# Patient Record
Sex: FEMALE | Race: WHITE | NOT HISPANIC OR LATINO | Employment: FULL TIME | URBAN - METROPOLITAN AREA
[De-identification: names, ages, dates, MRNs, and addresses within clinical notes are randomized per-mention and may not be internally consistent; named-entity substitution may affect disease eponyms.]

---

## 2017-08-03 ENCOUNTER — DOCUMENTATION ONLY (OUTPATIENT)
Dept: PRIMARY CARE CLINIC | Facility: CLINIC | Age: 52
End: 2017-08-03

## 2017-08-03 RX ORDER — CETIRIZINE HYDROCHLORIDE 10 MG/1
10 TABLET ORAL DAILY
COMMUNITY

## 2017-08-03 RX ORDER — ATORVASTATIN CALCIUM 10 MG/1
10 TABLET, FILM COATED ORAL DAILY
COMMUNITY
End: 2017-08-07 | Stop reason: ALTCHOICE

## 2017-08-03 RX ORDER — MULTIVITAMIN
TABLET ORAL
COMMUNITY

## 2017-08-03 RX ORDER — ASPIRIN 81 MG/1
81 TABLET ORAL DAILY
COMMUNITY
End: 2023-05-22

## 2017-08-07 ENCOUNTER — OFFICE VISIT (OUTPATIENT)
Dept: PRIMARY CARE CLINIC | Facility: CLINIC | Age: 52
End: 2017-08-07
Payer: OTHER GOVERNMENT

## 2017-08-07 VITALS
HEART RATE: 53 BPM | WEIGHT: 239 LBS | OXYGEN SATURATION: 96 % | TEMPERATURE: 98 F | SYSTOLIC BLOOD PRESSURE: 109 MMHG | BODY MASS INDEX: 42.35 KG/M2 | RESPIRATION RATE: 18 BRPM | HEIGHT: 63 IN | DIASTOLIC BLOOD PRESSURE: 73 MMHG

## 2017-08-07 DIAGNOSIS — E78.5 HYPERLIPIDEMIA, UNSPECIFIED HYPERLIPIDEMIA TYPE: ICD-10-CM

## 2017-08-07 DIAGNOSIS — Z23 NEED FOR PROPHYLACTIC VACCINATION AGAINST STREPTOCOCCUS PNEUMONIAE (PNEUMOCOCCUS): ICD-10-CM

## 2017-08-07 DIAGNOSIS — Z12.31 ENCOUNTER FOR SCREENING MAMMOGRAM FOR BREAST CANCER: ICD-10-CM

## 2017-08-07 DIAGNOSIS — E78.5 TYPE 2 DIABETES MELLITUS WITH HYPERLIPIDEMIA: Primary | ICD-10-CM

## 2017-08-07 DIAGNOSIS — E11.69 TYPE 2 DIABETES MELLITUS WITH HYPERLIPIDEMIA: Primary | ICD-10-CM

## 2017-08-07 DIAGNOSIS — E55.9 VITAMIN D DEFICIENCY DISEASE: ICD-10-CM

## 2017-08-07 PROCEDURE — 90732 PPSV23 VACC 2 YRS+ SUBQ/IM: CPT | Mod: S$GLB,,, | Performed by: FAMILY MEDICINE

## 2017-08-07 PROCEDURE — 90471 IMMUNIZATION ADMIN: CPT | Mod: S$GLB,,, | Performed by: FAMILY MEDICINE

## 2017-08-07 PROCEDURE — 99214 OFFICE O/P EST MOD 30 MIN: CPT | Mod: 25,S$GLB,, | Performed by: FAMILY MEDICINE

## 2017-08-07 RX ORDER — ACETAMINOPHEN 500 MG
1 TABLET ORAL DAILY
COMMUNITY
End: 2017-08-07

## 2017-08-07 RX ORDER — GLUCOSAMINE/CHONDRO SU A 500-400 MG
1 TABLET ORAL DAILY
COMMUNITY
End: 2017-08-07

## 2017-08-07 RX ORDER — FUROSEMIDE 20 MG/1
20 TABLET ORAL DAILY
COMMUNITY
End: 2017-08-07

## 2017-08-07 RX ORDER — TRAMADOL HYDROCHLORIDE 50 MG/1
50 TABLET ORAL EVERY 6 HOURS PRN
COMMUNITY
End: 2017-08-07

## 2017-08-07 RX ORDER — PRAVASTATIN SODIUM 20 MG/1
20 TABLET ORAL DAILY
COMMUNITY
End: 2017-08-07 | Stop reason: ALTCHOICE

## 2017-08-07 RX ORDER — LISINOPRIL 20 MG/1
20 TABLET ORAL DAILY
COMMUNITY
End: 2017-08-07

## 2017-08-07 RX ORDER — OMEGA-3 FATTY ACIDS 1000 MG
1 CAPSULE ORAL DAILY
COMMUNITY
End: 2017-08-07

## 2017-08-07 RX ORDER — MELOXICAM 15 MG/1
15 TABLET ORAL DAILY
COMMUNITY
End: 2017-08-07

## 2017-08-07 RX ORDER — METFORMIN HYDROCHLORIDE 500 MG/1
2 TABLET, EXTENDED RELEASE ORAL DAILY
Refills: 1 | COMMUNITY
Start: 2017-05-31 | End: 2017-08-07 | Stop reason: SDUPTHER

## 2017-08-07 RX ORDER — POTASSIUM CHLORIDE 20 MEQ/15ML
SOLUTION ORAL DAILY
COMMUNITY
End: 2017-08-07

## 2017-08-07 RX ORDER — IPRATROPIUM BROMIDE 42 UG/1
2 SPRAY, METERED NASAL 3 TIMES DAILY
COMMUNITY
End: 2017-08-07

## 2017-08-07 RX ORDER — OMEPRAZOLE 20 MG/1
40 CAPSULE, DELAYED RELEASE ORAL DAILY
COMMUNITY
End: 2017-08-07

## 2017-08-07 RX ORDER — GABAPENTIN 300 MG/1
300 CAPSULE ORAL 3 TIMES DAILY
COMMUNITY
End: 2017-08-07

## 2017-08-07 RX ORDER — LEVOTHYROXINE SODIUM 125 UG/1
125 TABLET ORAL DAILY
COMMUNITY
End: 2017-08-07

## 2017-08-07 RX ORDER — BIOTIN 800 MCG
1 TABLET ORAL DAILY
COMMUNITY
End: 2017-08-07

## 2017-08-07 RX ORDER — ATORVASTATIN CALCIUM 10 MG/1
10 TABLET, FILM COATED ORAL DAILY
COMMUNITY
End: 2017-08-07 | Stop reason: SDUPTHER

## 2017-08-07 RX ORDER — DIAZEPAM 2 MG/1
2 TABLET ORAL DAILY PRN
COMMUNITY
End: 2017-08-07

## 2017-08-07 RX ORDER — TRIAMCINOLONE ACETONIDE 1 MG/ML
1 LOTION TOPICAL 2 TIMES DAILY
COMMUNITY
End: 2017-08-07

## 2017-08-07 RX ORDER — ATORVASTATIN CALCIUM 10 MG/1
10 TABLET, FILM COATED ORAL DAILY
Qty: 90 TABLET | Refills: 3 | Status: SHIPPED | OUTPATIENT
Start: 2017-08-07 | End: 2018-01-30 | Stop reason: SDUPTHER

## 2017-08-07 RX ORDER — METFORMIN HYDROCHLORIDE 500 MG/1
1000 TABLET, EXTENDED RELEASE ORAL DAILY
Qty: 180 TABLET | Refills: 3 | Status: SHIPPED | OUTPATIENT
Start: 2017-08-07 | End: 2018-01-30 | Stop reason: SDUPTHER

## 2017-08-07 NOTE — PROGRESS NOTES
Subjective:       Patient ID: Rosina Gary is a 52 y.o. female.    Chief Complaint: Follow-up (lab results)    Hx of vitaming D deficiency, recent levels WNL. Taking OTC supplement compliantly.      Diabetes   She presents for her follow-up diabetic visit. She has type 2 diabetes mellitus. Her disease course has been improving (A1C improved from 6.8 to 6.1). There are no hypoglycemic associated symptoms. Pertinent negatives for hypoglycemia include no dizziness. There are no diabetic associated symptoms. Pertinent negatives for diabetes include no chest pain, no polydipsia and no polyuria. There are no diabetic complications. Current diabetic treatment includes diet and oral agent (monotherapy). She is compliant with treatment all of the time. An ACE inhibitor/angiotensin II receptor blocker is not being taken.   Hyperlipidemia   This is a chronic problem. The current episode started more than 1 year ago. The problem is controlled. Recent lipid tests were reviewed and are normal. Exacerbating diseases include diabetes. Pertinent negatives include no chest pain, focal weakness or shortness of breath. Current antihyperlipidemic treatment includes statins. The current treatment provides significant improvement of lipids. There are no compliance problems.  Risk factors for coronary artery disease include diabetes mellitus and dyslipidemia.     Review of Systems   Constitutional: Negative for chills and fever.   Eyes: Negative for visual disturbance.   Respiratory: Negative for shortness of breath.    Cardiovascular: Negative for chest pain.   Endocrine: Negative for polydipsia and polyuria.   Genitourinary: Negative for difficulty urinating.   Neurological: Negative for dizziness and focal weakness.       Objective:      Physical Exam   Constitutional: She is oriented to person, place, and time. She appears well-developed and well-nourished.   HENT:   Head: Normocephalic and atraumatic.   Nose: Nose normal.    Mouth/Throat: Mucous membranes are normal.   Eyes: EOM are normal. Pupils are equal, round, and reactive to light.   Neck: Neck supple. No JVD present.   Cardiovascular: Normal rate, regular rhythm, normal heart sounds and normal pulses.    Pulmonary/Chest: Effort normal and breath sounds normal.   Abdominal: Soft. Normal appearance and bowel sounds are normal. She exhibits no distension. There is no tenderness.   Neurological: She is alert and oriented to person, place, and time.   Skin: Skin is warm and dry.   Psychiatric: She has a normal mood and affect. Her speech is normal.       Assessment:       1. Type 2 diabetes mellitus with hyperlipidemia Well controlled   2. Hyperlipidemia, unspecified hyperlipidemia type Stable   3. Vitamin D deficiency disease Stable   4. Need for prophylactic vaccination against Streptococcus pneumoniae (pneumococcus)    5. Encounter for screening mammogram for breast cancer        Plan:       Type 2 diabetes mellitus with hyperlipidemia  -     Hemoglobin A1c; Future; Expected date: 02/07/2018  -     Comprehensive metabolic panel; Future; Expected date: 02/07/2018  -     metformin (GLUCOPHAGE-XR) 500 MG 24 hr tablet; Take 2 tablets (1,000 mg total) by mouth once daily. With dinner  Dispense: 180 tablet; Refill: 3    Hyperlipidemia, unspecified hyperlipidemia type  -     Comprehensive metabolic panel; Future; Expected date: 02/07/2018  -     Lipid panel; Future; Expected date: 02/07/2018  -     atorvastatin (LIPITOR) 10 MG tablet; Take 1 tablet (10 mg total) by mouth once daily.  Dispense: 90 tablet; Refill: 3    Vitamin D deficiency disease    Need for prophylactic vaccination against Streptococcus pneumoniae (pneumococcus)  -     (In Office Administered) Pneumococcal Polysaccharide Vaccine (23 Valent) (SQ/IM)    Encounter for screening mammogram for breast cancer  -     Mammo Digital Screening Bilateral With CAD; Future; Expected date: 08/07/2017

## 2017-08-14 ENCOUNTER — TELEPHONE (OUTPATIENT)
Dept: PRIMARY CARE CLINIC | Facility: CLINIC | Age: 52
End: 2017-08-14

## 2017-08-14 DIAGNOSIS — Z12.31 ENCOUNTER FOR SCREENING MAMMOGRAM FOR BREAST CANCER: Primary | ICD-10-CM

## 2017-08-14 NOTE — TELEPHONE ENCOUNTER
----- Message from Nicole Calabrese sent at 8/14/2017 11:35 AM CDT -----  Patient would like to set up her mammogram.     Please call patient back at [phone]     Thank you

## 2017-08-15 NOTE — TELEPHONE ENCOUNTER
Pt notified orders were being sent to [facility] and they should be calling her to set up an appointment. Pt states understanding

## 2017-09-01 ENCOUNTER — PATIENT MESSAGE (OUTPATIENT)
Dept: PRIMARY CARE CLINIC | Facility: CLINIC | Age: 52
End: 2017-09-01

## 2017-10-12 ENCOUNTER — OFFICE VISIT (OUTPATIENT)
Dept: PRIMARY CARE CLINIC | Facility: CLINIC | Age: 52
End: 2017-10-12
Payer: OTHER GOVERNMENT

## 2017-10-12 VITALS
OXYGEN SATURATION: 97 % | HEIGHT: 63 IN | TEMPERATURE: 98 F | HEART RATE: 46 BPM | DIASTOLIC BLOOD PRESSURE: 76 MMHG | BODY MASS INDEX: 42.88 KG/M2 | RESPIRATION RATE: 18 BRPM | SYSTOLIC BLOOD PRESSURE: 109 MMHG | WEIGHT: 242 LBS

## 2017-10-12 DIAGNOSIS — S46.012A ROTATOR CUFF STRAIN, LEFT, INITIAL ENCOUNTER: Primary | ICD-10-CM

## 2017-10-12 DIAGNOSIS — M25.551 RIGHT HIP PAIN: ICD-10-CM

## 2017-10-12 DIAGNOSIS — Z23 NEED FOR VACCINATION: ICD-10-CM

## 2017-10-12 PROCEDURE — 90686 IIV4 VACC NO PRSV 0.5 ML IM: CPT | Mod: S$GLB,,, | Performed by: FAMILY MEDICINE

## 2017-10-12 PROCEDURE — 99999 PR PBB SHADOW E&M-EST. PATIENT-LVL IV: CPT | Mod: PBBFAC,,, | Performed by: FAMILY MEDICINE

## 2017-10-12 PROCEDURE — 99214 OFFICE O/P EST MOD 30 MIN: CPT | Mod: 25,S$GLB,, | Performed by: FAMILY MEDICINE

## 2017-10-12 PROCEDURE — 90471 IMMUNIZATION ADMIN: CPT | Mod: S$GLB,,, | Performed by: FAMILY MEDICINE

## 2017-10-12 RX ORDER — ETODOLAC 400 MG/1
400 TABLET, FILM COATED ORAL 2 TIMES DAILY PRN
Qty: 60 TABLET | Refills: 1 | Status: SHIPPED | OUTPATIENT
Start: 2017-10-12 | End: 2018-01-30

## 2017-10-12 NOTE — PROGRESS NOTES
"Subjective:       Patient ID: Rosina Gary is a 52 y.o. female.    Chief Complaint: Hip Pain (pt says it makes a popping sound and hurts ) and Shoulder Pain (pts left shoulder has been hurting for about a month )    Left upper arm and shoulder pain for ~30 days. No known injury, but hurts to lie on affected side. Hurts to raise arm above head.  Also has been having right hip pain and 'popping' for the past 2-3 months, mainly with prolonged ambulation. No pain at rest, and no known injury. Hasn't taken anything for it or done anything for it.      Review of Systems   Constitutional: Negative for fever.   Respiratory: Negative for shortness of breath.    Cardiovascular: Negative for chest pain.   Musculoskeletal: Positive for arthralgias. Negative for joint swelling, myalgias, neck pain and neck stiffness.   Skin: Negative for rash.   Neurological: Negative for weakness.       Objective:      Vitals:    10/12/17 0805   BP: 109/76   BP Location: Left arm   Patient Position: Sitting   BP Method: Large (Automatic)   Pulse: (!) 46   Resp: 18   Temp: 98.4 °F (36.9 °C)   TempSrc: Oral   SpO2: 97%   Weight: 109.8 kg (242 lb)   Height: 5' 3" (1.6 m)     Physical Exam   Constitutional: She is oriented to person, place, and time. She appears well-developed and well-nourished.   HENT:   Head: Normocephalic and atraumatic.   Cardiovascular: Normal rate, regular rhythm and normal heart sounds.    Pulmonary/Chest: Effort normal and breath sounds normal.   Musculoskeletal: She exhibits no edema.        Left shoulder: She exhibits decreased range of motion. She exhibits no tenderness, no bony tenderness, no effusion, no crepitus and no deformity.        Right hip: She exhibits normal range of motion, normal strength, no bony tenderness, no crepitus and no deformity.   Left shoulder pain with forward flexion and abduction, decreased internal rotation; right hip pain with internal rotation   Neurological: She is alert and oriented " to person, place, and time.   Skin: Skin is warm and dry.       Assessment:       1. Rotator cuff strain, left, initial encounter    2. Right hip pain    3. Need for vaccination        Plan:       Rotator cuff strain, left, initial encounter  Comments:  Apply ice TID. May need ortho referral +/- MRI if no response to conservative tx  Orders:  -     X-Ray Shoulder 2 or More Views Left; Future; Expected date: 10/12/2017  -     Ambulatory Referral to Physical/Occupational Therapy  -     etodolac (LODINE) 400 MG tablet; Take 1 tablet (400 mg total) by mouth 2 (two) times daily as needed.  Dispense: 60 tablet; Refill: 1    Right hip pain  Comments:  probable OA  Orders:  -     X-Ray Hip 2 View Right; Future; Expected date: 10/12/2017  -     Ambulatory Referral to Physical/Occupational Therapy    Need for vaccination  -     Influenza - Quadrivalent (3 years & older) (PF)      Medication List with Changes/Refills   New Medications    ETODOLAC (LODINE) 400 MG TABLET    Take 1 tablet (400 mg total) by mouth 2 (two) times daily as needed.   Current Medications    ALBUTEROL (ACCUNEB) 1.25 MG/3 ML NEBU    Inhale 1.25 mg into the lungs every 4 (four) hours as needed. 1 Solution for Nebulization Inhalation Every 4-6 hours    ASPIRIN (ECOTRIN) 81 MG EC TABLET    Take 81 mg by mouth once daily.    ATORVASTATIN (LIPITOR) 10 MG TABLET    Take 1 tablet (10 mg total) by mouth once daily.    CALCIUM-VITAMIN D (CALCIUM 600 + D,3,) 600 MG(1,500MG) -400 UNIT TAB    Take by mouth.    CETIRIZINE (ZYRTEC) 10 MG TABLET    Take 10 mg by mouth once daily.    FLUTICASONE (FLONASE) 50 MCG/ACTUATION NASAL SPRAY    2 sprays by Each Nare route daily as needed. 2 Spray, Suspension Nasal Every day    METFORMIN (GLUCOPHAGE-XR) 500 MG 24 HR TABLET    Take 2 tablets (1,000 mg total) by mouth once daily. With dinner

## 2017-10-23 ENCOUNTER — TELEPHONE (OUTPATIENT)
Dept: PRIMARY CARE CLINIC | Facility: CLINIC | Age: 52
End: 2017-10-23

## 2017-10-23 NOTE — TELEPHONE ENCOUNTER
Patient notified, but still wants to do Pt.  She states no one has called her.  [phone]

## 2018-01-15 ENCOUNTER — CLINICAL SUPPORT (OUTPATIENT)
Dept: PRIMARY CARE CLINIC | Facility: CLINIC | Age: 53
End: 2018-01-15
Payer: OTHER GOVERNMENT

## 2018-01-15 DIAGNOSIS — E78.5 HYPERLIPIDEMIA, UNSPECIFIED HYPERLIPIDEMIA TYPE: ICD-10-CM

## 2018-01-15 DIAGNOSIS — E11.69 TYPE 2 DIABETES MELLITUS WITH HYPERLIPIDEMIA: ICD-10-CM

## 2018-01-15 DIAGNOSIS — E78.5 TYPE 2 DIABETES MELLITUS WITH HYPERLIPIDEMIA: ICD-10-CM

## 2018-01-15 PROCEDURE — 83036 HEMOGLOBIN GLYCOSYLATED A1C: CPT

## 2018-01-15 PROCEDURE — 80053 COMPREHEN METABOLIC PANEL: CPT

## 2018-01-15 PROCEDURE — 80061 LIPID PANEL: CPT

## 2018-01-15 PROCEDURE — 99999 PR PBB SHADOW E&M-EST. PATIENT-LVL II: CPT | Mod: PBBFAC,,,

## 2018-01-16 LAB
ALBUMIN SERPL BCP-MCNC: 3.3 G/DL
ALP SERPL-CCNC: 113 U/L
ALT SERPL W/O P-5'-P-CCNC: 18 U/L
ANION GAP SERPL CALC-SCNC: 10 MMOL/L
AST SERPL-CCNC: 20 U/L
BILIRUB SERPL-MCNC: 0.5 MG/DL
BUN SERPL-MCNC: 14 MG/DL
CALCIUM SERPL-MCNC: 9.1 MG/DL
CHLORIDE SERPL-SCNC: 108 MMOL/L
CHOLEST SERPL-MCNC: 117 MG/DL
CHOLEST/HDLC SERPL: 2.3 {RATIO}
CO2 SERPL-SCNC: 26 MMOL/L
CREAT SERPL-MCNC: 0.7 MG/DL
EST. GFR  (AFRICAN AMERICAN): >60 ML/MIN/1.73 M^2
EST. GFR  (NON AFRICAN AMERICAN): >60 ML/MIN/1.73 M^2
ESTIMATED AVG GLUCOSE: 123 MG/DL
GLUCOSE SERPL-MCNC: 99 MG/DL
HBA1C MFR BLD HPLC: 5.9 %
HDLC SERPL-MCNC: 51 MG/DL
HDLC SERPL: 43.6 %
LDLC SERPL CALC-MCNC: 48.6 MG/DL
NONHDLC SERPL-MCNC: 66 MG/DL
POTASSIUM SERPL-SCNC: 4.2 MMOL/L
PROT SERPL-MCNC: 7.1 G/DL
SODIUM SERPL-SCNC: 144 MMOL/L
TRIGL SERPL-MCNC: 87 MG/DL

## 2018-01-30 ENCOUNTER — OFFICE VISIT (OUTPATIENT)
Dept: PRIMARY CARE CLINIC | Facility: CLINIC | Age: 53
End: 2018-01-30
Payer: OTHER GOVERNMENT

## 2018-01-30 VITALS
TEMPERATURE: 98 F | DIASTOLIC BLOOD PRESSURE: 83 MMHG | RESPIRATION RATE: 18 BRPM | SYSTOLIC BLOOD PRESSURE: 141 MMHG | BODY MASS INDEX: 42.17 KG/M2 | HEART RATE: 53 BPM | HEIGHT: 63 IN | WEIGHT: 238 LBS | OXYGEN SATURATION: 99 %

## 2018-01-30 DIAGNOSIS — E78.5 TYPE 2 DIABETES MELLITUS WITH HYPERLIPIDEMIA: Primary | ICD-10-CM

## 2018-01-30 DIAGNOSIS — E78.5 HYPERLIPIDEMIA, UNSPECIFIED HYPERLIPIDEMIA TYPE: ICD-10-CM

## 2018-01-30 DIAGNOSIS — J45.909 CHRONIC ASTHMA, UNSPECIFIED ASTHMA SEVERITY, UNSPECIFIED WHETHER COMPLICATED, UNSPECIFIED WHETHER PERSISTENT: Chronic | ICD-10-CM

## 2018-01-30 DIAGNOSIS — E11.69 TYPE 2 DIABETES MELLITUS WITH HYPERLIPIDEMIA: Primary | ICD-10-CM

## 2018-01-30 PROCEDURE — 99214 OFFICE O/P EST MOD 30 MIN: CPT | Mod: S$GLB,,, | Performed by: FAMILY MEDICINE

## 2018-01-30 PROCEDURE — 3008F BODY MASS INDEX DOCD: CPT | Mod: S$GLB,,, | Performed by: FAMILY MEDICINE

## 2018-01-30 PROCEDURE — 99999 PR PBB SHADOW E&M-EST. PATIENT-LVL III: CPT | Mod: PBBFAC,,, | Performed by: FAMILY MEDICINE

## 2018-01-30 RX ORDER — ALBUTEROL SULFATE 0.83 MG/ML
2.5 SOLUTION RESPIRATORY (INHALATION) EVERY 4 HOURS PRN
Qty: 1 BOX | Refills: 5 | Status: SHIPPED | OUTPATIENT
Start: 2018-01-30 | End: 2020-02-03 | Stop reason: SDUPTHER

## 2018-01-30 RX ORDER — ATORVASTATIN CALCIUM 10 MG/1
10 TABLET, FILM COATED ORAL DAILY
Qty: 90 TABLET | Refills: 3 | Status: SHIPPED | OUTPATIENT
Start: 2018-01-30 | End: 2019-01-19 | Stop reason: SDUPTHER

## 2018-01-30 RX ORDER — METFORMIN HYDROCHLORIDE 500 MG/1
1000 TABLET, EXTENDED RELEASE ORAL DAILY
Qty: 180 TABLET | Refills: 3 | Status: SHIPPED | OUTPATIENT
Start: 2018-01-30 | End: 2019-01-13 | Stop reason: SDUPTHER

## 2018-01-30 RX ORDER — ALBUTEROL SULFATE 1.25 MG/3ML
1.25 SOLUTION RESPIRATORY (INHALATION) EVERY 4 HOURS PRN
Qty: 1 BOX | Refills: 2 | Status: CANCELLED | OUTPATIENT
Start: 2018-01-30

## 2018-01-30 RX ORDER — ALBUTEROL SULFATE 90 UG/1
2 AEROSOL, METERED RESPIRATORY (INHALATION) EVERY 6 HOURS PRN
COMMUNITY
End: 2018-01-30 | Stop reason: SDUPTHER

## 2018-01-30 RX ORDER — ALBUTEROL SULFATE 90 UG/1
2 AEROSOL, METERED RESPIRATORY (INHALATION) EVERY 4 HOURS PRN
Qty: 18 G | Refills: 5 | Status: SHIPPED | OUTPATIENT
Start: 2018-01-30 | End: 2021-09-08 | Stop reason: SDUPTHER

## 2018-01-30 NOTE — PROGRESS NOTES
"Subjective:       Patient ID: Rosina Gary is a 53 y.o. female.    Chief Complaint: Results (pt is here to discuss ) and Medication Refill    Recent labs reviewed. All look good  Asthma - stable, only has symptoms in winter months, needs refills  DM - excellent glycemic control, compliant with meds and diet  HLD - lipid profile looks great, no SE from meds      Review of Systems   Constitutional: Negative for chills, fatigue, fever and weight loss.   HENT: Negative for congestion.    Eyes: Negative for blurred vision and visual disturbance.   Respiratory: Negative for cough and shortness of breath.    Cardiovascular: Negative for chest pain.   Gastrointestinal: Negative for abdominal pain, nausea and vomiting.   Endocrine: Negative for polydipsia, polyphagia and polyuria.   Genitourinary: Negative for difficulty urinating and impotence.   Musculoskeletal: Negative for arthralgias.   Skin: Negative for pallor and rash.   Neurological: Negative for dizziness, tremors, seizures, speech difficulty, weakness and headaches.   Psychiatric/Behavioral: Negative for confusion and sleep disturbance. The patient is not nervous/anxious.        Objective:      Vitals:    01/30/18 1413   BP: (!) 141/83   BP Location: Left arm   Patient Position: Sitting   BP Method: Large (Automatic)   Pulse: (!) 53   Resp: 18   Temp: 97.7 °F (36.5 °C)   TempSrc: Oral   SpO2: 99%   Weight: 108 kg (238 lb)   Height: 5' 3" (1.6 m)     Lab Results   Component Value Date    WBC 6.39 08/17/2012    HGB 14.5 08/17/2012    HCT 46.1 08/17/2012     08/17/2012    CHOL 117 (L) 01/15/2018    TRIG 87 01/15/2018    HDL 51 01/15/2018    ALT 18 01/15/2018    AST 20 01/15/2018     01/15/2018    K 4.2 01/15/2018     01/15/2018    CREATININE 0.7 01/15/2018    BUN 14 01/15/2018    CO2 26 01/15/2018    TSH 2.042 08/17/2012    HGBA1C 5.9 (H) 01/15/2018     Physical Exam   Constitutional: She is oriented to person, place, and time. She appears " well-developed and well-nourished.   HENT:   Head: Normocephalic and atraumatic.   Eyes: EOM are normal. Pupils are equal, round, and reactive to light.   Neck: Neck supple. No JVD present. Carotid bruit is not present.   Cardiovascular: Normal rate, regular rhythm and normal heart sounds.    Pulses:       Radial pulses are 2+ on the right side, and 2+ on the left side.   Pulmonary/Chest: Effort normal and breath sounds normal.   Abdominal: Soft. Bowel sounds are normal. She exhibits no distension. There is no tenderness.   Musculoskeletal: She exhibits no edema.   Neurological: She is alert and oriented to person, place, and time.   Skin: Skin is warm and dry.   Psychiatric: She has a normal mood and affect. Her behavior is normal.   Nursing note and vitals reviewed.      Assessment:       1. Type 2 diabetes mellitus with hyperlipidemia Well controlled   2. Hyperlipidemia, unspecified hyperlipidemia type Stable   3. Chronic asthma, unspecified asthma severity, unspecified whether complicated, unspecified whether persistent Stable       Plan:       Type 2 diabetes mellitus with hyperlipidemia  -     metFORMIN (GLUCOPHAGE-XR) 500 MG 24 hr tablet; Take 2 tablets (1,000 mg total) by mouth once daily. With dinner  Dispense: 180 tablet; Refill: 3  -     Comprehensive metabolic panel; Future; Expected date: 07/30/2018  -     Hemoglobin A1c; Future; Expected date: 07/30/2018  -     Microalbumin/creatinine urine ratio; Future; Expected date: 07/30/2018    Hyperlipidemia, unspecified hyperlipidemia type  -     atorvastatin (LIPITOR) 10 MG tablet; Take 1 tablet (10 mg total) by mouth once daily.  Dispense: 90 tablet; Refill: 3  -     Comprehensive metabolic panel; Future; Expected date: 07/30/2018  -     Lipid panel; Future; Expected date: 07/30/2018    Chronic asthma, unspecified asthma severity, unspecified whether complicated, unspecified whether persistent  -     albuterol (PROAIR HFA) 90 mcg/actuation inhaler; Inhale 2  puffs into the lungs every 4 (four) hours as needed for Wheezing or Shortness of Breath. Rescue  Dispense: 18 g; Refill: 5  -     albuterol (PROVENTIL) 2.5 mg /3 mL (0.083 %) nebulizer solution; Take 3 mLs (2.5 mg total) by nebulization every 4 (four) hours as needed for Wheezing or Shortness of Breath. Rescue  Dispense: 1 Box; Refill: 5  -     CBC auto differential; Future; Expected date: 07/30/2018    Other orders  -     Cancel: albuterol (ACCUNEB) 1.25 mg/3 mL Nebu; Take 3 mLs (1.25 mg total) by nebulization every 4 (four) hours as needed. 1 Solution for Nebulization Inhalation Every 4-6 hours  Dispense: 1 Box; Refill: 2      Medication List with Changes/Refills   New Medications    ALBUTEROL (PROVENTIL) 2.5 MG /3 ML (0.083 %) NEBULIZER SOLUTION    Take 3 mLs (2.5 mg total) by nebulization every 4 (four) hours as needed for Wheezing or Shortness of Breath. Rescue   Current Medications    ASPIRIN (ECOTRIN) 81 MG EC TABLET    Take 81 mg by mouth once daily.    CALCIUM-VITAMIN D (CALCIUM 600 + D,3,) 600 MG(1,500MG) -400 UNIT TAB    Take by mouth.    CETIRIZINE (ZYRTEC) 10 MG TABLET    Take 10 mg by mouth once daily.    FLUTICASONE (FLONASE) 50 MCG/ACTUATION NASAL SPRAY    2 sprays by Each Nare route daily as needed. 2 Spray, Suspension Nasal Every day   Changed and/or Refilled Medications    Modified Medication Previous Medication    ALBUTEROL (PROAIR HFA) 90 MCG/ACTUATION INHALER albuterol (PROAIR HFA) 90 mcg/actuation inhaler       Inhale 2 puffs into the lungs every 4 (four) hours as needed for Wheezing or Shortness of Breath. Rescue    Inhale 2 puffs into the lungs every 6 (six) hours as needed for Wheezing. Rescue    ATORVASTATIN (LIPITOR) 10 MG TABLET atorvastatin (LIPITOR) 10 MG tablet       Take 1 tablet (10 mg total) by mouth once daily.    Take 1 tablet (10 mg total) by mouth once daily.    METFORMIN (GLUCOPHAGE-XR) 500 MG 24 HR TABLET metformin (GLUCOPHAGE-XR) 500 MG 24 hr tablet       Take 2 tablets  (1,000 mg total) by mouth once daily. With dinner    Take 2 tablets (1,000 mg total) by mouth once daily. With dinner   Discontinued Medications    ALBUTEROL (ACCUNEB) 1.25 MG/3 ML NEBU    Inhale 1.25 mg into the lungs every 4 (four) hours as needed. 1 Solution for Nebulization Inhalation Every 4-6 hours    ETODOLAC (LODINE) 400 MG TABLET    Take 1 tablet (400 mg total) by mouth 2 (two) times daily as needed.

## 2018-07-30 ENCOUNTER — CLINICAL SUPPORT (OUTPATIENT)
Dept: PRIMARY CARE CLINIC | Facility: CLINIC | Age: 53
End: 2018-07-30
Payer: OTHER GOVERNMENT

## 2018-07-30 DIAGNOSIS — E78.5 HYPERLIPIDEMIA, UNSPECIFIED HYPERLIPIDEMIA TYPE: ICD-10-CM

## 2018-07-30 DIAGNOSIS — E11.69 TYPE 2 DIABETES MELLITUS WITH HYPERLIPIDEMIA: ICD-10-CM

## 2018-07-30 DIAGNOSIS — J45.909 CHRONIC ASTHMA, UNSPECIFIED ASTHMA SEVERITY, UNSPECIFIED WHETHER COMPLICATED, UNSPECIFIED WHETHER PERSISTENT: Chronic | ICD-10-CM

## 2018-07-30 DIAGNOSIS — E78.5 TYPE 2 DIABETES MELLITUS WITH HYPERLIPIDEMIA: ICD-10-CM

## 2018-07-30 LAB
ALBUMIN SERPL BCP-MCNC: 3.8 G/DL
ALBUMIN/CREAT UR: 10.3 UG/MG
ALP SERPL-CCNC: 79 U/L
ALT SERPL W/O P-5'-P-CCNC: 22 U/L
ANION GAP SERPL CALC-SCNC: 8 MMOL/L
AST SERPL-CCNC: 28 U/L
BASOPHILS # BLD AUTO: 0.1 K/UL
BASOPHILS NFR BLD: 0.9 %
BILIRUB SERPL-MCNC: 0.6 MG/DL
BUN SERPL-MCNC: 18 MG/DL
CALCIUM SERPL-MCNC: 9.1 MG/DL
CHLORIDE SERPL-SCNC: 109 MMOL/L
CHOLEST SERPL-MCNC: 122 MG/DL
CHOLEST/HDLC SERPL: 2.1 {RATIO}
CO2 SERPL-SCNC: 25 MMOL/L
CREAT SERPL-MCNC: 0.8 MG/DL
CREAT UR-MCNC: 174 MG/DL
DIFFERENTIAL METHOD: ABNORMAL
EOSINOPHIL # BLD AUTO: 0.2 K/UL
EOSINOPHIL NFR BLD: 2.1 %
ERYTHROCYTE [DISTWIDTH] IN BLOOD BY AUTOMATED COUNT: 14.4 %
EST. GFR  (AFRICAN AMERICAN): >60 ML/MIN/1.73 M^2
EST. GFR  (NON AFRICAN AMERICAN): >60 ML/MIN/1.73 M^2
ESTIMATED AVG GLUCOSE: 131 MG/DL
GLUCOSE SERPL-MCNC: 98 MG/DL
HBA1C MFR BLD HPLC: 6.2 %
HCT VFR BLD AUTO: 41.4 %
HDLC SERPL-MCNC: 57 MG/DL
HDLC SERPL: 46.7 %
HGB BLD-MCNC: 13.5 G/DL
LDLC SERPL CALC-MCNC: 45 MG/DL
LYMPHOCYTES # BLD AUTO: 2 K/UL
LYMPHOCYTES NFR BLD: 27.7 %
MCH RBC QN AUTO: 28.9 PG
MCHC RBC AUTO-ENTMCNC: 32.6 G/DL
MCV RBC AUTO: 89 FL
MICROALBUMIN UR DL<=1MG/L-MCNC: 18 UG/ML
MONOCYTES # BLD AUTO: 0.6 K/UL
MONOCYTES NFR BLD: 7.9 %
NEUTROPHILS # BLD AUTO: 4.4 K/UL
NEUTROPHILS NFR BLD: 61.4 %
NONHDLC SERPL-MCNC: 65 MG/DL
PLATELET # BLD AUTO: 259 K/UL
PMV BLD AUTO: 8.6 FL
POTASSIUM SERPL-SCNC: 3.9 MMOL/L
PROT SERPL-MCNC: 7.1 G/DL
RBC # BLD AUTO: 4.67 M/UL
SODIUM SERPL-SCNC: 142 MMOL/L
TRIGL SERPL-MCNC: 99 MG/DL
WBC # BLD AUTO: 7.1 K/UL

## 2018-07-30 PROCEDURE — 99999 PR PBB SHADOW E&M-EST. PATIENT-LVL I: CPT | Mod: PBBFAC,,,

## 2018-07-30 PROCEDURE — 82043 UR ALBUMIN QUANTITATIVE: CPT

## 2018-07-30 PROCEDURE — 83036 HEMOGLOBIN GLYCOSYLATED A1C: CPT

## 2018-08-06 ENCOUNTER — OFFICE VISIT (OUTPATIENT)
Dept: PRIMARY CARE CLINIC | Facility: CLINIC | Age: 53
End: 2018-08-06
Payer: OTHER GOVERNMENT

## 2018-08-06 VITALS
RESPIRATION RATE: 18 BRPM | HEART RATE: 59 BPM | OXYGEN SATURATION: 96 % | BODY MASS INDEX: 41.82 KG/M2 | WEIGHT: 236 LBS | DIASTOLIC BLOOD PRESSURE: 86 MMHG | HEIGHT: 63 IN | SYSTOLIC BLOOD PRESSURE: 136 MMHG | TEMPERATURE: 98 F

## 2018-08-06 DIAGNOSIS — E55.9 VITAMIN D DEFICIENCY DISEASE: ICD-10-CM

## 2018-08-06 DIAGNOSIS — E66.01 MORBID OBESITY WITH BMI OF 40.0-44.9, ADULT: ICD-10-CM

## 2018-08-06 DIAGNOSIS — Z12.4 CERVICAL CANCER SCREENING: ICD-10-CM

## 2018-08-06 DIAGNOSIS — E11.69 TYPE 2 DIABETES MELLITUS WITH HYPERLIPIDEMIA: Primary | ICD-10-CM

## 2018-08-06 DIAGNOSIS — Z13.5 DIABETIC RETINOPATHY SCREENING: ICD-10-CM

## 2018-08-06 DIAGNOSIS — E78.5 TYPE 2 DIABETES MELLITUS WITH HYPERLIPIDEMIA: Primary | ICD-10-CM

## 2018-08-06 DIAGNOSIS — J45.20 MILD INTERMITTENT CHRONIC ASTHMA WITHOUT COMPLICATION: Chronic | ICD-10-CM

## 2018-08-06 DIAGNOSIS — E78.5 HYPERLIPIDEMIA, UNSPECIFIED HYPERLIPIDEMIA TYPE: ICD-10-CM

## 2018-08-06 DIAGNOSIS — Z12.31 ENCOUNTER FOR SCREENING MAMMOGRAM FOR BREAST CANCER: ICD-10-CM

## 2018-08-06 PROCEDURE — 99214 OFFICE O/P EST MOD 30 MIN: CPT | Mod: S$GLB,,, | Performed by: FAMILY MEDICINE

## 2018-08-06 PROCEDURE — 99999 PR PBB SHADOW E&M-EST. PATIENT-LVL IV: CPT | Mod: PBBFAC,,, | Performed by: FAMILY MEDICINE

## 2018-08-06 NOTE — PROGRESS NOTES
Subjective:       Patient ID: Rosina Gary is a 53 y.o. female.    Chief Complaint: Diabetes (patient is here to discuss lab results )    Diabetes   She presents for her follow-up diabetic visit. She has type 2 diabetes mellitus. Her disease course has been stable. There are no hypoglycemic associated symptoms. Pertinent negatives for hypoglycemia include no confusion. There are no diabetic associated symptoms. Pertinent negatives for diabetes include no blurred vision, no chest pain, no foot ulcerations, no polydipsia, no polyuria and no visual change. There are no hypoglycemic complications. Symptoms are stable. There are no diabetic complications. Risk factors for coronary artery disease include diabetes mellitus, dyslipidemia and obesity. Current diabetic treatment includes oral agent (monotherapy). She is compliant with treatment all of the time. Her weight is stable. She is following a diabetic diet. An ACE inhibitor/angiotensin II receptor blocker is not being taken. Eye exam is current (saw Dr. Schoenberger 3 months ago).   Hyperlipidemia   This is a chronic problem. The current episode started more than 1 year ago. The problem is controlled. Recent lipid tests were reviewed and are normal. Exacerbating diseases include diabetes. There are no known factors aggravating her hyperlipidemia. Pertinent negatives include no chest pain or shortness of breath. Current antihyperlipidemic treatment includes statins. The current treatment provides significant improvement of lipids. There are no compliance problems.    Asthma   There is no chest tightness, hemoptysis, shortness of breath, sputum production or wheezing. This is a chronic problem. The current episode started more than 1 year ago. Asthma causes daytime symptoms never. Asthma causes nighttime symptoms never. The problem has been unchanged. Pertinent negatives include no chest pain, fever, heartburn, orthopnea, PND or trouble swallowing. Her symptoms are  "aggravated by change in weather (typically only has symptoms in winter). Her symptoms are alleviated by beta-agonist. She reports significant improvement on treatment. Her past medical history is significant for asthma.     Review of Systems   Constitutional: Negative for fever.   HENT: Negative for trouble swallowing.    Eyes: Negative for blurred vision and visual disturbance.   Respiratory: Negative for hemoptysis, sputum production, shortness of breath and wheezing.    Cardiovascular: Negative for chest pain and PND.   Gastrointestinal: Negative for heartburn, nausea and vomiting.   Endocrine: Negative for polydipsia and polyuria.   Musculoskeletal: Negative for joint swelling.   Skin: Negative for rash.   Psychiatric/Behavioral: Negative for agitation and confusion.       Objective:      Vitals:    08/06/18 0808   BP: 136/86   BP Location: Left arm   Patient Position: Sitting   BP Method: Large (Automatic)   Pulse: (!) 59   Resp: 18   Temp: 98.4 °F (36.9 °C)   TempSrc: Oral   SpO2: 96%   Weight: 107 kg (236 lb)   Height: 5' 3" (1.6 m)     Lab Results   Component Value Date    WBC 7.10 07/30/2018    HGB 13.5 07/30/2018    HCT 41.4 07/30/2018     07/30/2018    CHOL 122 07/30/2018    TRIG 99 07/30/2018    HDL 57 07/30/2018    ALT 22 07/30/2018    AST 28 07/30/2018     07/30/2018    K 3.9 07/30/2018     07/30/2018    CREATININE 0.8 07/30/2018    BUN 18 07/30/2018    CO2 25 07/30/2018    TSH 2.042 08/17/2012    HGBA1C 6.2 (H) 07/30/2018     Physical Exam   Constitutional: She is oriented to person, place, and time. She appears well-developed and well-nourished.   HENT:   Head: Normocephalic and atraumatic.   Eyes: EOM are normal.   Neck: Neck supple. No JVD present.   Cardiovascular: Normal rate, regular rhythm and normal heart sounds.    Pulses:       Dorsalis pedis pulses are 2+ on the right side, and 2+ on the left side.   Pulmonary/Chest: Effort normal and breath sounds normal. "   Musculoskeletal: She exhibits no edema.   Feet:   Right Foot:   Protective Sensation: 5 sites tested. 5 sites sensed.   Skin Integrity: Negative for ulcer, blister or skin breakdown.   Left Foot:   Skin Integrity: Negative for ulcer, blister or skin breakdown.   Neurological: She is alert and oriented to person, place, and time.   Skin: Skin is warm and dry.   Psychiatric: She has a normal mood and affect. Her behavior is normal.   Nursing note and vitals reviewed.      Assessment:       1. Type 2 diabetes mellitus with hyperlipidemia    2. Hyperlipidemia, unspecified hyperlipidemia type    3. Mild intermittent chronic asthma without complication    4. Morbid obesity with BMI of 40.0-44.9, adult    5. Vitamin D deficiency disease    6. Diabetic retinopathy screening    7. Encounter for screening mammogram for breast cancer    8. Cervical cancer screening        Plan:       Type 2 diabetes mellitus with hyperlipidemia  Comments:  Well controlled, continue current plan of care  Orders:  -      DIABETES FOOT EXAM  -     Hemoglobin A1c; Future; Expected date: 02/06/2019  -     TSH; Future; Expected date: 02/06/2019    Hyperlipidemia, unspecified hyperlipidemia type  Comments:  Continue current meds  Orders:  -     Comprehensive metabolic panel; Future; Expected date: 02/06/2019  -     Lipid panel; Future; Expected date: 02/06/2019    Mild intermittent chronic asthma without complication  Comments:  Controlled, continue albuterol as needed  Orders:  -     CBC auto differential; Future; Expected date: 02/06/2019    Morbid obesity with BMI of 40.0-44.9, adult  Comments:  Low carb diet, exercise    Vitamin D deficiency disease  -     Vitamin D; Future; Expected date: 02/06/2019    Diabetic retinopathy screening  Comments:  advised to continue to see opthalmologist for annual screening    Encounter for screening mammogram for breast cancer  -     Mammo Digital Screening Bilat without CA; Future; Expected date:  08/20/2018    Cervical cancer screening  -     Ambulatory referral to Obstetrics / Gynecology      Medication List with Changes/Refills   Current Medications    ALBUTEROL (PROAIR HFA) 90 MCG/ACTUATION INHALER    Inhale 2 puffs into the lungs every 4 (four) hours as needed for Wheezing or Shortness of Breath. Rescue    ALBUTEROL (PROVENTIL) 2.5 MG /3 ML (0.083 %) NEBULIZER SOLUTION    Take 3 mLs (2.5 mg total) by nebulization every 4 (four) hours as needed for Wheezing or Shortness of Breath. Rescue    ASPIRIN (ECOTRIN) 81 MG EC TABLET    Take 81 mg by mouth once daily.    ATORVASTATIN (LIPITOR) 10 MG TABLET    Take 1 tablet (10 mg total) by mouth once daily.    CALCIUM-VITAMIN D (CALCIUM 600 + D,3,) 600 MG(1,500MG) -400 UNIT TAB    Take by mouth.    CETIRIZINE (ZYRTEC) 10 MG TABLET    Take 10 mg by mouth once daily.    FLUTICASONE (FLONASE) 50 MCG/ACTUATION NASAL SPRAY    2 sprays by Each Nare route daily as needed. 2 Spray, Suspension Nasal Every day    METFORMIN (GLUCOPHAGE-XR) 500 MG 24 HR TABLET    Take 2 tablets (1,000 mg total) by mouth once daily. With dinner

## 2018-08-13 ENCOUNTER — OFFICE VISIT (OUTPATIENT)
Dept: OBSTETRICS AND GYNECOLOGY | Facility: CLINIC | Age: 53
End: 2018-08-13
Payer: OTHER GOVERNMENT

## 2018-08-13 VITALS
WEIGHT: 236.31 LBS | SYSTOLIC BLOOD PRESSURE: 120 MMHG | DIASTOLIC BLOOD PRESSURE: 70 MMHG | BODY MASS INDEX: 41.87 KG/M2 | HEIGHT: 63 IN

## 2018-08-13 DIAGNOSIS — N95.2 ATROPHIC VAGINITIS: ICD-10-CM

## 2018-08-13 DIAGNOSIS — N94.10 DYSPAREUNIA, FEMALE: ICD-10-CM

## 2018-08-13 DIAGNOSIS — Z01.419 ENCOUNTER FOR GYNECOLOGICAL EXAMINATION WITHOUT ABNORMAL FINDING: Primary | ICD-10-CM

## 2018-08-13 PROCEDURE — 88175 CYTOPATH C/V AUTO FLUID REDO: CPT

## 2018-08-13 PROCEDURE — 99999 PR PBB SHADOW E&M-EST. PATIENT-LVL III: CPT | Mod: PBBFAC,,, | Performed by: OBSTETRICS & GYNECOLOGY

## 2018-08-13 PROCEDURE — 99386 PREV VISIT NEW AGE 40-64: CPT | Mod: S$GLB,,, | Performed by: OBSTETRICS & GYNECOLOGY

## 2018-08-13 NOTE — PROGRESS NOTES
PT HERE FOR ANNUAL.  HAS VAGINAL DRYNESS (NO MENSES IN 2 YRS) AND PAIN ON ENTRY WITH INTERCOURSE.    ROS:  GENERAL: No fever, chills, fatigability or weight loss.  VULVAR: No pain, no lesions and no itching.  VAGINAL: No relaxation, no itching, no discharge, no abnormal bleeding and no lesions.  ABDOMEN: No abdominal pain. Denies nausea. Denies vomiting. No diarrhea. No constipation  BREAST: Denies pain. No lumps. No discharge.  URINARY: No incontinence, no nocturia, no frequency and no dysuria.  CARDIOVASCULAR: No chest pain. No shortness of breath. No leg cramps.  NEUROLOGICAL: No headaches. No vision changes.  The remainder of the review of systems was negative.    PE:  General Appearance: overweight And Well developed. Well nourished. In no acute distress.  Vulva: Lesions: No.  Urethral Meatus: Normal size. Normal location. No lesions. No prolapse.  Urethra: No masses. No tenderness. No prolapse. No scarring.  Bladder: No masses. No tenderness.  Vagina: Mucosa NI:yes discharge no, atrophy yes, cystocele no or rectocele no.  Cervix: Lesion: no  Stenotic: no Cervical motion tenderness: no  Uterus: Uterus size: 6 weeks. Support good. Uterus size: Normal  Adnexa: Masses: No Tenderness: No CDS Nodularity: No  Abdomen: obese No masses. No tenderness.  Breasts: No bilateral masses. No bilateral discharge. No bilateral tenderness. No bilateral fibrocystic changes.  Neck: No thyroid enlargement. No thyroid masses.  Skin: Rashes: No      PROCEDURES:    PLAN:     DIAGNOSIS:  1. Encounter for gynecological examination without abnormal finding    2. Atrophic vaginitis    3. Dyspareunia, female        MEDICATIONS & ORDERS:  Orders Placed This Encounter    Liquid-based pap smear, screening    ospemifene (OSPHENA) 60 mg Tab       Patient was counseled today on the new ACS guidelines for cervical cytology screening as well as the current recommendations for breast cancer screening. She was counseled to follow up with her PCP  for other routine health maintenance. Counseling session lasted approximately 10 minutes, and all her questions were answered.     Patient was counseled today on A.C.S. Pap guidelines and recommendations for yearly pelvic exams, mammograms and monthly self breast exams; to see her PCP for other health maintenance and the increased risks of CVD, MI, VTE, CVA , and Invasive Breast Cancer on Prempro and the increased risk of CVA with Premarin as reported by the [employer].H.I[employer] studies; the benefits of HRT/ERT; her personal risks which include; alternative therapies for vasomotor symptoms (not FDA approved) including soy, black cohosh, Vit E and avoidance of triggers such as cigarette smoking, alcohol, humidity, stress and caffeine; alternative Rxs for treatment of menopause symptoms such as antidepressants or Clonidine. Counseling session lasted approximately minutes, and all her questions were completely answered.      FOLLOW-UP: With me in 12 month

## 2018-08-13 NOTE — LETTER
August 13, 2018      Thomas Gill MD  [address]  [address]  [place] [address]           [facility]  [address], [facility] [address]  [place] [address]  Phone: [phone]  Fax: [phone]          Patient: Rosina Gary   MR Number: [id number]   YOB: 1965   Date of Visit: 8/13/2018       Dear Dr. Thomas Gill:    Thank you for referring Rosina Gary to me for evaluation. Attached you will find relevant portions of my assessment and plan of care.    If you have questions, please do not hesitate to call me. I look forward to following Rosina Gary along with you.    Sincerely,    Leandro Villegas Jr., MD    Enclosure  CC:  No Recipients    If you would like to receive this communication electronically, please contact [email or web address] or [phone] to request more information on [facility] access.    For providers and/or their staff who would like to refer a patient to [facility], please contact us through our one-stop-shop provider referral line, [facility], at [phone].    If you feel you have received this communication in error or would no longer like to receive these types of communications, please e-mail [email or web address]

## 2018-10-22 ENCOUNTER — OFFICE VISIT (OUTPATIENT)
Dept: PRIMARY CARE CLINIC | Facility: CLINIC | Age: 53
End: 2018-10-22
Payer: OTHER GOVERNMENT

## 2018-10-22 VITALS
RESPIRATION RATE: 18 BRPM | HEART RATE: 54 BPM | WEIGHT: 239 LBS | SYSTOLIC BLOOD PRESSURE: 114 MMHG | BODY MASS INDEX: 42.35 KG/M2 | OXYGEN SATURATION: 99 % | HEIGHT: 63 IN | TEMPERATURE: 98 F | DIASTOLIC BLOOD PRESSURE: 81 MMHG

## 2018-10-22 DIAGNOSIS — Z23 NEED FOR VACCINATION: ICD-10-CM

## 2018-10-22 DIAGNOSIS — M75.102 LEFT ROTATOR CUFF TEAR ARTHROPATHY: Primary | ICD-10-CM

## 2018-10-22 DIAGNOSIS — M12.812 LEFT ROTATOR CUFF TEAR ARTHROPATHY: Primary | ICD-10-CM

## 2018-10-22 PROCEDURE — 99999 PR PBB SHADOW E&M-EST. PATIENT-LVL IV: CPT | Mod: PBBFAC,,, | Performed by: FAMILY MEDICINE

## 2018-10-22 PROCEDURE — 90472 IMMUNIZATION ADMIN EACH ADD: CPT | Mod: S$GLB,,, | Performed by: FAMILY MEDICINE

## 2018-10-22 PROCEDURE — 99214 OFFICE O/P EST MOD 30 MIN: CPT | Mod: 25,S$GLB,, | Performed by: FAMILY MEDICINE

## 2018-10-22 PROCEDURE — 90686 IIV4 VACC NO PRSV 0.5 ML IM: CPT | Mod: S$GLB,,, | Performed by: FAMILY MEDICINE

## 2018-10-22 PROCEDURE — 90471 IMMUNIZATION ADMIN: CPT | Mod: S$GLB,,, | Performed by: FAMILY MEDICINE

## 2018-10-22 PROCEDURE — 90714 TD VACC NO PRESV 7 YRS+ IM: CPT | Mod: S$GLB,,, | Performed by: FAMILY MEDICINE

## 2018-10-22 NOTE — PROGRESS NOTES
Subjective:       Patient ID: Rosina Gary is a 53 y.o. female.    Chief Complaint: Shoulder Pain (left shoulder)    Shoulder Pain    The pain is present in the left shoulder. This is a chronic problem. The current episode started more than 1 year ago. There has been no history of extremity trauma. The problem occurs intermittently. The problem has been gradually worsening. The quality of the pain is described as aching and sharp. The pain is severe. Associated symptoms include a limited range of motion and stiffness. Pertinent negatives include no fever, headaches, inability to bear weight, itching, joint locking, joint swelling, numbness or tingling. The symptoms are aggravated by activity (worst with overhead movements). She has tried NSAIDS, cold, heat and acetaminophen for the symptoms. The treatment provided no relief. Her past medical history is significant for diabetes. There is no history of Injuries to Extremity.   X-rays done October 2017 normal  Review of Systems   Constitutional: Negative for activity change, fever and unexpected weight change.   HENT: Negative for hearing loss, rhinorrhea and trouble swallowing.    Eyes: Negative for discharge and visual disturbance.   Respiratory: Negative for chest tightness and wheezing.    Cardiovascular: Negative for chest pain and palpitations.   Gastrointestinal: Negative for blood in stool, constipation, diarrhea and vomiting.   Endocrine: Negative for polydipsia and polyuria.   Genitourinary: Negative for difficulty urinating, dysuria, hematuria and menstrual problem.   Musculoskeletal: Positive for arthralgias and stiffness. Negative for joint swelling and neck pain.   Skin: Negative for itching.   Neurological: Negative for tingling, weakness, numbness and headaches.   Psychiatric/Behavioral: Negative for confusion and dysphoric mood.       Objective:      Vitals:    10/22/18 1326   BP: 114/81   BP Location: Right arm   Patient Position: Sitting   BP  "Method: Large (Automatic)   Pulse: (!) 54   Resp: 18   Temp: 98.2 °F (36.8 °C)   TempSrc: Oral   SpO2: 99%   Weight: 108.4 kg (239 lb)   Height: 5' 3" (1.6 m)     Physical Exam   Constitutional: She is oriented to person, place, and time. She appears well-developed and well-nourished.   HENT:   Head: Normocephalic and atraumatic.   Neck: Neck supple. No JVD present.   Cardiovascular: Normal rate, regular rhythm and normal heart sounds.   Pulses:       Radial pulses are 2+ on the right side, and 2+ on the left side.   Pulmonary/Chest: Effort normal and breath sounds normal.   Musculoskeletal: She exhibits no edema.        Left shoulder: She exhibits decreased range of motion (markedly limited abduction and internal rotation). She exhibits no bony tenderness, no swelling, no effusion, no crepitus and no deformity.   Neurological: She is alert and oriented to person, place, and time. She has normal strength.   Skin: Skin is warm and dry.   Nursing note and vitals reviewed.      Assessment:       1. Left rotator cuff tear arthropathy    2. Need for vaccination        Plan:       Left rotator cuff tear arthropathy  Comments:  Given chronicity of symptoms and lack of response to conservative treatment, needs MRI for further assessment  Orders:  -     MRI Shoulder Without Contrast Left; Future; Expected date: 10/22/2018    Need for vaccination  -     Influenza - Quadrivalent (3 years & older) (PF)  -     Td Vaccine (Adult) - Preservative Free         Medication List           Accurate as of 10/22/18  1:44 PM. If you have any questions, ask your nurse or doctor.               CONTINUE taking these medications    * albuterol 90 mcg/actuation inhaler  Commonly known as:  PROAIR HFA  Inhale 2 puffs into the lungs every 4 (four) hours as needed for Wheezing or Shortness of Breath. Rescue     * albuterol 2.5 mg /3 mL (0.083 %) nebulizer solution  Commonly known as:  PROVENTIL  Take 3 mLs (2.5 mg total) by nebulization every 4 " (four) hours as needed for Wheezing or Shortness of Breath. Rescue     aspirin 81 MG EC tablet  Commonly known as:  ECOTRIN     atorvastatin 10 MG tablet  Commonly known as:  LIPITOR  Take 1 tablet (10 mg total) by mouth once daily.     CALCIUM 600 + D(3) 600 mg(1,500mg) -400 unit Tab  Generic drug:  calcium-vitamin D     cetirizine 10 MG tablet  Commonly known as:  ZYRTEC     fluticasone 50 mcg/actuation nasal spray  Commonly known as:  FLONASE     metFORMIN 500 MG 24 hr tablet  Commonly known as:  GLUCOPHAGE-XR  Take 2 tablets (1,000 mg total) by mouth once daily. With dinner     ospemifene 60 mg Tab  Commonly known as:  OSPHENA  Take 60 mg by mouth once daily.         * This list has 2 medication(s) that are the same as other medications prescribed for you. Read the directions carefully, and ask your doctor or other care provider to review them with you.

## 2018-10-22 NOTE — PROGRESS NOTES
Patient verified by name and . Flu vaccine given im to right deltoid and Td vaccine given im to left delotid using aseptic technique.

## 2018-11-01 ENCOUNTER — TELEPHONE (OUTPATIENT)
Dept: PRIMARY CARE CLINIC | Facility: CLINIC | Age: 53
End: 2018-11-01

## 2018-11-01 NOTE — TELEPHONE ENCOUNTER
----- Message from Yasemin Garcia sent at 11/1/2018  2:05 PM CDT -----  Contact: self  Patient [phone] is calling to check the status of rescheduling her MRI that she could not attend as insurance had not approved/please advise

## 2018-11-02 ENCOUNTER — TELEPHONE (OUTPATIENT)
Dept: PRIMARY CARE CLINIC | Facility: CLINIC | Age: 53
End: 2018-11-02

## 2018-11-05 ENCOUNTER — TELEPHONE (OUTPATIENT)
Dept: PRIMARY CARE CLINIC | Facility: CLINIC | Age: 53
End: 2018-11-05

## 2018-11-05 NOTE — TELEPHONE ENCOUNTER
----- Message from Chai Almeida sent at 11/5/2018  8:39 AM CST -----  Type: Needs Medical Advice    Who Called:  Patient  Best Call Back Number: [phone] (home)   Additional Information: Patient needs to talk to office in regards to MRI - please call to advise.

## 2018-11-05 NOTE — TELEPHONE ENCOUNTER
Patient says she spoke to her insurance company and they stated the MRI was denied because there was not enough information that the patient has been treated for this same issue. The patient says she was here last year for the same thing and the did PT. I reached out to Christy with the auth dept to make sure that information was noted, she says it needs a peer to peer. The number and case number are attached.   [phone] option #4 case # [id number]

## 2018-11-08 ENCOUNTER — TELEPHONE (OUTPATIENT)
Dept: PRIMARY CARE CLINIC | Facility: CLINIC | Age: 53
End: 2018-11-08

## 2018-11-08 NOTE — TELEPHONE ENCOUNTER
----- Message from Ingrid Daniel sent at 11/8/2018  8:09 AM CST -----  Contact: Self  Type: Needs Medical Advice    Who Called:  Patient       Best Call Back Number: [phone] (home)     Additional Information: Patient needs to follow up with Lázaro to see about her MRI getting canceled

## 2018-11-15 NOTE — TELEPHONE ENCOUNTER
Patient notified that we have tried to get a hold of the insurance company. She says she will call the number that was sent to her in the mail. She will give them the number listed. I apologized that it was taking so long but we were trying

## 2018-11-15 NOTE — TELEPHONE ENCOUNTER
Tried to get in touch with someone regarding Peer to Peer. I was transferred to three different places and sat on hold for more than 10 minutes. Did not get to speak to someone regarding Peer to Peer. If patient can get a hold of someone have them call [phone].

## 2018-11-26 ENCOUNTER — TELEPHONE (OUTPATIENT)
Dept: PRIMARY CARE CLINIC | Facility: CLINIC | Age: 53
End: 2018-11-26

## 2018-11-26 NOTE — TELEPHONE ENCOUNTER
----- Message from Yasemin Calabrese sent at 11/26/2018  1:46 PM CST -----  Contact: Rosina Arellano: Needs Medical Advice    Who Called:  patient  Best Call Back Number: [phone]  Additional Information: Following up on approval for MRI. Thanks!

## 2018-11-27 NOTE — TELEPHONE ENCOUNTER
Please let the patient know that after much discussion with the insurance company they are continuing to refuse her MRI until a full 6 week course of conservative treatment is completed including physical therapy, anti-inflammatories. She should follow up after 6 weeks and we can re order.

## 2018-11-27 NOTE — TELEPHONE ENCOUNTER
I spoke to the insurance company, after being on hold for 23 minutes. They are stating this needs an appeal with more clinical information and not a peer to peer. The appeal does not need to be scheduled. The number for that is [phone]. Hit option 4 and then option 2. I notified the patient that Bonnie's schedule has been blocked again today to do this and I would call her before I leave this evening and let her know what the insurance company says. She also says that after the last time we talked, she gave the insurance company the back line number and they were supposed to call us. I notified her that I have not gotten any notification that they called us back.     In the meantime, she says made an appointment to see Dr. Gill on Friday to get an injection in hope to get some relief because she is going out of town on 12/05/18

## 2018-11-30 ENCOUNTER — OFFICE VISIT (OUTPATIENT)
Dept: PRIMARY CARE CLINIC | Facility: CLINIC | Age: 53
End: 2018-11-30
Payer: OTHER GOVERNMENT

## 2018-11-30 VITALS
WEIGHT: 235 LBS | RESPIRATION RATE: 16 BRPM | DIASTOLIC BLOOD PRESSURE: 70 MMHG | SYSTOLIC BLOOD PRESSURE: 115 MMHG | BODY MASS INDEX: 41.63 KG/M2 | HEART RATE: 61 BPM | OXYGEN SATURATION: 96 % | TEMPERATURE: 98 F

## 2018-11-30 DIAGNOSIS — M12.812 LEFT ROTATOR CUFF TEAR ARTHROPATHY: Primary | ICD-10-CM

## 2018-11-30 DIAGNOSIS — M75.102 LEFT ROTATOR CUFF TEAR ARTHROPATHY: Primary | ICD-10-CM

## 2018-11-30 PROCEDURE — 20610 DRAIN/INJ JOINT/BURSA W/O US: CPT | Mod: LT,S$GLB,, | Performed by: FAMILY MEDICINE

## 2018-11-30 PROCEDURE — 99999 PR PBB SHADOW E&M-EST. PATIENT-LVL III: CPT | Mod: PBBFAC,,, | Performed by: FAMILY MEDICINE

## 2018-11-30 PROCEDURE — 99213 OFFICE O/P EST LOW 20 MIN: CPT | Mod: 25,S$GLB,, | Performed by: FAMILY MEDICINE

## 2018-11-30 RX ORDER — MELOXICAM 15 MG/1
15 TABLET ORAL DAILY
Qty: 30 TABLET | Refills: 2 | Status: SHIPPED | OUTPATIENT
Start: 2018-11-30 | End: 2019-01-11

## 2018-11-30 RX ORDER — METHYLPREDNISOLONE ACETATE 40 MG/ML
40 INJECTION, SUSPENSION INTRA-ARTICULAR; INTRALESIONAL; INTRAMUSCULAR; SOFT TISSUE
Status: DISCONTINUED | OUTPATIENT
Start: 2018-11-30 | End: 2018-11-30 | Stop reason: HOSPADM

## 2018-11-30 RX ADMIN — METHYLPREDNISOLONE ACETATE 40 MG: 40 INJECTION, SUSPENSION INTRA-ARTICULAR; INTRALESIONAL; INTRAMUSCULAR; SOFT TISSUE at 08:11

## 2018-11-30 NOTE — PROCEDURES
Large Joint Aspiration/Injection: L glenohumeral  Date/Time: 11/30/2018 8:32 AM  Performed by: Thomas Gill MD  Authorized by: Thomas Gill MD     Consent Done?:  Yes (Verbal)  Indications:  Pain  Procedure site marked: Yes    Timeout: Prior to procedure the correct patient, procedure, and site was verified      Location:  Shoulder  Site:  L glenohumeral  Prep: Patient was prepped and draped in usual sterile fashion    Ultrasonic Guidance for needle placement: No  Needle size:  22 G  Approach:  Posterior  Medications:  40 mg methylPREDNISolone acetate 40 mg/mL  Patient tolerance:  Patient tolerated the procedure well with no immediate complications

## 2018-11-30 NOTE — PROGRESS NOTES
Subjective:       Patient ID: Rosina Gary is a 53 y.o. female.    Chief Complaint: Shoulder Pain (Left shoulder pain )    Continues to struggle with left shoulder pain for over a year.  Findings suspicious for rotator cuff arthropathy.  MRI was ordered, but denied by her insurance company.  Has not done physical therapy, willing to try.  Patient is leaving for a 2 week vacation on Monday, but she will start when she gets back.  Requesting cortisone injection to help get her through until then.  No recent illness or injury.      Review of Systems   Musculoskeletal: Positive for arthralgias.   Neurological: Negative for weakness.   Hematological: Does not bruise/bleed easily.       Objective:      Vitals:    11/30/18 0805   BP: 115/70   BP Location: Left arm   Patient Position: Sitting   BP Method: Large (Automatic)   Pulse: 61   Resp: 16   Temp: 98 °F (36.7 °C)   TempSrc: Oral   SpO2: 96%   Weight: 106.6 kg (235 lb)     Physical Exam   Constitutional: She is oriented to person, place, and time. She appears well-developed and well-nourished.   Musculoskeletal:        Left shoulder: She exhibits decreased range of motion. She exhibits no effusion, no crepitus and no deformity.   Neurological: She is alert and oriented to person, place, and time.   Skin: Skin is warm and dry.   Psychiatric: She has a normal mood and affect. Her behavior is normal.   Vitals reviewed.      Assessment:       1. Left rotator cuff tear arthropathy        Plan:       Left rotator cuff tear arthropathy  -     Ambulatory Referral to Physical/Occupational Therapy  -     meloxicam (MOBIC) 15 MG tablet; Take 1 tablet (15 mg total) by mouth once daily.  Dispense: 30 tablet; Refill: 2  -     Large Joint Aspiration/Injection: L glenohumeral  -     methylPREDNISolone acetate injection 40 mg         Medication List           Accurate as of 11/30/18  8:33 AM. If you have any questions, ask your nurse or doctor.               START taking these  medications    meloxicam 15 MG tablet  Commonly known as:  MOBIC  Take 1 tablet (15 mg total) by mouth once daily.  Started by:  Thomas Gill MD        CONTINUE taking these medications    * albuterol 90 mcg/actuation inhaler  Commonly known as:  PROAIR HFA  Inhale 2 puffs into the lungs every 4 (four) hours as needed for Wheezing or Shortness of Breath. Rescue     * albuterol 2.5 mg /3 mL (0.083 %) nebulizer solution  Commonly known as:  PROVENTIL  Take 3 mLs (2.5 mg total) by nebulization every 4 (four) hours as needed for Wheezing or Shortness of Breath. Rescue     aspirin 81 MG EC tablet  Commonly known as:  ECOTRIN     atorvastatin 10 MG tablet  Commonly known as:  LIPITOR  Take 1 tablet (10 mg total) by mouth once daily.     CALCIUM 600 + D(3) 600 mg(1,500mg) -400 unit Tab  Generic drug:  calcium-vitamin D     cetirizine 10 MG tablet  Commonly known as:  ZYRTEC     fluticasone 50 mcg/actuation nasal spray  Commonly known as:  FLONASE     metFORMIN 500 MG 24 hr tablet  Commonly known as:  GLUCOPHAGE-XR  Take 2 tablets (1,000 mg total) by mouth once daily. With dinner     ospemifene 60 mg Tab  Commonly known as:  OSPHENA  Take 60 mg by mouth once daily.         * This list has 2 medication(s) that are the same as other medications prescribed for you. Read the directions carefully, and ask your doctor or other care provider to review them with you.               Where to Get Your Medications      These medications were sent to [facility] [id number]  [facility] - [address] AT [facility] [address]  [address], [place] [address]    Phone:  [phone]   · meloxicam 15 MG tablet

## 2018-12-24 PROBLEM — M75.102 LEFT ROTATOR CUFF TEAR ARTHROPATHY: Status: ACTIVE | Noted: 2018-12-24

## 2018-12-24 PROBLEM — M12.812 LEFT ROTATOR CUFF TEAR ARTHROPATHY: Status: ACTIVE | Noted: 2018-12-24

## 2019-01-11 ENCOUNTER — OFFICE VISIT (OUTPATIENT)
Dept: PRIMARY CARE CLINIC | Facility: CLINIC | Age: 54
End: 2019-01-11
Payer: OTHER GOVERNMENT

## 2019-01-11 VITALS
DIASTOLIC BLOOD PRESSURE: 77 MMHG | OXYGEN SATURATION: 99 % | WEIGHT: 234 LBS | RESPIRATION RATE: 18 BRPM | SYSTOLIC BLOOD PRESSURE: 115 MMHG | HEIGHT: 63 IN | BODY MASS INDEX: 41.46 KG/M2 | HEART RATE: 51 BPM

## 2019-01-11 DIAGNOSIS — M75.102 LEFT ROTATOR CUFF TEAR ARTHROPATHY: Primary | ICD-10-CM

## 2019-01-11 DIAGNOSIS — M12.812 LEFT ROTATOR CUFF TEAR ARTHROPATHY: Primary | ICD-10-CM

## 2019-01-11 PROCEDURE — 99213 PR OFFICE/OUTPT VISIT, EST, LEVL III, 20-29 MIN: ICD-10-PCS | Mod: S$GLB,,, | Performed by: FAMILY MEDICINE

## 2019-01-11 PROCEDURE — 99999 PR PBB SHADOW E&M-EST. PATIENT-LVL III: CPT | Mod: PBBFAC,,, | Performed by: FAMILY MEDICINE

## 2019-01-11 PROCEDURE — 99999 PR PBB SHADOW E&M-EST. PATIENT-LVL III: ICD-10-PCS | Mod: PBBFAC,,, | Performed by: FAMILY MEDICINE

## 2019-01-11 PROCEDURE — 99213 OFFICE O/P EST LOW 20 MIN: CPT | Mod: S$GLB,,, | Performed by: FAMILY MEDICINE

## 2019-01-11 NOTE — PROGRESS NOTES
"Subjective:       Patient ID: Rosina Gary is a 53 y.o. female.    Chief Complaint: Shoulder Pain (Patient has completed PT and is still have major should pain )    Left shoulder pain for over a year now, findings worrisome for possible rotator cuff injury.  Gout at intra-articular steroid injection in late November, got 1-2 weeks of mild, partial relief.  Has been taking meloxicam consistently, says not helpful.  Has done 6 sessions of physical therapy over the past several weeks (all that her insurance company would approve), but has not found that to be helpful at all.  If anything, she says her shoulder pain is actually worse, and her range of motion has worsened.  Has pain and difficulty with all ADLs that involve the use of her upper extremities.      Review of Systems   Constitutional: Negative for fever.   Respiratory: Negative for shortness of breath.    Cardiovascular: Negative for chest pain.   Musculoskeletal: Positive for arthralgias.   Neurological: Negative for weakness and numbness.       Objective:      Vitals:    01/11/19 0935   BP: 115/77   BP Location: Left arm   Patient Position: Sitting   BP Method: Large (Automatic)   Pulse: (!) 51   Resp: 18   SpO2: 99%   Weight: 106.1 kg (234 lb)   Height: 5' 3" (1.6 m)     Physical Exam   Constitutional: She is oriented to person, place, and time. She appears well-developed and well-nourished.   HENT:   Head: Normocephalic and atraumatic.   Cardiovascular: Normal rate, regular rhythm and normal heart sounds.   Pulmonary/Chest: Effort normal and breath sounds normal.   Musculoskeletal: She exhibits no edema.        Left shoulder: She exhibits decreased range of motion (markedly decreased internal rotation and abduction) and tenderness. She exhibits no swelling, no effusion, no crepitus, no deformity, normal pulse and normal strength.   Neurological: She is alert and oriented to person, place, and time.   Skin: Skin is warm and dry.   Nursing note and " vitals reviewed.      Assessment:       1. Left rotator cuff tear arthropathy        Plan:       Left rotator cuff tear arthropathy  -     MRI Shoulder Without Contrast Left; Future; Expected date: 01/11/2019  Poor/suboptimal response to conservative treatment.  Needs MRI shoulder to better define underlying pathology and assist with further disposition and treatment planning.       Medication List           Accurate as of 1/11/19  9:52 AM. If you have any questions, ask your nurse or doctor.               CONTINUE taking these medications    * albuterol 90 mcg/actuation inhaler  Commonly known as:  PROAIR HFA  Inhale 2 puffs into the lungs every 4 (four) hours as needed for Wheezing or Shortness of Breath. Rescue     * albuterol 2.5 mg /3 mL (0.083 %) nebulizer solution  Commonly known as:  PROVENTIL  Take 3 mLs (2.5 mg total) by nebulization every 4 (four) hours as needed for Wheezing or Shortness of Breath. Rescue     aspirin 81 MG EC tablet  Commonly known as:  ECOTRIN     atorvastatin 10 MG tablet  Commonly known as:  LIPITOR  Take 1 tablet (10 mg total) by mouth once daily.     CALCIUM 600 + D(3) 600 mg(1,500mg) -400 unit Tab  Generic drug:  calcium-vitamin D     cetirizine 10 MG tablet  Commonly known as:  ZYRTEC     fluticasone 50 mcg/actuation nasal spray  Commonly known as:  FLONASE     metFORMIN 500 MG 24 hr tablet  Commonly known as:  GLUCOPHAGE-XR  Take 2 tablets (1,000 mg total) by mouth once daily. With dinner     ospemifene 60 mg Tab  Commonly known as:  OSPHENA  Take 60 mg by mouth once daily.         * This list has 2 medication(s) that are the same as other medications prescribed for you. Read the directions carefully, and ask your doctor or other care provider to review them with you.            STOP taking these medications    meloxicam 15 MG tablet  Commonly known as:  MOBIC  Stopped by:  Thomas Gill MD

## 2019-01-13 DIAGNOSIS — E11.69 TYPE 2 DIABETES MELLITUS WITH HYPERLIPIDEMIA: ICD-10-CM

## 2019-01-13 DIAGNOSIS — E78.5 TYPE 2 DIABETES MELLITUS WITH HYPERLIPIDEMIA: ICD-10-CM

## 2019-01-14 RX ORDER — METFORMIN HYDROCHLORIDE 500 MG/1
TABLET, EXTENDED RELEASE ORAL
Qty: 180 TABLET | Refills: 1 | Status: SHIPPED | OUTPATIENT
Start: 2019-01-14 | End: 2019-09-10 | Stop reason: SDUPTHER

## 2019-01-16 ENCOUNTER — TELEPHONE (OUTPATIENT)
Dept: PRIMARY CARE CLINIC | Facility: CLINIC | Age: 54
End: 2019-01-16

## 2019-01-16 NOTE — TELEPHONE ENCOUNTER
----- Message from Luz Elena Nesbitt sent at 1/16/2019 10:24 AM CST -----  Contact: Paige/[facility]  Paige would like for pt's MRI orders sent to ([facility] [place])   Fax # [phone]  Please call to advise   Pt Call Back   Thanks

## 2019-01-16 NOTE — TELEPHONE ENCOUNTER
I spoke to Rosina, she says she does not WANT to do it at [facility] but they told her that was where the insurance prefers it to be done at. She is calling the hospital to make sure it will be covered there but asked that I send the order to [facility] with [facility] just in case. I told her it might need to be authorized again being that it is external but she says when she talked to [facility], she made it seem like it was ok and did not need to be done again. I told the patient to let me know if there is anything I need to do for her, she states understanding.

## 2019-01-17 ENCOUNTER — TELEPHONE (OUTPATIENT)
Dept: PRIMARY CARE CLINIC | Facility: CLINIC | Age: 54
End: 2019-01-17

## 2019-01-17 NOTE — TELEPHONE ENCOUNTER
----- Message from Chai Almeida sent at 1/17/2019 10:15 AM CST -----  Type: Needs Medical Advice    Who Called: Laurence GIBSON  Best Call Back Number: [phone]  Additional Information: Patient was scheduled for MRI - need order faxed to: [phone]

## 2019-01-19 DIAGNOSIS — E78.5 HYPERLIPIDEMIA, UNSPECIFIED HYPERLIPIDEMIA TYPE: ICD-10-CM

## 2019-01-21 ENCOUNTER — TELEPHONE (OUTPATIENT)
Dept: PRIMARY CARE CLINIC | Facility: CLINIC | Age: 54
End: 2019-01-21

## 2019-01-21 RX ORDER — ATORVASTATIN CALCIUM 10 MG/1
TABLET, FILM COATED ORAL
Qty: 90 TABLET | Refills: 1 | Status: SHIPPED | OUTPATIENT
Start: 2019-01-21 | End: 2019-09-10 | Stop reason: SDUPTHER

## 2019-01-21 NOTE — TELEPHONE ENCOUNTER
[facility] has received the order but they want him to physically sign it. Order signed and faxed back

## 2019-01-21 NOTE — TELEPHONE ENCOUNTER
----- Message from Mely Charles sent at 1/21/2019 11:20 AM CST -----  Type: Needs Medical Advice    Who Called:  [facility]  Best Call Back Number: [phone]  Additional Information: Patient is scheduled for an open MRI on 1/23/19 but they will be canceling this unless office faxes the order to [phone]. Please advise when completed.

## 2019-01-22 DIAGNOSIS — M75.92 SUPRASPINATUS TENDINITIS, LEFT: Primary | ICD-10-CM

## 2019-01-24 ENCOUNTER — TELEPHONE (OUTPATIENT)
Dept: PRIMARY CARE CLINIC | Facility: CLINIC | Age: 54
End: 2019-01-24

## 2019-01-24 NOTE — TELEPHONE ENCOUNTER
----- Message from Rosa M Haile sent at 1/24/2019 10:08 AM CST -----  Contact: Patient  Type:  Patient Returning Call    Who Called:  Rosina, patient  Who Left Message for Patient:  Lázaro  Does the patient know what this is regarding?:  Results  Best Call Back Number:  [phone]  Additional Information:  Missed your call, please call her back. Thanks.

## 2019-02-06 ENCOUNTER — CLINICAL SUPPORT (OUTPATIENT)
Dept: PRIMARY CARE CLINIC | Facility: CLINIC | Age: 54
End: 2019-02-06
Payer: OTHER GOVERNMENT

## 2019-02-06 DIAGNOSIS — E78.5 TYPE 2 DIABETES MELLITUS WITH HYPERLIPIDEMIA: ICD-10-CM

## 2019-02-06 DIAGNOSIS — E11.69 TYPE 2 DIABETES MELLITUS WITH HYPERLIPIDEMIA: ICD-10-CM

## 2019-02-06 DIAGNOSIS — E55.9 VITAMIN D DEFICIENCY DISEASE: ICD-10-CM

## 2019-02-06 DIAGNOSIS — E78.5 HYPERLIPIDEMIA, UNSPECIFIED HYPERLIPIDEMIA TYPE: ICD-10-CM

## 2019-02-06 DIAGNOSIS — J45.20 MILD INTERMITTENT CHRONIC ASTHMA WITHOUT COMPLICATION: Chronic | ICD-10-CM

## 2019-02-06 PROCEDURE — 99499 NO LOS: ICD-10-PCS | Mod: S$GLB,,, | Performed by: FAMILY MEDICINE

## 2019-02-06 PROCEDURE — 99999 PR PBB SHADOW E&M-EST. PATIENT-LVL II: CPT | Mod: PBBFAC,,,

## 2019-02-06 PROCEDURE — 99499 UNLISTED E&M SERVICE: CPT | Mod: S$GLB,,, | Performed by: FAMILY MEDICINE

## 2019-02-06 PROCEDURE — 99999 PR PBB SHADOW E&M-EST. PATIENT-LVL II: ICD-10-PCS | Mod: PBBFAC,,,

## 2019-02-13 ENCOUNTER — OFFICE VISIT (OUTPATIENT)
Dept: PRIMARY CARE CLINIC | Facility: CLINIC | Age: 54
End: 2019-02-13
Payer: OTHER GOVERNMENT

## 2019-02-13 VITALS
BODY MASS INDEX: 41.81 KG/M2 | TEMPERATURE: 99 F | OXYGEN SATURATION: 100 % | DIASTOLIC BLOOD PRESSURE: 75 MMHG | WEIGHT: 236 LBS | HEART RATE: 58 BPM | SYSTOLIC BLOOD PRESSURE: 112 MMHG | RESPIRATION RATE: 18 BRPM

## 2019-02-13 DIAGNOSIS — E66.01 MORBID OBESITY WITH BMI OF 40.0-44.9, ADULT: ICD-10-CM

## 2019-02-13 DIAGNOSIS — E11.69 TYPE 2 DIABETES MELLITUS WITH HYPERLIPIDEMIA: Primary | ICD-10-CM

## 2019-02-13 DIAGNOSIS — E78.5 HYPERLIPIDEMIA, UNSPECIFIED HYPERLIPIDEMIA TYPE: ICD-10-CM

## 2019-02-13 DIAGNOSIS — E55.9 VITAMIN D DEFICIENCY DISEASE: ICD-10-CM

## 2019-02-13 DIAGNOSIS — E78.5 TYPE 2 DIABETES MELLITUS WITH HYPERLIPIDEMIA: Primary | ICD-10-CM

## 2019-02-13 PROCEDURE — 99214 PR OFFICE/OUTPT VISIT, EST, LEVL IV, 30-39 MIN: ICD-10-PCS | Mod: S$GLB,,, | Performed by: FAMILY MEDICINE

## 2019-02-13 PROCEDURE — 99999 PR PBB SHADOW E&M-EST. PATIENT-LVL III: CPT | Mod: PBBFAC,,, | Performed by: FAMILY MEDICINE

## 2019-02-13 PROCEDURE — 99999 PR PBB SHADOW E&M-EST. PATIENT-LVL III: ICD-10-PCS | Mod: PBBFAC,,, | Performed by: FAMILY MEDICINE

## 2019-02-13 PROCEDURE — 99214 OFFICE O/P EST MOD 30 MIN: CPT | Mod: S$GLB,,, | Performed by: FAMILY MEDICINE

## 2019-02-13 RX ORDER — ERGOCALCIFEROL 1.25 MG/1
50000 CAPSULE ORAL
Qty: 12 CAPSULE | Refills: 1 | Status: SHIPPED | OUTPATIENT
Start: 2019-02-13 | End: 2019-09-09

## 2019-02-13 NOTE — PROGRESS NOTES
Subjective:       Patient ID: Rosina Gary is a 54 y.o. female.    Chief Complaint: Diabetes (Patient is here to review lab results ) and Hyperlipidemia    Diabetes-A1c up slightly from 6.2-6.7%, but has been under more stress, has been dealing with ongoing left shoulder issues.  Compliant with medications, committed to getting her diabetes under tighter control  Hyperlipidemia-lipid profile looks great, compliant with medications, no adverse side effects  Vitamin-D deficiency-level slowly improving, but at the very low end of normal range.  Currently on low-dose daily supplementation.      Review of Systems   Constitutional: Negative for activity change and unexpected weight change.   HENT: Negative for hearing loss, rhinorrhea and trouble swallowing.    Eyes: Negative for discharge and visual disturbance.   Respiratory: Negative for chest tightness and wheezing.    Cardiovascular: Negative for chest pain and palpitations.   Gastrointestinal: Negative for blood in stool, constipation, diarrhea and vomiting.   Endocrine: Negative for polydipsia and polyuria.   Genitourinary: Negative for difficulty urinating, dysuria, hematuria and menstrual problem.   Musculoskeletal: Positive for arthralgias and joint swelling. Negative for neck pain.   Allergic/Immunologic: Negative for immunocompromised state.   Neurological: Negative for weakness and headaches.   Psychiatric/Behavioral: Negative for confusion and dysphoric mood.       Objective:      Vitals:    02/13/19 0801   BP: 112/75   BP Location: Left arm   Patient Position: Sitting   BP Method: Large (Automatic)   Pulse: (!) 58   Resp: 18   Temp: 98.5 °F (36.9 °C)   TempSrc: Oral   SpO2: 100%   Weight: 107 kg (236 lb)     Lab Results   Component Value Date    WBC 8.20 02/06/2019    HGB 13.2 02/06/2019    HCT 41.1 02/06/2019     02/06/2019    CHOL 133 02/06/2019    TRIG 76 02/06/2019    HDL 64 02/06/2019    ALT 17 02/06/2019    AST 22 02/06/2019      02/06/2019    K 4.2 02/06/2019     02/06/2019    CREATININE 0.8 02/06/2019    BUN 18 02/06/2019    CO2 25 02/06/2019    TSH 1.75 02/06/2019    HGBA1C 6.7 (H) 02/06/2019     Physical Exam   Constitutional: She is oriented to person, place, and time. She appears well-developed and well-nourished.   HENT:   Head: Normocephalic and atraumatic.   Cardiovascular: Normal rate, regular rhythm and normal heart sounds.   Pulmonary/Chest: Effort normal and breath sounds normal.   Musculoskeletal: She exhibits no edema.   Neurological: She is alert and oriented to person, place, and time.   Skin: Skin is warm and dry.   Nursing note and vitals reviewed.      Assessment:       1. Type 2 diabetes mellitus with hyperlipidemia    2. Vitamin D deficiency disease    3. Hyperlipidemia, unspecified hyperlipidemia type    4. Morbid obesity with BMI of 40.0-44.9, adult        Plan:       Type 2 diabetes mellitus with hyperlipidemia  -     Comprehensive metabolic panel; Future; Expected date: 08/13/2019  -     Hemoglobin A1c; Future; Expected date: 08/13/2019  -     Microalbumin/creatinine urine ratio; Future; Expected date: 08/13/2019  Decent control, continue current meds, stressed importance of reducing carb intake  Vitamin D deficiency disease  -     PTH, intact; Future; Expected date: 08/13/2019  -     Vitamin D; Future; Expected date: 08/13/2019  -     ergocalciferol (ERGOCALCIFEROL) 50,000 unit Cap; Take 1 capsule (50,000 Units total) by mouth every 7 days.  Dispense: 12 capsule; Refill: 1  Add high dose weekly supplementation for a few months  Hyperlipidemia, unspecified hyperlipidemia type  -     Comprehensive metabolic panel; Future; Expected date: 08/13/2019  -     Lipid panel; Future; Expected date: 08/13/2019  Well controlled, continue current meds  Morbid obesity with BMI of 40.0-44.9, adult  Limit carbs, increase exercise as much possible    Medication List with Changes/Refills   New Medications    ERGOCALCIFEROL  (ERGOCALCIFEROL) 50,000 UNIT CAP    Take 1 capsule (50,000 Units total) by mouth every 7 days.   Current Medications    ALBUTEROL (PROAIR HFA) 90 MCG/ACTUATION INHALER    Inhale 2 puffs into the lungs every 4 (four) hours as needed for Wheezing or Shortness of Breath. Rescue    ALBUTEROL (PROVENTIL) 2.5 MG /3 ML (0.083 %) NEBULIZER SOLUTION    Take 3 mLs (2.5 mg total) by nebulization every 4 (four) hours as needed for Wheezing or Shortness of Breath. Rescue    ASPIRIN (ECOTRIN) 81 MG EC TABLET    Take 81 mg by mouth once daily.    ATORVASTATIN (LIPITOR) 10 MG TABLET    TAKE 1 TABLET(10 MG) BY MOUTH EVERY DAY    CALCIUM-VITAMIN D (CALCIUM 600 + D,3,) 600 MG(1,500MG) -400 UNIT TAB    Take by mouth.    CETIRIZINE (ZYRTEC) 10 MG TABLET    Take 10 mg by mouth once daily.    FLUTICASONE (FLONASE) 50 MCG/ACTUATION NASAL SPRAY    2 sprays by Each Nare route daily as needed. 2 Spray, Suspension Nasal Every day    METFORMIN (GLUCOPHAGE-XR) 500 MG 24 HR TABLET    TAKE 2 TABLETS(1000 MG) BY MOUTH EVERY DAY WITH DINNER    OSPEMIFENE (OSPHENA) 60 MG TAB    Take 60 mg by mouth once daily.

## 2019-03-15 ENCOUNTER — OFFICE VISIT (OUTPATIENT)
Dept: PRIMARY CARE CLINIC | Facility: CLINIC | Age: 54
End: 2019-03-15
Payer: OTHER GOVERNMENT

## 2019-03-15 VITALS
HEIGHT: 63 IN | HEART RATE: 59 BPM | BODY MASS INDEX: 41.99 KG/M2 | RESPIRATION RATE: 18 BRPM | SYSTOLIC BLOOD PRESSURE: 98 MMHG | OXYGEN SATURATION: 98 % | DIASTOLIC BLOOD PRESSURE: 62 MMHG | TEMPERATURE: 98 F | WEIGHT: 237 LBS

## 2019-03-15 DIAGNOSIS — J01.00 ACUTE NON-RECURRENT MAXILLARY SINUSITIS: Primary | ICD-10-CM

## 2019-03-15 PROCEDURE — 99214 OFFICE O/P EST MOD 30 MIN: CPT | Mod: S$GLB,,, | Performed by: FAMILY MEDICINE

## 2019-03-15 PROCEDURE — 99214 PR OFFICE/OUTPT VISIT, EST, LEVL IV, 30-39 MIN: ICD-10-PCS | Mod: S$GLB,,, | Performed by: FAMILY MEDICINE

## 2019-03-15 PROCEDURE — 99999 PR PBB SHADOW E&M-EST. PATIENT-LVL IV: ICD-10-PCS | Mod: PBBFAC,,, | Performed by: FAMILY MEDICINE

## 2019-03-15 PROCEDURE — 99999 PR PBB SHADOW E&M-EST. PATIENT-LVL IV: CPT | Mod: PBBFAC,,, | Performed by: FAMILY MEDICINE

## 2019-03-15 RX ORDER — MELOXICAM 15 MG/1
1 TABLET ORAL NIGHTLY
COMMUNITY
Start: 2019-03-11 | End: 2020-01-29

## 2019-03-15 RX ORDER — AMOXICILLIN AND CLAVULANATE POTASSIUM 875; 125 MG/1; MG/1
1 TABLET, FILM COATED ORAL 2 TIMES DAILY
Qty: 20 TABLET | Refills: 0 | Status: SHIPPED | OUTPATIENT
Start: 2019-03-15 | End: 2019-03-25

## 2019-03-15 RX ORDER — TIZANIDINE 4 MG/1
TABLET ORAL
Refills: 0 | COMMUNITY
Start: 2019-03-11 | End: 2019-04-23

## 2019-03-15 RX ORDER — BENZONATATE 100 MG/1
CAPSULE ORAL
Refills: 0 | COMMUNITY
Start: 2019-03-11 | End: 2019-03-22 | Stop reason: ALTCHOICE

## 2019-03-15 NOTE — PROGRESS NOTES
Subjective:       Patient ID: Rosina Gary is a 54 y.o. female.    Chief Complaint: Cough (Patient is coughing up yellow phlegm. She was seen at urgent care Monday but is not feeling any better. )    Cough   This is a new problem. The current episode started 1 to 4 weeks ago. The problem has been gradually worsening. The problem occurs every few minutes. The cough is productive of purulent sputum. Associated symptoms include headaches, postnasal drip, rhinorrhea, sweats and wheezing. Pertinent negatives include no chest pain, chills, ear congestion, ear pain, fever, heartburn, hemoptysis, myalgias, nasal congestion, rash, sore throat, shortness of breath or weight loss. The symptoms are aggravated by lying down. She has tried OTC cough suppressant, body position changes, prescription cough suppressant and rest for the symptoms. The treatment provided no relief. Her past medical history is significant for asthma. There is no history of bronchiectasis, bronchitis, COPD, emphysema, environmental allergies or pneumonia.     Review of Systems   Constitutional: Negative for chills, fever and weight loss.   HENT: Positive for postnasal drip and rhinorrhea. Negative for ear pain and sore throat.    Eyes: Negative for visual disturbance.   Respiratory: Positive for cough and wheezing. Negative for hemoptysis and shortness of breath.    Cardiovascular: Negative for chest pain.   Gastrointestinal: Negative for diarrhea, heartburn and vomiting.   Genitourinary: Negative for difficulty urinating.   Musculoskeletal: Negative for myalgias.   Skin: Negative for rash.   Allergic/Immunologic: Negative for environmental allergies and immunocompromised state.   Neurological: Positive for headaches. Negative for dizziness, syncope and light-headedness.   Hematological: Does not bruise/bleed easily.       Objective:      Vitals:    03/15/19 1052 03/15/19 1125   BP: (!) 77/48 98/62   BP Location: Left arm Left arm   Patient Position:  "Sitting Sitting   BP Method: Large (Automatic) Large (Manual)   Pulse: (!) 59    Resp: 18    Temp: 98.4 °F (36.9 °C)    TempSrc: Oral    SpO2: 98%    Weight: 107.5 kg (237 lb)    Height: 5' 3" (1.6 m)      Physical Exam   Constitutional: She is oriented to person, place, and time. She appears well-developed and well-nourished.   HENT:   Head: Normocephalic and atraumatic.   Right Ear: Tympanic membrane normal.   Left Ear: Tympanic membrane normal.   Nose: Right sinus exhibits maxillary sinus tenderness. Left sinus exhibits maxillary sinus tenderness.   Mouth/Throat: Oropharynx is clear and moist.   Eyes: EOM are normal. Pupils are equal, round, and reactive to light.   Neck: Neck supple. No JVD present.   Cardiovascular: Normal rate, regular rhythm and normal heart sounds.   Pulmonary/Chest: Effort normal and breath sounds normal.   Musculoskeletal: She exhibits no edema.   Neurological: She is alert and oriented to person, place, and time.   Skin: Skin is warm and dry.   Nursing note and vitals reviewed.      Assessment:       1. Acute non-recurrent maxillary sinusitis        Plan:       Acute non-recurrent maxillary sinusitis  -     amoxicillin-clavulanate 875-125mg (AUGMENTIN) 875-125 mg per tablet; Take 1 tablet by mouth 2 (two) times daily. for 10 days  Dispense: 20 tablet; Refill: 0  Increase fluid intake, had antibiotics given duration and progression of symptoms.  Return to clinic for any new or worsening symptoms.    Medication List with Changes/Refills   New Medications    AMOXICILLIN-CLAVULANATE 875-125MG (AUGMENTIN) 875-125 MG PER TABLET    Take 1 tablet by mouth 2 (two) times daily. for 10 days   Current Medications    ALBUTEROL (PROAIR HFA) 90 MCG/ACTUATION INHALER    Inhale 2 puffs into the lungs every 4 (four) hours as needed for Wheezing or Shortness of Breath. Rescue    ALBUTEROL (PROVENTIL) 2.5 MG /3 ML (0.083 %) NEBULIZER SOLUTION    Take 3 mLs (2.5 mg total) by nebulization every 4 (four) hours " as needed for Wheezing or Shortness of Breath. Rescue    ASPIRIN (ECOTRIN) 81 MG EC TABLET    Take 81 mg by mouth once daily.    ATORVASTATIN (LIPITOR) 10 MG TABLET    TAKE 1 TABLET(10 MG) BY MOUTH EVERY DAY    BENZONATATE (TESSALON) 100 MG CAPSULE    TK 1 TO 2 CS PO TID PRN.    CALCIUM-VITAMIN D (CALCIUM 600 + D,3,) 600 MG(1,500MG) -400 UNIT TAB    Take by mouth.    CETIRIZINE (ZYRTEC) 10 MG TABLET    Take 10 mg by mouth once daily.    ERGOCALCIFEROL (ERGOCALCIFEROL) 50,000 UNIT CAP    Take 1 capsule (50,000 Units total) by mouth every 7 days.    FLUTICASONE (FLONASE) 50 MCG/ACTUATION NASAL SPRAY    2 sprays by Each Nare route daily as needed. 2 Spray, Suspension Nasal Every day    MELOXICAM (MOBIC) 15 MG TABLET    Take 1 tablet by mouth every evening.    METFORMIN (GLUCOPHAGE-XR) 500 MG 24 HR TABLET    TAKE 2 TABLETS(1000 MG) BY MOUTH EVERY DAY WITH DINNER    OSPEMIFENE (OSPHENA) 60 MG TAB    Take 60 mg by mouth once daily.    VIRTUSSIN AC  MG/5 ML SYRUP    TK 5 ML PO NIGHTLY PRN.

## 2019-03-22 ENCOUNTER — OFFICE VISIT (OUTPATIENT)
Dept: OBSTETRICS AND GYNECOLOGY | Facility: CLINIC | Age: 54
End: 2019-03-22
Payer: OTHER GOVERNMENT

## 2019-03-22 ENCOUNTER — OFFICE VISIT (OUTPATIENT)
Dept: PRIMARY CARE CLINIC | Facility: CLINIC | Age: 54
End: 2019-03-22
Payer: OTHER GOVERNMENT

## 2019-03-22 VITALS
HEIGHT: 63 IN | RESPIRATION RATE: 18 BRPM | DIASTOLIC BLOOD PRESSURE: 73 MMHG | SYSTOLIC BLOOD PRESSURE: 133 MMHG | WEIGHT: 235 LBS | BODY MASS INDEX: 41.64 KG/M2 | TEMPERATURE: 98 F | OXYGEN SATURATION: 97 % | HEART RATE: 62 BPM

## 2019-03-22 VITALS
DIASTOLIC BLOOD PRESSURE: 82 MMHG | HEIGHT: 63 IN | WEIGHT: 235.31 LBS | BODY MASS INDEX: 41.69 KG/M2 | SYSTOLIC BLOOD PRESSURE: 122 MMHG

## 2019-03-22 DIAGNOSIS — N95.0 POSTMENOPAUSAL BLEEDING: Primary | ICD-10-CM

## 2019-03-22 DIAGNOSIS — Z48.02 ENCOUNTER FOR REMOVAL OF SUTURES: Primary | ICD-10-CM

## 2019-03-22 PROCEDURE — 99213 PR OFFICE/OUTPT VISIT, EST, LEVL III, 20-29 MIN: ICD-10-PCS | Mod: S$GLB,,, | Performed by: OBSTETRICS & GYNECOLOGY

## 2019-03-22 PROCEDURE — 99214 PR OFFICE/OUTPT VISIT, EST, LEVL IV, 30-39 MIN: ICD-10-PCS | Mod: S$GLB,,, | Performed by: NURSE PRACTITIONER

## 2019-03-22 PROCEDURE — 99214 OFFICE O/P EST MOD 30 MIN: CPT | Mod: S$GLB,,, | Performed by: NURSE PRACTITIONER

## 2019-03-22 PROCEDURE — 99999 PR PBB SHADOW E&M-EST. PATIENT-LVL V: ICD-10-PCS | Mod: PBBFAC,,, | Performed by: NURSE PRACTITIONER

## 2019-03-22 PROCEDURE — 87510 GARDNER VAG DNA DIR PROBE: CPT

## 2019-03-22 PROCEDURE — 99999 PR PBB SHADOW E&M-EST. PATIENT-LVL V: CPT | Mod: PBBFAC,,, | Performed by: NURSE PRACTITIONER

## 2019-03-22 PROCEDURE — 87480 CANDIDA DNA DIR PROBE: CPT

## 2019-03-22 PROCEDURE — 99213 OFFICE O/P EST LOW 20 MIN: CPT | Mod: S$GLB,,, | Performed by: OBSTETRICS & GYNECOLOGY

## 2019-03-22 PROCEDURE — 99999 PR PBB SHADOW E&M-EST. PATIENT-LVL IV: ICD-10-PCS | Mod: PBBFAC,,, | Performed by: OBSTETRICS & GYNECOLOGY

## 2019-03-22 PROCEDURE — 99999 PR PBB SHADOW E&M-EST. PATIENT-LVL IV: CPT | Mod: PBBFAC,,, | Performed by: OBSTETRICS & GYNECOLOGY

## 2019-03-22 NOTE — PROGRESS NOTES
Chief Complaint  Chief Complaint   Patient presents with    Suture / Staple Removal       HPI    Rosina Gary is a 54 y.o. female that presents for suture removal.      Patient is emergency room approximately 7 days ago after cutting herself while carving a turkey.  Tetanus shot administered at the time.  A treated with antibiotics.  Currently on Augmentin for sinusitis.  Three sutures placed to index finger.  Presents to clinic for suture removal at this time.  Denies fever or chills.  No drainage from the site.  Described as painless.  Itchy.    PAST MEDICAL HISTORY:  Past Medical History:   Diagnosis Date    Asthma     Hyperlipidemia     Hypertension     Neuropathy in diabetes     Obesity     ZAYNAB (obstructive sleep apnea)     Pedal edema        PAST SURGICAL HISTORY:  Past Surgical History:   Procedure Laterality Date     SECTION      X 1    GASTRIC RESTRICTION SURGERY  2012    LOST 125#    SPINE SURGERY      TONSILLECTOMY, ADENOIDECTOMY      UMBILICAL HERNIA REPAIR         SOCIAL HISTORY:  Social History     Socioeconomic History    Marital status:      Spouse name: Not on file    Number of children: Not on file    Years of education: Not on file    Highest education level: Not on file   Social Needs    Financial resource strain: Not on file    Food insecurity - worry: Not on file    Food insecurity - inability: Not on file    Transportation needs - medical: Not on file    Transportation needs - non-medical: Not on file   Occupational History    Not on file   Tobacco Use    Smoking status: Former Smoker    Smokeless tobacco: Never Used   Substance and Sexual Activity    Alcohol use: Yes     Comment: occasionally    Drug use: No    Sexual activity: Yes     Partners: Male     Birth control/protection: None   Other Topics Concern    Not on file   Social History Narrative    Not on file       FAMILY HISTORY:  Family History   Problem Relation Age of Onset     Arthritis Mother     Asthma Mother     Diabetes Mother     Stroke Mother     Vision loss Mother     Arthritis Father     Diabetes Father     Hypertension Father     Arthritis Maternal Uncle     Diabetes Maternal Uncle     Hearing loss Maternal Uncle     Heart disease Maternal Uncle     Hypertension Maternal Uncle     Arthritis Paternal Aunt     Diabetes Paternal Aunt     Hypertension Paternal Aunt     Colon cancer Maternal Grandfather     Breast cancer Neg Hx     Ovarian cancer Neg Hx        ALLERGIES AND MEDICATIONS: updated and reviewed.  Review of patient's allergies indicates:   Allergen Reactions    Lisinopril Other (See Comments)     cough     Current Outpatient Medications   Medication Sig Dispense Refill    albuterol (PROAIR HFA) 90 mcg/actuation inhaler Inhale 2 puffs into the lungs every 4 (four) hours as needed for Wheezing or Shortness of Breath. Rescue 18 g 5    albuterol (PROVENTIL) 2.5 mg /3 mL (0.083 %) nebulizer solution Take 3 mLs (2.5 mg total) by nebulization every 4 (four) hours as needed for Wheezing or Shortness of Breath. Rescue 1 Box 5    amoxicillin-clavulanate 875-125mg (AUGMENTIN) 875-125 mg per tablet Take 1 tablet by mouth 2 (two) times daily. for 10 days 20 tablet 0    aspirin (ECOTRIN) 81 MG EC tablet Take 81 mg by mouth once daily.      atorvastatin (LIPITOR) 10 MG tablet TAKE 1 TABLET(10 MG) BY MOUTH EVERY DAY 90 tablet 1    calcium-vitamin D (CALCIUM 600 + D,3,) 600 mg(1,500mg) -400 unit Tab Take by mouth.      cetirizine (ZYRTEC) 10 MG tablet Take 10 mg by mouth once daily.      ergocalciferol (ERGOCALCIFEROL) 50,000 unit Cap Take 1 capsule (50,000 Units total) by mouth every 7 days. 12 capsule 1    fluticasone (FLONASE) 50 mcg/actuation nasal spray 2 sprays by Each Nare route daily as needed. 2 Spray, Suspension Nasal Every day      meloxicam (MOBIC) 15 MG tablet Take 1 tablet by mouth every evening.      metFORMIN (GLUCOPHAGE-XR) 500 MG 24 hr tablet  "TAKE 2 TABLETS(1000 MG) BY MOUTH EVERY DAY WITH DINNER 180 tablet 1    ospemifene (OSPHENA) 60 mg Tab Take 60 mg by mouth once daily. 90 tablet 3    VIRTUSSIN AC  mg/5 mL syrup TK 5 ML PO NIGHTLY PRN.  0     No current facility-administered medications for this visit.          ROS  Review of Systems   Constitutional: Negative for chills and fever.   HENT: Negative for ear pain, postnasal drip and sinus pain.    Respiratory: Negative for cough and shortness of breath.    Cardiovascular: Negative for chest pain.   Gastrointestinal: Negative for diarrhea, nausea and vomiting.   Skin: Positive for wound.           PHYSICAL EXAM  Vitals:    03/22/19 1024   BP: 133/73   BP Location: Right arm   Patient Position: Sitting   BP Method: Medium (Automatic)   Pulse: 62   Resp: 18   Temp: 98.3 °F (36.8 °C)   TempSrc: Oral   SpO2: 97%   Weight: 106.6 kg (235 lb)   Height: 5' 3" (1.6 m)    Body mass index is 41.63 kg/m².  Weight: 106.6 kg (235 lb)   Height: 5' 3" (160 cm)     Physical Exam   Constitutional: She is oriented to person, place, and time. She appears well-developed and well-nourished.   HENT:   Head: Normocephalic.   Right Ear: Tympanic membrane normal.   Left Ear: Tympanic membrane normal.   Mouth/Throat: Uvula is midline, oropharynx is clear and moist and mucous membranes are normal.   Eyes: Conjunctivae are normal.   Cardiovascular: Normal rate, regular rhythm, normal heart sounds and normal pulses.   No murmur heard.  Pulses:       Radial pulses are 2+ on the right side, and 2+ on the left side.   No LE swelling noted   Pulmonary/Chest: Effort normal and breath sounds normal. She has no wheezes.   Abdominal: Soft. Bowel sounds are normal. There is no tenderness.   Musculoskeletal: She exhibits no edema.   Lymphadenopathy:     She has no cervical adenopathy.   Neurological: She is alert and oriented to person, place, and time.   Skin: Skin is warm and dry. No rash noted.        Psychiatric: She has a normal " mood and affect.         Health Maintenance       Date Due Completion Date    Eye Exam 05/06/2019 5/6/2018 (Done)    Override on 5/6/2018: Done (Dr. Schoenberger)    Urine Microalbumin 07/30/2019 7/30/2018    Foot Exam 08/06/2019 8/6/2018    Hemoglobin A1c 08/06/2019 2/6/2019    Override on 6/28/2017: Done (A1C  6.1   report sent to scanning   kb)    Override on 11/30/2016: Done (A1C 6.8 report sent to scanning  kb)    Override on 5/16/2016: Done (A1C 7.1   report sent to scanning  kb)    Override on 2/26/2016: Done (A1C  7.7   report sent to scanning   kb)    Lipid Panel 02/06/2020 2/6/2019    Override on 6/28/2017: Done (Total chol 124   report sent to scanning   kb)    Override on 11/30/2016: Done (Dr Thomas Gill   labs sent to scanning  kb)    Low Dose Statin 03/22/2020 3/22/2019    Mammogram 08/13/2020 8/13/2018    Pap Smear with HPV Cotest 08/13/2021 8/13/2018    Colonoscopy 08/06/2026 8/6/2016 (Done)    Override on 8/6/2016: Done (Dr. Ware (normal))    TETANUS VACCINE 03/15/2029 3/15/2019            Assessment & Plan    Problem List Items Addressed This Visit     None      Visit Diagnoses     Encounter for removal of sutures    -  Primary  Continue to treat topically with antibiotic.  Okay to cover during the day believe open at night to allow for healing.          Follow-up: Follow-up if symptoms worsen or fail to improve, for As planned with Dr. Gill.    Bonnie Taveras    Medication List with Changes/Refills   Current Medications    ALBUTEROL (PROAIR HFA) 90 MCG/ACTUATION INHALER    Inhale 2 puffs into the lungs every 4 (four) hours as needed for Wheezing or Shortness of Breath. Rescue    ALBUTEROL (PROVENTIL) 2.5 MG /3 ML (0.083 %) NEBULIZER SOLUTION    Take 3 mLs (2.5 mg total) by nebulization every 4 (four) hours as needed for Wheezing or Shortness of Breath. Rescue    AMOXICILLIN-CLAVULANATE 875-125MG (AUGMENTIN) 875-125 MG PER TABLET    Take 1 tablet by mouth 2 (two) times daily. for 10 days     ASPIRIN (ECOTRIN) 81 MG EC TABLET    Take 81 mg by mouth once daily.    ATORVASTATIN (LIPITOR) 10 MG TABLET    TAKE 1 TABLET(10 MG) BY MOUTH EVERY DAY    CALCIUM-VITAMIN D (CALCIUM 600 + D,3,) 600 MG(1,500MG) -400 UNIT TAB    Take by mouth.    CETIRIZINE (ZYRTEC) 10 MG TABLET    Take 10 mg by mouth once daily.    ERGOCALCIFEROL (ERGOCALCIFEROL) 50,000 UNIT CAP    Take 1 capsule (50,000 Units total) by mouth every 7 days.    FLUTICASONE (FLONASE) 50 MCG/ACTUATION NASAL SPRAY    2 sprays by Each Nare route daily as needed. 2 Spray, Suspension Nasal Every day    MELOXICAM (MOBIC) 15 MG TABLET    Take 1 tablet by mouth every evening.    METFORMIN (GLUCOPHAGE-XR) 500 MG 24 HR TABLET    TAKE 2 TABLETS(1000 MG) BY MOUTH EVERY DAY WITH DINNER    OSPEMIFENE (OSPHENA) 60 MG TAB    Take 60 mg by mouth once daily.    VIRTUSSIN AC  MG/5 ML SYRUP    TK 5 ML PO NIGHTLY PRN.   Discontinued Medications    BENZONATATE (TESSALON) 100 MG CAPSULE    TK 1 TO 2 CS PO TID PRN.

## 2019-03-22 NOTE — PROGRESS NOTES
History & Physical  Gynecology      SUBJECTIVE:     Chief Complaint: Vaginal Bleeding (19)       History of Present Illness:  Ms. Gary is a 54 yr old female who presents for a month of postmenopausal bleeding. She has not had a cycle in over 2 yrs then began with moderate bleeding last month. She has a hx of HLD, HTN, Obesity and diabetes. She also complains of some vaginal itching and irritation. She just completed a course of antibiotics.      Review of patient's allergies indicates:   Allergen Reactions    Lisinopril Other (See Comments)     cough       Past Medical History:   Diagnosis Date    Asthma     Hyperlipidemia     Hypertension     Neuropathy in diabetes     Obesity     ZAYNAB (obstructive sleep apnea)     Pedal edema      Past Surgical History:   Procedure Laterality Date     SECTION      X 1    GASTRIC RESTRICTION SURGERY  2012    LOST 125#    SPINE SURGERY      TONSILLECTOMY, ADENOIDECTOMY      UMBILICAL HERNIA REPAIR       OB History      Para Term  AB Living    1 1 1     1    SAB TAB Ectopic Multiple Live Births                     Family History   Problem Relation Age of Onset    Arthritis Mother     Asthma Mother     Diabetes Mother     Stroke Mother     Vision loss Mother     Arthritis Father     Diabetes Father     Hypertension Father     Arthritis Maternal Uncle     Diabetes Maternal Uncle     Hearing loss Maternal Uncle     Heart disease Maternal Uncle     Hypertension Maternal Uncle     Arthritis Paternal Aunt     Diabetes Paternal Aunt     Hypertension Paternal Aunt     Colon cancer Maternal Grandfather     Breast cancer Neg Hx     Ovarian cancer Neg Hx      Social History     Tobacco Use    Smoking status: Former Smoker    Smokeless tobacco: Never Used   Substance Use Topics    Alcohol use: Yes     Comment: occasionally    Drug use: No       Current Outpatient Medications   Medication Sig    albuterol (PROAIR HFA) 90  mcg/actuation inhaler Inhale 2 puffs into the lungs every 4 (four) hours as needed for Wheezing or Shortness of Breath. Rescue    albuterol (PROVENTIL) 2.5 mg /3 mL (0.083 %) nebulizer solution Take 3 mLs (2.5 mg total) by nebulization every 4 (four) hours as needed for Wheezing or Shortness of Breath. Rescue    amoxicillin-clavulanate 875-125mg (AUGMENTIN) 875-125 mg per tablet Take 1 tablet by mouth 2 (two) times daily. for 10 days    aspirin (ECOTRIN) 81 MG EC tablet Take 81 mg by mouth once daily.    atorvastatin (LIPITOR) 10 MG tablet TAKE 1 TABLET(10 MG) BY MOUTH EVERY DAY    benzonatate (TESSALON) 100 MG capsule TK 1 TO 2 CS PO TID PRN.    calcium-vitamin D (CALCIUM 600 + D,3,) 600 mg(1,500mg) -400 unit Tab Take by mouth.    cetirizine (ZYRTEC) 10 MG tablet Take 10 mg by mouth once daily.    ergocalciferol (ERGOCALCIFEROL) 50,000 unit Cap Take 1 capsule (50,000 Units total) by mouth every 7 days.    fluticasone (FLONASE) 50 mcg/actuation nasal spray 2 sprays by Each Nare route daily as needed. 2 Spray, Suspension Nasal Every day    meloxicam (MOBIC) 15 MG tablet Take 1 tablet by mouth every evening.    metFORMIN (GLUCOPHAGE-XR) 500 MG 24 hr tablet TAKE 2 TABLETS(1000 MG) BY MOUTH EVERY DAY WITH DINNER    ospemifene (OSPHENA) 60 mg Tab Take 60 mg by mouth once daily.    VIRTUSSIN AC  mg/5 mL syrup TK 5 ML PO NIGHTLY PRN.     No current facility-administered medications for this visit.          Review of Systems:  Review of Systems   Constitutional: Negative for activity change, fatigue, fever and unexpected weight change.   Respiratory: Negative for cough and shortness of breath.    Cardiovascular: Negative for chest pain and palpitations.   Gastrointestinal: Negative for abdominal pain, constipation, diarrhea and nausea.   Endocrine: Negative for hot flashes.   Genitourinary: Positive for postmenopausal bleeding. Negative for dyspareunia, dysuria, menorrhagia, menstrual problem, pelvic pain  and vaginal discharge.   Musculoskeletal: Negative for back pain.   Integumentary:  Negative for nipple discharge.   Neurological: Negative for headaches.   Psychiatric/Behavioral: The patient is not nervous/anxious.    Breast: Negative for nipple discharge       OBJECTIVE:     Physical Exam:  Physical Exam   Constitutional: She is oriented to person, place, and time. She appears well-developed and well-nourished.   HENT:   Head: Normocephalic and atraumatic.   Neck: Normal range of motion. Neck supple.   Cardiovascular: Normal rate, regular rhythm, normal heart sounds and intact distal pulses.   Pulmonary/Chest: Effort normal and breath sounds normal.   Abdominal: Soft. Bowel sounds are normal. There is no tenderness. There is no guarding.   Genitourinary: There is no rash, tenderness, lesion or injury on the right labia. There is no rash, tenderness, lesion or injury on the left labia. Uterus is not deviated, not enlarged, not fixed and not tender. Cervix exhibits no motion tenderness, no discharge and no friability. Right adnexum displays no mass, no tenderness and no fullness. Left adnexum displays no mass, no tenderness and no fullness. No tenderness or bleeding in the vagina. No foreign body in the vagina. No signs of injury around the vagina. No vaginal discharge found.   Neurological: She is alert and oriented to person, place, and time.   Skin: Skin is warm and dry.   Psychiatric: She has a normal mood and affect.   Vitals reviewed.        ASSESSMENT:       ICD-10-CM ICD-9-CM    1. Postmenopausal bleeding N95.0 627.1 Vaginosis Screen by DNA Probe      US Pelvis Comp with Transvag NON-OB (xpd          Plan:      Hx of postmenopausal bleeding x 1 month  Will order TVUS to evaluate uterine lining. If >4mm will schedule for embx  RTC PRN results of ultrasound    Counseling time: 15 minutes    Aashish Ojeda

## 2019-03-25 ENCOUNTER — TELEPHONE (OUTPATIENT)
Dept: OBSTETRICS AND GYNECOLOGY | Facility: CLINIC | Age: 54
End: 2019-03-25

## 2019-03-25 LAB
BACTERIAL VAGINOSIS DNA: NEGATIVE
CANDIDA GLABRATA DNA: NEGATIVE
CANDIDA KRUSEI DNA: NEGATIVE
CANDIDA RRNA VAG QL PROBE: POSITIVE
T VAGINALIS RRNA GENITAL QL PROBE: NEGATIVE

## 2019-03-25 RX ORDER — FLUCONAZOLE 150 MG/1
150 TABLET ORAL DAILY
Qty: 2 TABLET | Refills: 0 | Status: SHIPPED | OUTPATIENT
Start: 2019-03-25 | End: 2019-03-26

## 2019-03-25 NOTE — TELEPHONE ENCOUNTER
----- Message from Aashish Ojeda MD sent at 3/25/2019  1:50 PM CDT -----  Results reviewed. + yeast. Rx for diflucan sent to pharmacy. [facility] message sent. Please notify patient.

## 2019-04-03 ENCOUNTER — PROCEDURE VISIT (OUTPATIENT)
Dept: OBSTETRICS AND GYNECOLOGY | Facility: CLINIC | Age: 54
End: 2019-04-03
Payer: OTHER GOVERNMENT

## 2019-04-03 VITALS
DIASTOLIC BLOOD PRESSURE: 72 MMHG | SYSTOLIC BLOOD PRESSURE: 118 MMHG | WEIGHT: 230.94 LBS | BODY MASS INDEX: 40.92 KG/M2 | HEIGHT: 63 IN

## 2019-04-03 DIAGNOSIS — N95.0 POSTMENOPAUSAL BLEEDING: ICD-10-CM

## 2019-04-03 DIAGNOSIS — N95.0 PMB (POSTMENOPAUSAL BLEEDING): Primary | ICD-10-CM

## 2019-04-03 LAB
B-HCG UR QL: NEGATIVE
CTP QC/QA: YES

## 2019-04-03 PROCEDURE — 81025 URINE PREGNANCY TEST: CPT | Mod: S$GLB,,, | Performed by: OBSTETRICS & GYNECOLOGY

## 2019-04-03 PROCEDURE — 88305 TISSUE EXAM BY PATHOLOGIST: CPT | Mod: 26,,, | Performed by: PATHOLOGY

## 2019-04-03 PROCEDURE — 58100 PR BIOPSY OF UTERUS LINING: ICD-10-PCS | Mod: S$GLB,,, | Performed by: OBSTETRICS & GYNECOLOGY

## 2019-04-03 PROCEDURE — 88305 TISSUE EXAM BY PATHOLOGIST: CPT | Performed by: PATHOLOGY

## 2019-04-03 PROCEDURE — 88305 TISSUE SPECIMEN TO PATHOLOGY, OBSTETRICS/GYNECOLOGY: ICD-10-PCS | Mod: 26,,, | Performed by: PATHOLOGY

## 2019-04-03 PROCEDURE — 81025 POCT URINE PREGNANCY: ICD-10-PCS | Mod: S$GLB,,, | Performed by: OBSTETRICS & GYNECOLOGY

## 2019-04-03 PROCEDURE — 58100 BIOPSY OF UTERUS LINING: CPT | Mod: S$GLB,,, | Performed by: OBSTETRICS & GYNECOLOGY

## 2019-04-03 NOTE — PROCEDURES
DATE: April3rd, 2019    TIME: 1100 AM    PROCEDURE: Endometrial biopsy    INDICATION: Postmenopausal bleeding    PATIENT CONSENT:      PRE ENDOMETRIAL BIOPSY COUNSELING:   The patient was informed of the risk of bleeding, infection, uterine perforation and  pain and that the test will rule-out endometrial cancer with accuracy greater than   95%. She was counseled on the alternatives to endometrial biopsy.  All of her   questions were answered.  Patient gives written consent    ANESTHESIA: None    Labs: UPT performed prior to the procedure was negative.    PROCEDURE NOTE:  The cervix was visualized with a speculum and swabbed with Betadinex3.  The anterior lip of the cervix was grasped with a single toothed tenaculum, and a sterile endometrial pipelle was inserted into the uterus to a sound length of 7 cm. 2 passes were made with the pipelle and slight amount of tissue was obtained. The specimen was placed in formalin and sent to Pathology for evaluation.     COMPLICATIONS: None    DISPOSITION: The patient tolerated the above procedure well.      POST ENDOMETRIAL BIOPSY COUNSELING:  - Manage post biopsy cramping with NSAIDs or Tylenol.  - Expect spotting or light bleeding for a few days.  - Report bleeding heavier than a period, fever > 101.0 F, worsening pain or a foul  smelling vaginal discharge.      Aashish Ojeda MD 04/03/2019 11:16 AM

## 2019-04-11 DIAGNOSIS — M75.102 LEFT ROTATOR CUFF TEAR ARTHROPATHY: ICD-10-CM

## 2019-04-11 DIAGNOSIS — M12.812 LEFT ROTATOR CUFF TEAR ARTHROPATHY: ICD-10-CM

## 2019-04-11 RX ORDER — MELOXICAM 15 MG/1
TABLET ORAL
Qty: 30 TABLET | Refills: 3 | Status: SHIPPED | OUTPATIENT
Start: 2019-04-11 | End: 2019-04-23

## 2019-04-12 ENCOUNTER — PATIENT MESSAGE (OUTPATIENT)
Dept: OBSTETRICS AND GYNECOLOGY | Facility: CLINIC | Age: 54
End: 2019-04-12

## 2019-04-15 ENCOUNTER — TELEPHONE (OUTPATIENT)
Dept: OBSTETRICS AND GYNECOLOGY | Facility: CLINIC | Age: 54
End: 2019-04-15

## 2019-04-15 NOTE — TELEPHONE ENCOUNTER
----- Message from Aashish Ojeda MD sent at 4/15/2019 10:58 AM CDT -----  Pathology reviewed. Benign endometrial polyp. Negative for hyperplasia or malignancy. [facility] message sent. Please call patient to scheduled follow up

## 2019-04-17 PROBLEM — M75.92 SUPRASPINATUS TENDINITIS, LEFT: Status: RESOLVED | Noted: 2018-12-24 | Resolved: 2019-04-17

## 2019-04-18 RX ORDER — OSPEMIFENE 60 MG/1
TABLET, FILM COATED ORAL
Qty: 90 TABLET | Refills: 1 | Status: SHIPPED | OUTPATIENT
Start: 2019-04-18 | End: 2019-09-09 | Stop reason: SDUPTHER

## 2019-04-23 ENCOUNTER — OFFICE VISIT (OUTPATIENT)
Dept: OBSTETRICS AND GYNECOLOGY | Facility: CLINIC | Age: 54
End: 2019-04-23
Payer: OTHER GOVERNMENT

## 2019-04-23 VITALS
HEIGHT: 63 IN | BODY MASS INDEX: 41.68 KG/M2 | DIASTOLIC BLOOD PRESSURE: 76 MMHG | SYSTOLIC BLOOD PRESSURE: 104 MMHG | WEIGHT: 235.25 LBS

## 2019-04-23 DIAGNOSIS — N95.0 POSTMENOPAUSAL BLEEDING: Primary | ICD-10-CM

## 2019-04-23 DIAGNOSIS — Z01.818 PREOP EXAMINATION: ICD-10-CM

## 2019-04-23 PROCEDURE — 99999 PR PBB SHADOW E&M-EST. PATIENT-LVL IV: ICD-10-PCS | Mod: PBBFAC,,, | Performed by: OBSTETRICS & GYNECOLOGY

## 2019-04-23 PROCEDURE — 99499 UNLISTED E&M SERVICE: CPT | Mod: S$GLB,,, | Performed by: OBSTETRICS & GYNECOLOGY

## 2019-04-23 PROCEDURE — 99999 PR PBB SHADOW E&M-EST. PATIENT-LVL IV: CPT | Mod: PBBFAC,,, | Performed by: OBSTETRICS & GYNECOLOGY

## 2019-04-23 PROCEDURE — 99499 NO LOS: ICD-10-PCS | Mod: S$GLB,,, | Performed by: OBSTETRICS & GYNECOLOGY

## 2019-04-23 RX ORDER — SODIUM CHLORIDE, SODIUM LACTATE, POTASSIUM CHLORIDE, CALCIUM CHLORIDE 600; 310; 30; 20 MG/100ML; MG/100ML; MG/100ML; MG/100ML
INJECTION, SOLUTION INTRAVENOUS CONTINUOUS
Status: CANCELLED | OUTPATIENT
Start: 2019-04-23

## 2019-04-23 NOTE — PROGRESS NOTES
History & Physical  Gynecology      SUBJECTIVE:     Chief Complaint: Pre-op Exam       History of Present Illness:  Ms. Gary is a 54 yr old female with a history of postmenopausal bleeding who presents for preop for hysteroscopy/D&C. She has not had a cycle in over 2 yrs then began with moderate bleeding a couple of months ago.  Bleeding is minimal. She has a hx of HLD, HTN, obesity and diabetes. Endometrial biopsy was performed and was significant for an endometrial polyp.     FINAL PATHOLOGIC DIAGNOSIS  Endometrium (biopsy):  Endometrial polyp  Negative for hyperplasia/malignancy    Impression       Normal size heterogeneous uterus.  Right anterior mass is present measuring 1.3 x 1.1 x 1.5 cm.  There is a probable  scar measuring 7 x 7 mm.  Cervical nabothian cysts are noted.  The endocervical canal is heterogeneous.    Normal sonographic appearance of the ovaries bilaterally.          Review of patient's allergies indicates:   Allergen Reactions    Lisinopril Other (See Comments)     cough       Past Medical History:   Diagnosis Date    Asthma     Hyperlipidemia     Hypertension     Neuropathy in diabetes     Obesity     ZAYNAB (obstructive sleep apnea)     Pedal edema      Past Surgical History:   Procedure Laterality Date     SECTION      X 1    GASTRIC RESTRICTION SURGERY      LOST 125#    SPINE SURGERY      TONSILLECTOMY, ADENOIDECTOMY      UMBILICAL HERNIA REPAIR       OB History        1    Para   1    Term   1            AB        Living   1       SAB        TAB        Ectopic        Multiple        Live Births                   Family History   Problem Relation Age of Onset    Arthritis Mother     Asthma Mother     Diabetes Mother     Stroke Mother     Vision loss Mother     Arthritis Father     Diabetes Father     Hypertension Father     Arthritis Maternal Uncle     Diabetes Maternal Uncle     Hearing loss Maternal Uncle     Heart disease  Maternal Uncle     Hypertension Maternal Uncle     Arthritis Paternal Aunt     Diabetes Paternal Aunt     Hypertension Paternal Aunt     Colon cancer Maternal Grandfather     Breast cancer Neg Hx     Ovarian cancer Neg Hx      Social History     Tobacco Use    Smoking status: Former Smoker    Smokeless tobacco: Never Used   Substance Use Topics    Alcohol use: Yes     Comment: occasionally    Drug use: No       Current Outpatient Medications   Medication Sig    albuterol (PROAIR HFA) 90 mcg/actuation inhaler Inhale 2 puffs into the lungs every 4 (four) hours as needed for Wheezing or Shortness of Breath. Rescue    albuterol (PROVENTIL) 2.5 mg /3 mL (0.083 %) nebulizer solution Take 3 mLs (2.5 mg total) by nebulization every 4 (four) hours as needed for Wheezing or Shortness of Breath. Rescue    aspirin (ECOTRIN) 81 MG EC tablet Take 81 mg by mouth once daily.    atorvastatin (LIPITOR) 10 MG tablet TAKE 1 TABLET(10 MG) BY MOUTH EVERY DAY    calcium-vitamin D (CALCIUM 600 + D,3,) 600 mg(1,500mg) -400 unit Tab Take by mouth.    cetirizine (ZYRTEC) 10 MG tablet Take 10 mg by mouth once daily.    ergocalciferol (ERGOCALCIFEROL) 50,000 unit Cap Take 1 capsule (50,000 Units total) by mouth every 7 days.    fluticasone (FLONASE) 50 mcg/actuation nasal spray 2 sprays by Each Nare route daily as needed. 2 Spray, Suspension Nasal Every day    meloxicam (MOBIC) 15 MG tablet Take 1 tablet by mouth every evening.    metFORMIN (GLUCOPHAGE-XR) 500 MG 24 hr tablet TAKE 2 TABLETS(1000 MG) BY MOUTH EVERY DAY WITH DINNER    OSPHENA 60 mg Tab TAKE 1 TABLET(60MG) BY MOUTH ONCE DAILY      No current facility-administered medications for this visit.          Review of Systems:  Review of Systems   Constitutional: Negative for activity change, fatigue, fever and unexpected weight change.   Respiratory: Negative for cough and shortness of breath.    Cardiovascular: Negative for chest pain and palpitations.    Gastrointestinal: Negative for abdominal pain, constipation, diarrhea and nausea.   Endocrine: Negative for hot flashes.   Genitourinary: Negative for dyspareunia, dysuria, menorrhagia, menstrual problem, pelvic pain and vaginal discharge.   Musculoskeletal: Negative for back pain.   Integumentary:  Negative for nipple discharge.   Neurological: Negative for headaches.   Psychiatric/Behavioral: The patient is not nervous/anxious.    Breast: Negative for nipple discharge       OBJECTIVE:     Physical Exam:  Physical Exam   Constitutional: She is oriented to person, place, and time. She appears well-developed and well-nourished.   HENT:   Head: Normocephalic and atraumatic.   Neck: Normal range of motion. Neck supple.   Cardiovascular: Normal rate, regular rhythm, normal heart sounds and intact distal pulses.   Pulmonary/Chest: Effort normal and breath sounds normal.   Abdominal: Soft. Bowel sounds are normal. There is no tenderness. There is no guarding.   Neurological: She is alert and oriented to person, place, and time.   Skin: Skin is warm and dry.   Psychiatric: She has a normal mood and affect.   Vitals reviewed.        ASSESSMENT:       ICD-10-CM ICD-9-CM    1. Postmenopausal bleeding N95.0 627.1 POCT glucose      Notify physician if BS > 180 for hysterectomy patients      Notify Physician/Vital Signs Parameters Urine output less than 0.5mL/kg/hr (with indwelling catheter) or 30 mL/hr (without indwelling catheter) or blood glucose greater than 200 mg/dL      Notify physician      Notify Physician - Potential Need of Opioid Reversal      Chlorohexidine Gluconate Bath      Full code      Place in Outpatient      Vital signs      Diet NPO      Place KATHERINE hose      Place sequential compression device      Case Request Operating Room: HYSTEROSCOPY, WITH DILATION AND CURETTAGE OF UTERUS      Full code   2. Preop examination Z01.818 V72.84 EKG 12-lead      X-Ray Chest 1 View Pre-OP          Plan:      Discussed  risks, benefits and alternatives to proceeding with hysteroscopy/D&C for postmenopausal bleeding. Patient desires to proceed.   Pathology reviewed with patient. Endometrial polyp on endometrial biopsy.   Risks include uterine perforation, bleeding, infection. Pt voiced understanding  CXR and EKG ordered 2/2 pulmonary comorbidities. Recent CBC reviewed  Consents signed and added to chart. All questions answered  To OR on 5/14/19 for hysteroscopy/dilation and curettage      Counseling time: 15 minutes    Aashish Ojeda

## 2019-04-29 RX ORDER — FLUCONAZOLE 150 MG/1
150 TABLET ORAL DAILY
Qty: 2 TABLET | Refills: 0 | Status: SHIPPED | OUTPATIENT
Start: 2019-04-29 | End: 2019-09-09

## 2019-05-08 ENCOUNTER — TELEPHONE (OUTPATIENT)
Dept: OBSTETRICS AND GYNECOLOGY | Facility: CLINIC | Age: 54
End: 2019-05-08

## 2019-05-08 NOTE — TELEPHONE ENCOUNTER
----- Message from Josephine Mcdaniel sent at 5/8/2019 11:26 AM CDT -----  Contact: Pt   Name of Who is Calling: CHANTAL LOPEZ [[id number]]      What is the request in detail: Patient is requesting a call to confirm when she needs to stop taking her low dose aspirin before her procedure. Please contact to further discuss and advise      Can the clinic reply by [facility]: No       What Number to Call Back if not in [facility]: [phone]

## 2019-05-08 NOTE — TELEPHONE ENCOUNTER
Spoke with pt. Pt advised, per Dr Ojeda, that she does not need to stop taking her aspirin. Pt verbalized understanding.

## 2019-05-14 PROBLEM — Z98.890 STATUS POST HYSTEROSCOPY: Status: ACTIVE | Noted: 2019-05-14

## 2019-05-14 PROBLEM — N95.0 POSTMENOPAUSAL BLEEDING: Status: ACTIVE | Noted: 2019-05-14

## 2019-05-21 ENCOUNTER — TELEPHONE (OUTPATIENT)
Dept: OBSTETRICS AND GYNECOLOGY | Facility: CLINIC | Age: 54
End: 2019-05-21

## 2019-06-17 ENCOUNTER — OFFICE VISIT (OUTPATIENT)
Dept: OBSTETRICS AND GYNECOLOGY | Facility: CLINIC | Age: 54
End: 2019-06-17
Payer: OTHER GOVERNMENT

## 2019-06-17 VITALS
HEIGHT: 63 IN | BODY MASS INDEX: 41.12 KG/M2 | WEIGHT: 232.06 LBS | SYSTOLIC BLOOD PRESSURE: 120 MMHG | DIASTOLIC BLOOD PRESSURE: 76 MMHG

## 2019-06-17 DIAGNOSIS — Z09 POSTOP CHECK: Primary | ICD-10-CM

## 2019-06-17 PROCEDURE — 99999 PR PBB SHADOW E&M-EST. PATIENT-LVL III: ICD-10-PCS | Mod: PBBFAC,,, | Performed by: OBSTETRICS & GYNECOLOGY

## 2019-06-17 PROCEDURE — 99024 POSTOP FOLLOW-UP VISIT: CPT | Mod: S$GLB,,, | Performed by: OBSTETRICS & GYNECOLOGY

## 2019-06-17 PROCEDURE — 99999 PR PBB SHADOW E&M-EST. PATIENT-LVL III: CPT | Mod: PBBFAC,,, | Performed by: OBSTETRICS & GYNECOLOGY

## 2019-06-17 PROCEDURE — 99024 PR POST-OP FOLLOW-UP VISIT: ICD-10-PCS | Mod: S$GLB,,, | Performed by: OBSTETRICS & GYNECOLOGY

## 2019-06-17 NOTE — PROGRESS NOTES
History & Physical  Gynecology      SUBJECTIVE:     Chief Complaint: Post-op Evaluation       History of Present Illness:  Ms. Koenig is a 54 yr old female who presents for postop hystereroscopy/D&C for PMB. She has no complaints. Had some mild spotting after surgery x 2 days. No vaginal bleeding since. Findings from hysteroscopy discussed with patient which were significant for atrophy. Pathology findings reviewed with patient.    FINAL PATHOLOGIC DIAGNOSIS  Uterus, endometrium, curetting:  -MINUTE FRAGMENT OF INACTIVE ENDOMETRIUM, NEGATIVE FOR HYPERPLASIA OR MALIGNANCY, see  comment  -PREDOMINANTLY FRAGMENTS OF CERVICAL EPITHELIUM, NEGATIVE FOR DYSPLASIA  -FRAGMENTS OF ENDOCERVICAL EPITHELIUM AND STROMA, NEGATIVE FOR DYSPLASIA  COMMENT: The endometrial tissue is fairly small and therefore limits the evaluation. If an endometrial process is  of clinical concern, re-biopsy is recommended if clinically indicated.      Review of patient's allergies indicates:   Allergen Reactions    Lisinopril Other (See Comments)     cough       Past Medical History:   Diagnosis Date    Asthma     Diabetes     Hyperlipidemia     Neuropathy in diabetes     Obesity     ZAYNAB (obstructive sleep apnea)     Pedal edema      Past Surgical History:   Procedure Laterality Date     SECTION      X 1    GASTRIC RESTRICTION SURGERY  2012    LOST 125#    HYSTEROSCOPY, WITH DILATION AND CURETTAGE OF UTERUS N/A 2019    Performed by Aashish O[facility]eda MD at [facility] [place]    SPINE SURGERY      TONSILLECTOMY, ADENOIDECTOMY      UMBILICAL HERNIA REPAIR       OB History        1    Para   1    Term   1            AB        Living   1       SAB        TAB        Ectopic        Multiple        Live Births                   Family History   Problem Relation Age of Onset    Arthritis Mother     Asthma Mother     Diabetes Mother     Stroke Mother     Vision loss Mother     Arthritis Father     Diabetes Father      Hypertension Father     Arthritis Maternal Uncle     Diabetes Maternal Uncle     Hearing loss Maternal Uncle     Heart disease Maternal Uncle     Hypertension Maternal Uncle     Arthritis Paternal Aunt     Diabetes Paternal Aunt     Hypertension Paternal Aunt     Colon cancer Maternal Grandfather     Breast cancer Neg Hx     Ovarian cancer Neg Hx      Social History     Tobacco Use    Smoking status: Former Smoker    Smokeless tobacco: Never Used   Substance Use Topics    Alcohol use: Yes     Comment: occasionally    Drug use: No       Current Outpatient Medications   Medication Sig    albuterol (PROAIR HFA) 90 mcg/actuation inhaler Inhale 2 puffs into the lungs every 4 (four) hours as needed for Wheezing or Shortness of Breath. Rescue    albuterol (PROVENTIL) 2.5 mg /3 mL (0.083 %) nebulizer solution Take 3 mLs (2.5 mg total) by nebulization every 4 (four) hours as needed for Wheezing or Shortness of Breath. Rescue    aspirin (ECOTRIN) 81 MG EC tablet Take 81 mg by mouth once daily.    atorvastatin (LIPITOR) 10 MG tablet TAKE 1 TABLET(10 MG) BY MOUTH EVERY DAY    calcium-vitamin D (CALCIUM 600 + D,3,) 600 mg(1,500mg) -400 unit Tab Take by mouth.    cetirizine (ZYRTEC) 10 MG tablet Take 10 mg by mouth once daily.    ergocalciferol (ERGOCALCIFEROL) 50,000 unit Cap Take 1 capsule (50,000 Units total) by mouth every 7 days.    fluticasone (FLONASE) 50 mcg/actuation nasal spray 2 sprays by Each Nare route daily as needed. 2 Spray, Suspension Nasal Every day    HYDROcodone-acetaminophen (NORCO) 5-325 mg per tablet Take 1 tablet by mouth every 6 (six) hours as needed for Pain.    ibuprofen (ADVIL,MOTRIN) 800 MG tablet Take 1 tablet (800 mg total) by mouth every 8 (eight) hours as needed for Pain.    meloxicam (MOBIC) 15 MG tablet Take 1 tablet by mouth every evening.    metFORMIN (GLUCOPHAGE-XR) 500 MG 24 hr tablet TAKE 2 TABLETS(1000 MG) BY MOUTH EVERY DAY WITH DINNER    OSPHENA 60 mg Tab TAKE  1 TABLET(60MG) BY MOUTH ONCE DAILY     fluconazole (DIFLUCAN) 150 MG Tab Take 1 tablet (150 mg total) by mouth once daily. If no improvement in symptoms in 72 hours, may take second tablet.     No current facility-administered medications for this visit.          Review of Systems:  Review of Systems   Constitutional: Negative for activity change, fatigue, fever and unexpected weight change.   Respiratory: Negative for cough and shortness of breath.    Cardiovascular: Negative for chest pain and palpitations.   Gastrointestinal: Negative for abdominal pain, constipation, diarrhea and nausea.   Endocrine: Negative for hot flashes.   Genitourinary: Negative for dyspareunia, dysuria, menorrhagia, menstrual problem, pelvic pain and vaginal discharge.   Musculoskeletal: Negative for back pain.   Integumentary:  Negative for nipple discharge.   Neurological: Negative for headaches.   Psychiatric/Behavioral: The patient is not nervous/anxious.    Breast: Negative for nipple discharge       OBJECTIVE:     Physical Exam:  Physical Exam   Constitutional: She is oriented to person, place, and time. She appears well-developed and well-nourished.   HENT:   Head: Normocephalic and atraumatic.   Neck: Normal range of motion. Neck supple.   Cardiovascular: Normal rate, regular rhythm, normal heart sounds and intact distal pulses.   Pulmonary/Chest: Effort normal and breath sounds normal.   Abdominal: Soft. Bowel sounds are normal. There is no tenderness. There is no guarding.   Genitourinary: There is no rash, tenderness, lesion or injury on the right labia. There is no rash, tenderness, lesion or injury on the left labia. Uterus is not deviated, not enlarged, not fixed and not tender. Cervix exhibits no motion tenderness, no discharge and no friability. Right adnexum displays no mass, no tenderness and no fullness. Left adnexum displays no mass, no tenderness and no fullness. No erythema, tenderness or bleeding in the vagina. No  foreign body in the vagina. No signs of injury around the vagina. No vaginal discharge found.   Neurological: She is alert and oriented to person, place, and time.   Skin: Skin is warm and dry.   Psychiatric: She has a normal mood and affect.   Vitals reviewed.        ASSESSMENT:       ICD-10-CM ICD-9-CM    1. Postop check Z09 V67.00           Plan:      Benign postop course. Denies any vaginal bleeding  Finding and pathology reveals atrophy as most likely cause of PMB  Continue expectant management as patient is asymptomatic at this time  RTC in 2 mos for annual exam    Counseling time: 15 minutes    Aashish Ojeda

## 2019-08-30 DIAGNOSIS — E11.9 TYPE 2 DIABETES MELLITUS WITHOUT COMPLICATION, UNSPECIFIED WHETHER LONG TERM INSULIN USE: ICD-10-CM

## 2019-09-09 ENCOUNTER — OFFICE VISIT (OUTPATIENT)
Dept: OBSTETRICS AND GYNECOLOGY | Facility: CLINIC | Age: 54
End: 2019-09-09
Payer: OTHER GOVERNMENT

## 2019-09-09 VITALS
SYSTOLIC BLOOD PRESSURE: 126 MMHG | HEIGHT: 63 IN | WEIGHT: 227.63 LBS | BODY MASS INDEX: 40.33 KG/M2 | DIASTOLIC BLOOD PRESSURE: 82 MMHG

## 2019-09-09 DIAGNOSIS — Z01.419 WELL WOMAN EXAM WITH ROUTINE GYNECOLOGICAL EXAM: ICD-10-CM

## 2019-09-09 DIAGNOSIS — Z12.39 ENCOUNTER FOR SCREENING FOR MALIGNANT NEOPLASM OF BREAST: Primary | ICD-10-CM

## 2019-09-09 PROCEDURE — 99396 PR PREVENTIVE VISIT,EST,40-64: ICD-10-PCS | Mod: S$GLB,,, | Performed by: OBSTETRICS & GYNECOLOGY

## 2019-09-09 PROCEDURE — 99999 PR PBB SHADOW E&M-EST. PATIENT-LVL III: ICD-10-PCS | Mod: PBBFAC,,, | Performed by: OBSTETRICS & GYNECOLOGY

## 2019-09-09 PROCEDURE — 99999 PR PBB SHADOW E&M-EST. PATIENT-LVL III: CPT | Mod: PBBFAC,,, | Performed by: OBSTETRICS & GYNECOLOGY

## 2019-09-09 PROCEDURE — 99396 PREV VISIT EST AGE 40-64: CPT | Mod: S$GLB,,, | Performed by: OBSTETRICS & GYNECOLOGY

## 2019-09-09 NOTE — PROGRESS NOTES
History & Physical  Gynecology      SUBJECTIVE:     Chief Complaint: Gynecologic Exam       History of Present Illness:  Ms. Koenig is a 54 yr old female who presents for annual, well woman exam. Complaints: None. Postmenopausal. Denies vaginal bleeding. No hx abnormal paps. Last pap was 2018. Currently sexually active. Denies hx of STIs. Denies fam hx of breast, ovarian or colon cancer. Feels safe at home, wears seatbelts, exercises intermittently.        Review of patient's allergies indicates:   Allergen Reactions    Lisinopril Other (See Comments)     cough       Past Medical History:   Diagnosis Date    Asthma     Diabetes     Hyperlipidemia     Neuropathy in diabetes     Obesity     ZAYNAB (obstructive sleep apnea)     Pedal edema      Past Surgical History:   Procedure Laterality Date     SECTION      X 1    GASTRIC RESTRICTION SURGERY  2012    LOST 125#    HYSTEROSCOPY, WITH DILATION AND CURETTAGE OF UTERUS N/A 2019    Performed by [facility]ish [facility]eda [facility] at [facility] OR    SPINE SURGERY      TONSILLECTOMY, ADENOIDECTOMY      UMBILICAL HERNIA REPAIR       OB History        1    Para   1    Term   1            AB        Living   1       SAB        TAB        Ectopic        Multiple        Live Births                   Family History   Problem Relation Age of Onset    Arthritis Mother     Asthma Mother     Diabetes Mother     Stroke Mother     Vision loss Mother     Arthritis Father     Diabetes Father     Hypertension Father     Arthritis Maternal Uncle     Diabetes Maternal Uncle     Hearing loss Maternal Uncle     Heart disease Maternal Uncle     Hypertension Maternal Uncle     Arthritis Paternal Aunt     Diabetes Paternal Aunt     Hypertension Paternal Aunt     Colon cancer Maternal Grandfather     Breast cancer Neg Hx     Ovarian cancer Neg Hx      Social History     Tobacco Use    Smoking status: Former Smoker    Smokeless tobacco: Never Used    Substance Use Topics    Alcohol use: Yes     Comment: occasionally    Drug use: No       Current Outpatient Medications   Medication Sig    albuterol (PROAIR HFA) 90 mcg/actuation inhaler Inhale 2 puffs into the lungs every 4 (four) hours as needed for Wheezing or Shortness of Breath. Rescue    albuterol (PROVENTIL) 2.5 mg /3 mL (0.083 %) nebulizer solution Take 3 mLs (2.5 mg total) by nebulization every 4 (four) hours as needed for Wheezing or Shortness of Breath. Rescue    aspirin (ECOTRIN) 81 MG EC tablet Take 81 mg by mouth once daily.    atorvastatin (LIPITOR) 10 MG tablet TAKE 1 TABLET(10 MG) BY MOUTH EVERY DAY    calcium-vitamin D (CALCIUM 600 + D,3,) 600 mg(1,500mg) -400 unit Tab Take by mouth.    cetirizine (ZYRTEC) 10 MG tablet Take 10 mg by mouth once daily.    fluticasone (FLONASE) 50 mcg/actuation nasal spray 2 sprays by Each Nare route daily as needed. 2 Spray, Suspension Nasal Every day    meloxicam (MOBIC) 15 MG tablet Take 1 tablet by mouth every evening.    metFORMIN (GLUCOPHAGE-XR) 500 MG 24 hr tablet TAKE 2 TABLETS(1000 MG) BY MOUTH EVERY DAY WITH DINNER    ospemifene (OSPHENA) 60 mg Tab TAKE 1 TABLET(60MG) BY MOUTH ONCE DAILY     No current facility-administered medications for this visit.          Review of Systems:  Review of Systems   Constitutional: Negative for activity change, fatigue, fever and unexpected weight change.   Respiratory: Negative for cough and shortness of breath.    Cardiovascular: Negative for chest pain and palpitations.   Gastrointestinal: Negative for abdominal pain, constipation, diarrhea and nausea.   Endocrine: Negative for hot flashes.   Genitourinary: Negative for dyspareunia, dysuria, menorrhagia, menstrual problem, pelvic pain and vaginal discharge.   Musculoskeletal: Negative for back pain.   Integumentary:  Negative for nipple discharge.   Neurological: Negative for headaches.   Psychiatric/Behavioral: The patient is not nervous/anxious.     Breast: Negative for nipple discharge       OBJECTIVE:     Physical Exam:  Physical Exam   Constitutional: She is oriented to person, place, and time. She appears well-developed and well-nourished.   HENT:   Head: Normocephalic and atraumatic.   Neck: Normal range of motion. Neck supple.   Cardiovascular: Normal rate, regular rhythm, normal heart sounds and intact distal pulses.   Pulmonary/Chest: Effort normal and breath sounds normal. Right breast exhibits no inverted nipple, no mass, no nipple discharge, no skin change and no tenderness. Left breast exhibits no inverted nipple, no mass, no nipple discharge, no skin change and no tenderness.   Abdominal: Soft. Bowel sounds are normal. There is no tenderness. There is no guarding.   Genitourinary: There is no rash, tenderness, lesion or injury on the right labia. There is no rash, tenderness, lesion or injury on the left labia. Uterus is not deviated, not enlarged, not fixed and not tender. Cervix exhibits no motion tenderness, no discharge and no friability. Right adnexum displays no mass, no tenderness and no fullness. Left adnexum displays no mass, no tenderness and no fullness. No erythema, tenderness or bleeding in the vagina. No foreign body in the vagina. No signs of injury around the vagina. No vaginal discharge found.   Neurological: She is alert and oriented to person, place, and time.   Skin: Skin is warm and dry.   Psychiatric: She has a normal mood and affect.   Vitals reviewed.        ASSESSMENT:       ICD-10-CM ICD-9-CM    1. Encounter for screening for malignant neoplasm of breast Z12.31 V76.10 Mammo Digital Screening Bilat   2. Well woman exam with routine gynecological exam Z01.419 V72.31           Plan:      Rosina ORTIZ was seen today for gynecologic exam.    Diagnoses and all orders for this visit:    Encounter for screening for malignant neoplasm of breast  -     Mammo Digital Screening Bilat; Future    Well woman exam with routine gynecological  exam    Other orders  -     ospemifene (OSPHENA) 60 mg Tab; TAKE 1 TABLET(60MG) BY MOUTH ONCE DAILY    Next pap due 8/2021  Colonoscopy UTD    Orders Placed This Encounter   Procedures    Mammo Digital Screening Bilat       Follow up in about 1 year (around 9/9/2020), or if symptoms worsen or fail to improve, for annual.    Counseling time: 15 minutes    Aashish Ojeda

## 2019-09-10 ENCOUNTER — OFFICE VISIT (OUTPATIENT)
Dept: PRIMARY CARE CLINIC | Facility: CLINIC | Age: 54
End: 2019-09-10
Payer: OTHER GOVERNMENT

## 2019-09-10 ENCOUNTER — CLINICAL SUPPORT (OUTPATIENT)
Dept: PRIMARY CARE CLINIC | Facility: CLINIC | Age: 54
End: 2019-09-10
Attending: FAMILY MEDICINE
Payer: OTHER GOVERNMENT

## 2019-09-10 VITALS
SYSTOLIC BLOOD PRESSURE: 128 MMHG | DIASTOLIC BLOOD PRESSURE: 74 MMHG | OXYGEN SATURATION: 98 % | RESPIRATION RATE: 18 BRPM | WEIGHT: 227 LBS | HEIGHT: 63 IN | BODY MASS INDEX: 40.22 KG/M2 | TEMPERATURE: 99 F | HEART RATE: 68 BPM

## 2019-09-10 DIAGNOSIS — Z13.5 DIABETIC RETINOPATHY SCREENING: ICD-10-CM

## 2019-09-10 DIAGNOSIS — E55.9 VITAMIN D DEFICIENCY DISEASE: ICD-10-CM

## 2019-09-10 DIAGNOSIS — E11.69 TYPE 2 DIABETES MELLITUS WITH HYPERLIPIDEMIA: ICD-10-CM

## 2019-09-10 DIAGNOSIS — E78.5 HYPERLIPIDEMIA, UNSPECIFIED HYPERLIPIDEMIA TYPE: ICD-10-CM

## 2019-09-10 DIAGNOSIS — E78.5 TYPE 2 DIABETES MELLITUS WITH HYPERLIPIDEMIA: Primary | ICD-10-CM

## 2019-09-10 DIAGNOSIS — E78.5 TYPE 2 DIABETES MELLITUS WITH HYPERLIPIDEMIA: ICD-10-CM

## 2019-09-10 DIAGNOSIS — M79.662 PAIN IN LEFT LOWER LEG: ICD-10-CM

## 2019-09-10 DIAGNOSIS — E11.69 TYPE 2 DIABETES MELLITUS WITH HYPERLIPIDEMIA: Primary | ICD-10-CM

## 2019-09-10 DIAGNOSIS — Z23 NEED FOR VACCINATION: ICD-10-CM

## 2019-09-10 PROCEDURE — 92250 DIABETIC EYE SCREENING PHOTO: ICD-10-PCS | Mod: TC,S$GLB,, | Performed by: FAMILY MEDICINE

## 2019-09-10 PROCEDURE — 99999 PR PBB SHADOW E&M-EST. PATIENT-LVL III: CPT | Mod: PBBFAC,,, | Performed by: FAMILY MEDICINE

## 2019-09-10 PROCEDURE — 90471 FLU VACCINE (QUAD) GREATER THAN OR EQUAL TO 3YO PRESERVATIVE FREE IM: ICD-10-PCS | Mod: S$GLB,,, | Performed by: FAMILY MEDICINE

## 2019-09-10 PROCEDURE — 90686 FLU VACCINE (QUAD) GREATER THAN OR EQUAL TO 3YO PRESERVATIVE FREE IM: ICD-10-PCS | Mod: S$GLB,,, | Performed by: FAMILY MEDICINE

## 2019-09-10 PROCEDURE — 99214 OFFICE O/P EST MOD 30 MIN: CPT | Mod: 25,S$GLB,, | Performed by: FAMILY MEDICINE

## 2019-09-10 PROCEDURE — 90686 IIV4 VACC NO PRSV 0.5 ML IM: CPT | Mod: S$GLB,,, | Performed by: FAMILY MEDICINE

## 2019-09-10 PROCEDURE — 99214 PR OFFICE/OUTPT VISIT, EST, LEVL IV, 30-39 MIN: ICD-10-PCS | Mod: 25,S$GLB,, | Performed by: FAMILY MEDICINE

## 2019-09-10 PROCEDURE — 99999 PR PBB SHADOW E&M-EST. PATIENT-LVL III: ICD-10-PCS | Mod: PBBFAC,,, | Performed by: FAMILY MEDICINE

## 2019-09-10 PROCEDURE — 99999 PR PBB SHADOW E&M-EST. PATIENT-LVL I: ICD-10-PCS | Mod: PBBFAC,,,

## 2019-09-10 PROCEDURE — 92250 FUNDUS PHOTOGRAPHY W/I&R: CPT | Mod: TC,S$GLB,, | Performed by: FAMILY MEDICINE

## 2019-09-10 PROCEDURE — 92250 FUNDUS PHOTOGRAPHY W/I&R: CPT | Mod: 26,S$GLB,, | Performed by: OPHTHALMOLOGY

## 2019-09-10 PROCEDURE — 90471 IMMUNIZATION ADMIN: CPT | Mod: S$GLB,,, | Performed by: FAMILY MEDICINE

## 2019-09-10 PROCEDURE — 99999 PR PBB SHADOW E&M-EST. PATIENT-LVL I: CPT | Mod: PBBFAC,,,

## 2019-09-10 PROCEDURE — 92250 DIABETIC EYE SCREENING PHOTO: ICD-10-PCS | Mod: 26,S$GLB,, | Performed by: OPHTHALMOLOGY

## 2019-09-10 RX ORDER — ATORVASTATIN CALCIUM 10 MG/1
TABLET, FILM COATED ORAL
Qty: 90 TABLET | Refills: 1 | Status: SHIPPED | OUTPATIENT
Start: 2019-09-10 | End: 2020-03-13 | Stop reason: SDUPTHER

## 2019-09-10 RX ORDER — METFORMIN HYDROCHLORIDE 500 MG/1
TABLET, EXTENDED RELEASE ORAL
Qty: 180 TABLET | Refills: 1 | Status: SHIPPED | OUTPATIENT
Start: 2019-09-10 | End: 2020-03-05

## 2019-09-10 NOTE — PROGRESS NOTES
"Subjective:       Patient ID: Rosina Koenig is a 54 y.o. female.    Chief Complaint: Diabetes    Diabetes-A1c down to 6.4, compliant with meds, no hypoglycemia  Hyperlipidemia-lipid profile looks great, compliant with meds  Complains of persistent mild swelling and discomfort to anterior surface of left lower leg for about a year.  No known injury.  No erythema, warmth or skin breakdown.    Review of Systems   Constitutional: Negative for fatigue.   HENT: Negative for trouble swallowing.    Eyes: Negative for visual disturbance.   Respiratory: Negative for shortness of breath.    Cardiovascular: Negative for chest pain.   Gastrointestinal: Negative for nausea and vomiting.   Endocrine: Negative for polydipsia, polyphagia and polyuria.   Genitourinary: Negative for difficulty urinating.   Musculoskeletal: Positive for myalgias.   Skin: Negative for pallor.   Allergic/Immunologic: Negative for immunocompromised state.   Neurological: Negative for dizziness, tremors, seizures, speech difficulty, weakness and headaches.   Hematological: Does not bruise/bleed easily.   Psychiatric/Behavioral: Negative for confusion. The patient is not nervous/anxious.        Objective:      Vitals:    09/10/19 1602   BP: 128/74   BP Location: Left arm   Patient Position: Sitting   BP Method: Large (Automatic)   Pulse: 68   Resp: 18   Temp: 98.6 °F (37 °C)   TempSrc: Oral   SpO2: 98%   Weight: 103 kg (227 lb)   Height: 5' 3" (1.6 m)     Lab Results   Component Value Date    WBC 8.20 02/06/2019    HGB 13.2 02/06/2019    HCT 41.1 02/06/2019     02/06/2019    CHOL 133 09/09/2019    TRIG 94 09/09/2019    HDL 55 09/09/2019    ALT 19 09/09/2019    AST 22 09/09/2019     09/09/2019    K 4.1 09/09/2019     09/09/2019    CREATININE 1.0 09/09/2019    BUN 16 09/09/2019    CO2 26 09/09/2019    TSH 1.75 02/06/2019    HGBA1C 6.4 (H) 09/09/2019     Physical Exam   Constitutional: She is oriented to person, place, and time. She " appears well-developed and well-nourished.   HENT:   Head: Normocephalic and atraumatic.   Neck: No JVD present.   Cardiovascular: Normal rate, regular rhythm and normal heart sounds.   Pulses:       Dorsalis pedis pulses are 1+ on the right side, and 1+ on the left side.   Pulmonary/Chest: Effort normal and breath sounds normal.   Musculoskeletal: She exhibits no edema.        Legs:  Feet:   Right Foot:   Protective Sensation: 10 sites tested. 10 sites sensed.   Skin Integrity: Negative for ulcer, blister or skin breakdown.   Left Foot:   Protective Sensation: 10 sites tested. 10 sites sensed.   Skin Integrity: Negative for ulcer, blister or skin breakdown.   Neurological: She is alert and oriented to person, place, and time.   Skin: Skin is warm and dry.   Nursing note and vitals reviewed.      Assessment:       1. Type 2 diabetes mellitus with hyperlipidemia Well controlled   2. Hyperlipidemia, unspecified hyperlipidemia type Stable   3. Vitamin D deficiency disease    4. Need for vaccination    5. Pain in left lower leg    6. Diabetic retinopathy screening        Plan:       Type 2 diabetes mellitus with hyperlipidemia  -     metFORMIN (GLUCOPHAGE-XR) 500 MG 24 hr tablet; TAKE 2 TABLETS(1000 MG) BY MOUTH EVERY DAY WITH DINNER  Dispense: 180 tablet; Refill: 1  -     HM DIABETES FOOT EXAM  -     Comprehensive metabolic panel; Future; Expected date: 03/10/2020  -     Hemoglobin A1c; Future; Expected date: 03/10/2020  -     Diabetic Eye Screening Photo; Future; Expected date: 09/10/2019  Excellent glycemic control, continue current plan of care  Hyperlipidemia, unspecified hyperlipidemia type  -     atorvastatin (LIPITOR) 10 MG tablet; TAKE 1 TABLET(10 MG) BY MOUTH EVERY DAY  Dispense: 90 tablet; Refill: 1  -     Comprehensive metabolic panel; Future; Expected date: 03/10/2020  -     Lipid panel; Future; Expected date: 03/10/2020  Stable on current regimen  Vitamin D deficiency disease  Resolved  Need for  vaccination  -     Influenza - Quadrivalent (6 months+) (PF)    Pain in left lower leg  -     US Lower Extremity Veins Left; Future; Expected date: 09/10/2019  Possible varicose veins  Diabetic retinopathy screening  -     Diabetic Eye Screening Photo; Future; Expected date: 09/10/2019      Medication List with Changes/Refills   Current Medications    ALBUTEROL (PROAIR HFA) 90 MCG/ACTUATION INHALER    Inhale 2 puffs into the lungs every 4 (four) hours as needed for Wheezing or Shortness of Breath. Rescue    ALBUTEROL (PROVENTIL) 2.5 MG /3 ML (0.083 %) NEBULIZER SOLUTION    Take 3 mLs (2.5 mg total) by nebulization every 4 (four) hours as needed for Wheezing or Shortness of Breath. Rescue    ASPIRIN (ECOTRIN) 81 MG EC TABLET    Take 81 mg by mouth once daily.    CALCIUM-VITAMIN D (CALCIUM 600 + D,3,) 600 MG(1,500MG) -400 UNIT TAB    Take by mouth.    CETIRIZINE (ZYRTEC) 10 MG TABLET    Take 10 mg by mouth once daily.    FLUTICASONE (FLONASE) 50 MCG/ACTUATION NASAL SPRAY    2 sprays by Each Nare route daily as needed. 2 Spray, Suspension Nasal Every day    MELOXICAM (MOBIC) 15 MG TABLET    Take 1 tablet by mouth every evening.    OSPEMIFENE (OSPHENA) 60 MG TAB    TAKE 1 TABLET(60MG) BY MOUTH ONCE DAILY   Changed and/or Refilled Medications    Modified Medication Previous Medication    ATORVASTATIN (LIPITOR) 10 MG TABLET atorvastatin (LIPITOR) 10 MG tablet       TAKE 1 TABLET(10 MG) BY MOUTH EVERY DAY    TAKE 1 TABLET(10 MG) BY MOUTH EVERY DAY    METFORMIN (GLUCOPHAGE-XR) 500 MG 24 HR TABLET metFORMIN (GLUCOPHAGE-XR) 500 MG 24 hr tablet       TAKE 2 TABLETS(1000 MG) BY MOUTH EVERY DAY WITH DINNER    TAKE 2 TABLETS(1000 MG) BY MOUTH EVERY DAY WITH DINNER

## 2019-09-10 NOTE — PROGRESS NOTES
Verified pt ID using name and . Verified allergies. Administered flu vaccine in left deltoid per physician order using aseptic technique. Aspirated and no blood return noted. Pt tolerated well with no adverse reactions noted.

## 2019-09-19 ENCOUNTER — PATIENT MESSAGE (OUTPATIENT)
Dept: PRIMARY CARE CLINIC | Facility: CLINIC | Age: 54
End: 2019-09-19

## 2019-10-09 RX ORDER — OSPEMIFENE 60 MG/1
TABLET, FILM COATED ORAL
Qty: 90 TABLET | Refills: 0 | OUTPATIENT
Start: 2019-10-09

## 2020-01-29 ENCOUNTER — OFFICE VISIT (OUTPATIENT)
Dept: PRIMARY CARE CLINIC | Facility: CLINIC | Age: 55
End: 2020-01-29
Payer: OTHER GOVERNMENT

## 2020-01-29 VITALS
SYSTOLIC BLOOD PRESSURE: 110 MMHG | OXYGEN SATURATION: 99 % | HEART RATE: 64 BPM | DIASTOLIC BLOOD PRESSURE: 74 MMHG | TEMPERATURE: 99 F | HEIGHT: 63 IN | BODY MASS INDEX: 42.07 KG/M2 | WEIGHT: 237.44 LBS | RESPIRATION RATE: 18 BRPM

## 2020-01-29 DIAGNOSIS — J40 BRONCHITIS: Primary | ICD-10-CM

## 2020-01-29 PROCEDURE — 99999 PR PBB SHADOW E&M-EST. PATIENT-LVL IV: CPT | Mod: PBBFAC,,, | Performed by: NURSE PRACTITIONER

## 2020-01-29 PROCEDURE — 99213 PR OFFICE/OUTPT VISIT, EST, LEVL III, 20-29 MIN: ICD-10-PCS | Mod: S$GLB,,, | Performed by: NURSE PRACTITIONER

## 2020-01-29 PROCEDURE — 99213 OFFICE O/P EST LOW 20 MIN: CPT | Mod: S$GLB,,, | Performed by: NURSE PRACTITIONER

## 2020-01-29 PROCEDURE — 99999 PR PBB SHADOW E&M-EST. PATIENT-LVL IV: ICD-10-PCS | Mod: PBBFAC,,, | Performed by: NURSE PRACTITIONER

## 2020-01-29 RX ORDER — PREDNISONE 20 MG/1
TABLET ORAL
Qty: 10 TABLET | Refills: 0 | Status: SHIPPED | OUTPATIENT
Start: 2020-01-29 | End: 2020-02-05

## 2020-01-29 RX ORDER — ALBUTEROL SULFATE 90 UG/1
2 AEROSOL, METERED RESPIRATORY (INHALATION) EVERY 6 HOURS PRN
Qty: 18 G | Refills: 0 | Status: SHIPPED | OUTPATIENT
Start: 2020-01-29 | End: 2020-03-13 | Stop reason: SDUPTHER

## 2020-01-29 RX ORDER — AZITHROMYCIN 250 MG/1
TABLET, FILM COATED ORAL
Qty: 6 TABLET | Refills: 0 | Status: SHIPPED | OUTPATIENT
Start: 2020-01-29 | End: 2020-03-13 | Stop reason: ALTCHOICE

## 2020-01-29 NOTE — PROGRESS NOTES
Chief Complaint  Chief Complaint   Patient presents with    Sinusitis    Chest Congestion    Shortness of Breath       HPI    Rosina Koenig is a 55 y.o. female that presents for cough, congestion.    Reports the onset of sore throat 4 days ago. Sore throat continues, severe. Cough is productive, green. Wheezing. Chest tightness and shortness of breath. Headache. No nasal congestion. Ear itching. No n/v/d. No fever or chills. Sick contacts include  and recent travel to [place] last week.           PAST MEDICAL HISTORY:  Past Medical History:   Diagnosis Date    Asthma     Diabetes     Hyperlipidemia     Neuropathy in diabetes     Obesity     ZAYNAB (obstructive sleep apnea)     Pedal edema        PAST SURGICAL HISTORY:  Past Surgical History:   Procedure Laterality Date     SECTION      X 1    GASTRIC RESTRICTION SURGERY  2012    LOST 125#    HYSTEROSCOPY WITH DILATION AND CURETTAGE OF UTERUS N/A 2019    Procedure: HYSTEROSCOPY, WITH DILATION AND CURETTAGE OF UTERUS;  Surgeon: Aashish Ojeda MD;  Location: [facility];  Service: OB/GYN;  Laterality: N/A;  [facility] CONFIRMED /DME  [facility] [employer] [facility] CONFIRMED 5/10/    SPINE SURGERY      TONSILLECTOMY, ADENOIDECTOMY      UMBILICAL HERNIA REPAIR         SOCIAL HISTORY:  Social History     Socioeconomic History    Marital status:      Spouse name: Not on file    Number of children: Not on file    Years of education: Not on file    Highest education level: Not on file   Occupational History    Not on file   Social Needs    Financial resource strain: Not on file    Food insecurity:     Worry: Not on file     Inability: Not on file    Transportation needs:     Medical: Not on file     Non-medical: Not on file   Tobacco Use    Smoking status: Former Smoker    Smokeless tobacco: Never Used   Substance and Sexual Activity    Alcohol use: Yes     Comment: occasionally    Drug use: No    Sexual activity: Yes      Partners: Male     Birth control/protection: None   Lifestyle    Physical activity:     Days per week: Not on file     Minutes per session: Not on file    Stress: Not on file   Relationships    Social connections:     Talks on phone: Not on file     Gets together: Not on file     Attends Zoroastrian service: Not on file     Active member of club or organization: Not on file     Attends meetings of clubs or organizations: Not on file     Relationship status: Not on file   Other Topics Concern    Not on file   Social History Narrative    Not on file       FAMILY HISTORY:  Family History   Problem Relation Age of Onset    Arthritis Mother     Asthma Mother     Diabetes Mother     Stroke Mother     Vision loss Mother     Arthritis Father     Diabetes Father     Hypertension Father     Arthritis Maternal Uncle     Diabetes Maternal Uncle     Hearing loss Maternal Uncle     Heart disease Maternal Uncle     Hypertension Maternal Uncle     Arthritis Paternal Aunt     Diabetes Paternal Aunt     Hypertension Paternal Aunt     Colon cancer Maternal Grandfather     Breast cancer Neg Hx     Ovarian cancer Neg Hx        ALLERGIES AND MEDICATIONS: updated and reviewed.  Review of patient's allergies indicates:   Allergen Reactions    Lisinopril Other (See Comments)     cough     Current Outpatient Medications   Medication Sig Dispense Refill    albuterol (PROAIR HFA) 90 mcg/actuation inhaler Inhale 2 puffs into the lungs every 4 (four) hours as needed for Wheezing or Shortness of Breath. Rescue 18 g 5    albuterol (PROVENTIL) 2.5 mg /3 mL (0.083 %) nebulizer solution Take 3 mLs (2.5 mg total) by nebulization every 4 (four) hours as needed for Wheezing or Shortness of Breath. Rescue 1 Box 5    aspirin (ECOTRIN) 81 MG EC tablet Take 81 mg by mouth once daily.      atorvastatin (LIPITOR) 10 MG tablet TAKE 1 TABLET(10 MG) BY MOUTH EVERY DAY 90 tablet 1    calcium-vitamin D (CALCIUM 600 + D,3,) 600  "mg(1,500mg) -400 unit Tab Take by mouth.      cetirizine (ZYRTEC) 10 MG tablet Take 10 mg by mouth once daily.      fluticasone (FLONASE) 50 mcg/actuation nasal spray 2 sprays by Each Nare route daily as needed. 2 Spray, Suspension Nasal Every day      metFORMIN (GLUCOPHAGE-XR) 500 MG 24 hr tablet TAKE 2 TABLETS(1000 MG) BY MOUTH EVERY DAY WITH DINNER 180 tablet 1    albuterol (VENTOLIN HFA) 90 mcg/actuation inhaler Inhale 2 puffs into the lungs every 6 (six) hours as needed for Wheezing. Rescue 18 g 0    azithromycin (Z-BILLIE) 250 MG tablet Take 2 tablets by mouth on day 1; Take 1 tablet by mouth on days 2-5 6 tablet 0    predniSONE (DELTASONE) 20 MG tablet Take 1 tablet (20 mg total) by mouth 2 (two) times daily for 3 days, THEN 1 tablet (20 mg total) once daily for 4 days. 10 tablet 0     No current facility-administered medications for this visit.          ROS  Review of Systems   Constitutional: Positive for chills and fatigue. Negative for fever.   HENT: Positive for congestion and sore throat. Negative for ear pain, postnasal drip and sinus pain.    Respiratory: Positive for cough, shortness of breath and wheezing.    Cardiovascular: Negative for chest pain.   Gastrointestinal: Negative for diarrhea, nausea and vomiting.   Psychiatric/Behavioral: Positive for sleep disturbance.           PHYSICAL EXAM  Vitals:    01/29/20 1458   BP: 110/74   BP Location: Right arm   Patient Position: Sitting   BP Method: Large (Automatic)   Pulse: 64   Resp: 18   Temp: 99.1 °F (37.3 °C)   TempSrc: Oral   SpO2: 99%   Weight: 107.7 kg (237 lb 7 oz)   Height: 5' 3" (1.6 m)    Body mass index is 42.06 kg/m².  Weight: 107.7 kg (237 lb 7 oz)   Height: 5' 3" (160 cm)     Physical Exam   Constitutional: She is oriented to person, place, and time. She appears well-developed and well-nourished. She appears ill.   HENT:   Head: Normocephalic.   Right Ear: Tympanic membrane normal.   Left Ear: Tympanic membrane normal. "   Mouth/Throat: Uvula is midline, oropharynx is clear and moist and mucous membranes are normal.   Eyes: Conjunctivae are normal.   Cardiovascular: Normal rate, regular rhythm, normal heart sounds and normal pulses.   No murmur heard.  Pulses:       Radial pulses are 2+ on the right side, and 2+ on the left side.   No LE swelling noted   Pulmonary/Chest: Effort normal. She has wheezes. She has rhonchi.   Abdominal: Soft. Bowel sounds are normal. There is no tenderness.   Musculoskeletal: She exhibits no edema.   Lymphadenopathy:     She has no cervical adenopathy.   Neurological: She is alert and oriented to person, place, and time.   Skin: Skin is warm and dry. No rash noted.   Psychiatric: She has a normal mood and affect.         Health Maintenance       Date Due Completion Date    HIV Screening 01/26/1981 ---    Shingles Vaccine (1 of 2) 01/26/2016 ---    Hemoglobin A1c 03/09/2020 9/9/2019    Override on 6/28/2017: Done (A1C  6.1   report sent to scanning   kb)    Override on 11/30/2016: Done (A1C 6.8 report sent to scanning  kb)    Override on 5/16/2016: Done (A1C 7.1   report sent to scanning  kb)    Override on 2/26/2016: Done (A1C  7.7   report sent to scanning   kb)    Lipid Panel 09/09/2020 9/9/2019    Override on 6/28/2017: Done (Total chol 124   report sent to scanning   kb)    Override on 11/30/2016: Done (Dr Thomas Gill   labs sent to scanning  kb)    Urine Microalbumin 09/09/2020 9/9/2019    Foot Exam 09/10/2020 9/10/2019    Eye Exam 09/11/2020 9/11/2019    Override on 5/6/2018: Done (Dr. Schoenberger)    Low Dose Statin 01/29/2021 1/29/2020    Pap Smear with HPV Cotest 08/13/2021 8/13/2018    Mammogram 09/09/2021 9/9/2019    Colonoscopy 08/06/2026 8/6/2016 (Done)    Override on 8/6/2016: Done (Dr. Ware (normal))    TETANUS VACCINE 03/15/2029 3/15/2019            Assessment & Plan    Problem List Items Addressed This Visit     None      Visit Diagnoses     Bronchitis    -  Primary    Relevant  Medications    albuterol (VENTOLIN HFA) 90 mcg/actuation inhaler    predniSONE (DELTASONE) 20 MG tablet    azithromycin (Z-BILLIE) 250 MG tablet          Follow-up: Follow up in about 3 months (around 4/29/2020) for follow up with Dr. Gill.    Bonnie MELISSA Taveras    Medication List with Changes/Refills   New Medications    ALBUTEROL (VENTOLIN HFA) 90 MCG/ACTUATION INHALER    Inhale 2 puffs into the lungs every 6 (six) hours as needed for Wheezing. Rescue    AZITHROMYCIN (Z-BILLIE) 250 MG TABLET    Take 2 tablets by mouth on day 1; Take 1 tablet by mouth on days 2-5    PREDNISONE (DELTASONE) 20 MG TABLET    Take 1 tablet (20 mg total) by mouth 2 (two) times daily for 3 days, THEN 1 tablet (20 mg total) once daily for 4 days.   Current Medications    ALBUTEROL (PROAIR HFA) 90 MCG/ACTUATION INHALER    Inhale 2 puffs into the lungs every 4 (four) hours as needed for Wheezing or Shortness of Breath. Rescue    ALBUTEROL (PROVENTIL) 2.5 MG /3 ML (0.083 %) NEBULIZER SOLUTION    Take 3 mLs (2.5 mg total) by nebulization every 4 (four) hours as needed for Wheezing or Shortness of Breath. Rescue    ASPIRIN (ECOTRIN) 81 MG EC TABLET    Take 81 mg by mouth once daily.    ATORVASTATIN (LIPITOR) 10 MG TABLET    TAKE 1 TABLET(10 MG) BY MOUTH EVERY DAY    CALCIUM-VITAMIN D (CALCIUM 600 + D,3,) 600 MG(1,500MG) -400 UNIT TAB    Take by mouth.    CETIRIZINE (ZYRTEC) 10 MG TABLET    Take 10 mg by mouth once daily.    FLUTICASONE (FLONASE) 50 MCG/ACTUATION NASAL SPRAY    2 sprays by Each Nare route daily as needed. 2 Spray, Suspension Nasal Every day    METFORMIN (GLUCOPHAGE-XR) 500 MG 24 HR TABLET    TAKE 2 TABLETS(1000 MG) BY MOUTH EVERY DAY WITH DINNER   Discontinued Medications    MELOXICAM (MOBIC) 15 MG TABLET    Take 1 tablet by mouth every evening.    OSPEMIFENE (OSPHENA) 60 MG TAB    TAKE 1 TABLET(60MG) BY MOUTH ONCE DAILY

## 2020-02-03 DIAGNOSIS — J45.909 CHRONIC ASTHMA, UNSPECIFIED ASTHMA SEVERITY, UNSPECIFIED WHETHER COMPLICATED, UNSPECIFIED WHETHER PERSISTENT: Chronic | ICD-10-CM

## 2020-02-03 RX ORDER — ALBUTEROL SULFATE 0.83 MG/ML
2.5 SOLUTION RESPIRATORY (INHALATION) EVERY 4 HOURS PRN
Qty: 1 BOX | Refills: 5 | Status: SHIPPED | OUTPATIENT
Start: 2020-02-03 | End: 2021-09-08 | Stop reason: SDUPTHER

## 2020-02-23 ENCOUNTER — PATIENT OUTREACH (OUTPATIENT)
Dept: ADMINISTRATIVE | Facility: HOSPITAL | Age: 55
End: 2020-02-23

## 2020-03-05 DIAGNOSIS — E11.69 TYPE 2 DIABETES MELLITUS WITH HYPERLIPIDEMIA: ICD-10-CM

## 2020-03-05 DIAGNOSIS — E78.5 TYPE 2 DIABETES MELLITUS WITH HYPERLIPIDEMIA: ICD-10-CM

## 2020-03-05 RX ORDER — METFORMIN HYDROCHLORIDE 500 MG/1
TABLET, EXTENDED RELEASE ORAL
Qty: 180 TABLET | Refills: 1 | Status: SHIPPED | OUTPATIENT
Start: 2020-03-05 | End: 2020-03-13 | Stop reason: SDUPTHER

## 2020-03-09 ENCOUNTER — CLINICAL SUPPORT (OUTPATIENT)
Dept: PRIMARY CARE CLINIC | Facility: CLINIC | Age: 55
End: 2020-03-09
Payer: OTHER GOVERNMENT

## 2020-03-09 DIAGNOSIS — E78.5 HYPERLIPIDEMIA, UNSPECIFIED HYPERLIPIDEMIA TYPE: ICD-10-CM

## 2020-03-09 DIAGNOSIS — E11.69 TYPE 2 DIABETES MELLITUS WITH HYPERLIPIDEMIA: ICD-10-CM

## 2020-03-09 DIAGNOSIS — E78.5 TYPE 2 DIABETES MELLITUS WITH HYPERLIPIDEMIA: ICD-10-CM

## 2020-03-09 LAB
ALBUMIN SERPL BCP-MCNC: 3.5 G/DL (ref 3.5–5.2)
ALP SERPL-CCNC: 86 U/L (ref 38–126)
ALT SERPL W/O P-5'-P-CCNC: 26 U/L (ref 14–54)
ANION GAP SERPL CALC-SCNC: 8 MMOL/L (ref 8–16)
AST SERPL-CCNC: 32 U/L (ref 15–41)
BILIRUB SERPL-MCNC: 0.7 MG/DL (ref 0.3–1.2)
BUN SERPL-MCNC: 18 MG/DL (ref 6–20)
CALCIUM SERPL-MCNC: 9 MG/DL (ref 8.6–10)
CHLORIDE SERPL-SCNC: 106 MMOL/L (ref 101–111)
CHOLEST SERPL-MCNC: 135 MG/DL (ref 80–200)
CHOLEST/HDLC SERPL: 2.1 {RATIO} (ref 2–5)
CO2 SERPL-SCNC: 28 MMOL/L (ref 23–29)
CREAT SERPL-MCNC: 0.8 MG/DL (ref 0.5–1.4)
EST. GFR  (AFRICAN AMERICAN): >60 ML/MIN/1.73 M^2
EST. GFR  (NON AFRICAN AMERICAN): >60 ML/MIN/1.73 M^2
ESTIMATED AVG GLUCOSE: 146 MG/DL (ref 68–131)
GLUCOSE SERPL-MCNC: 114 MG/DL (ref 74–118)
HBA1C MFR BLD HPLC: 6.7 % (ref 4–5.6)
HDLC SERPL-MCNC: 63 MG/DL (ref 40–75)
HDLC SERPL: 46.7 % (ref 20–50)
LDLC SERPL CALC-MCNC: 58 MG/DL
NONHDLC SERPL-MCNC: 72 MG/DL
POTASSIUM SERPL-SCNC: 4 MMOL/L (ref 3.5–5.1)
PROT SERPL-MCNC: 7 G/DL (ref 6–8.4)
SODIUM SERPL-SCNC: 142 MMOL/L (ref 136–145)
TRIGL SERPL-MCNC: 69 MG/DL (ref 30–150)

## 2020-03-09 PROCEDURE — 83036 HEMOGLOBIN GLYCOSYLATED A1C: CPT

## 2020-03-09 PROCEDURE — 80061 LIPID PANEL: CPT

## 2020-03-09 PROCEDURE — 36415 PR COLLECTION VENOUS BLOOD,VENIPUNCTURE: ICD-10-PCS | Mod: S$GLB,,, | Performed by: FAMILY MEDICINE

## 2020-03-09 PROCEDURE — 80053 COMPREHEN METABOLIC PANEL: CPT

## 2020-03-09 PROCEDURE — 36415 COLL VENOUS BLD VENIPUNCTURE: CPT | Mod: S$GLB,,, | Performed by: FAMILY MEDICINE

## 2020-03-13 ENCOUNTER — OFFICE VISIT (OUTPATIENT)
Dept: PRIMARY CARE CLINIC | Facility: CLINIC | Age: 55
End: 2020-03-13
Payer: OTHER GOVERNMENT

## 2020-03-13 VITALS
RESPIRATION RATE: 17 BRPM | SYSTOLIC BLOOD PRESSURE: 122 MMHG | TEMPERATURE: 98 F | HEIGHT: 63 IN | OXYGEN SATURATION: 99 % | DIASTOLIC BLOOD PRESSURE: 84 MMHG | WEIGHT: 237.44 LBS | BODY MASS INDEX: 42.07 KG/M2 | HEART RATE: 78 BPM

## 2020-03-13 DIAGNOSIS — E78.5 HYPERLIPIDEMIA, UNSPECIFIED HYPERLIPIDEMIA TYPE: ICD-10-CM

## 2020-03-13 DIAGNOSIS — E11.69 TYPE 2 DIABETES MELLITUS WITH HYPERLIPIDEMIA: Primary | ICD-10-CM

## 2020-03-13 DIAGNOSIS — E66.01 MORBID OBESITY WITH BMI OF 40.0-44.9, ADULT: ICD-10-CM

## 2020-03-13 DIAGNOSIS — E78.5 TYPE 2 DIABETES MELLITUS WITH HYPERLIPIDEMIA: Primary | ICD-10-CM

## 2020-03-13 DIAGNOSIS — Z11.4 SCREENING FOR HIV (HUMAN IMMUNODEFICIENCY VIRUS): ICD-10-CM

## 2020-03-13 DIAGNOSIS — J45.20 MILD INTERMITTENT CHRONIC ASTHMA WITHOUT COMPLICATION: Chronic | ICD-10-CM

## 2020-03-13 PROCEDURE — 99214 PR OFFICE/OUTPT VISIT, EST, LEVL IV, 30-39 MIN: ICD-10-PCS | Mod: S$GLB,,, | Performed by: FAMILY MEDICINE

## 2020-03-13 PROCEDURE — 2024F PR 7 FIELD PHOTOS WITH INTERP/ REVIEW: ICD-10-PCS | Mod: S$GLB,,, | Performed by: FAMILY MEDICINE

## 2020-03-13 PROCEDURE — 99999 PR PBB SHADOW E&M-EST. PATIENT-LVL III: CPT | Mod: PBBFAC,,, | Performed by: FAMILY MEDICINE

## 2020-03-13 PROCEDURE — 2024F 7 FLD RTA PHOTO EVC RTNOPTHY: CPT | Mod: S$GLB,,, | Performed by: FAMILY MEDICINE

## 2020-03-13 PROCEDURE — 99214 OFFICE O/P EST MOD 30 MIN: CPT | Mod: S$GLB,,, | Performed by: FAMILY MEDICINE

## 2020-03-13 PROCEDURE — 99999 PR PBB SHADOW E&M-EST. PATIENT-LVL III: ICD-10-PCS | Mod: PBBFAC,,, | Performed by: FAMILY MEDICINE

## 2020-03-13 RX ORDER — METFORMIN HYDROCHLORIDE 500 MG/1
TABLET, EXTENDED RELEASE ORAL
Qty: 180 TABLET | Refills: 1 | Status: SHIPPED | OUTPATIENT
Start: 2020-03-13 | End: 2020-09-11 | Stop reason: SDUPTHER

## 2020-03-13 RX ORDER — ATORVASTATIN CALCIUM 10 MG/1
TABLET, FILM COATED ORAL
Qty: 90 TABLET | Refills: 1 | Status: SHIPPED | OUTPATIENT
Start: 2020-03-13 | End: 2020-09-08

## 2020-03-13 NOTE — PROGRESS NOTES
"Subjective:       Patient ID: Rosina Koenig is a 55 y.o. female.    Chief Complaint: Results (review lab work) and Medication Refill (all meds)    Diabetes - A1C up slightly to 6.7, had been eating worse over holidays, better now  HLD - stable  Asthma - occasional cough, no recent exacerbations    Review of Systems   Constitutional: Negative for chills, fatigue and fever.   HENT: Negative for congestion.    Eyes: Negative for visual disturbance.   Respiratory: Negative for cough and shortness of breath.    Cardiovascular: Negative for chest pain.   Gastrointestinal: Negative for abdominal pain, nausea and vomiting.   Genitourinary: Negative for difficulty urinating.   Musculoskeletal: Negative for arthralgias.   Skin: Negative for rash.   Neurological: Negative for dizziness.   Psychiatric/Behavioral: Negative for sleep disturbance.       Objective:      Vitals:    03/13/20 0801   BP: 122/84   BP Location: Right arm   Patient Position: Sitting   BP Method: Large (Manual)   Pulse: 78   Resp: 17   Temp: 98.2 °F (36.8 °C)   TempSrc: Oral   SpO2: 99%   Weight: 107.7 kg (237 lb 7 oz)   Height: 5' 3" (1.6 m)     Physical Exam   Constitutional: She is oriented to person, place, and time. She appears well-developed and well-nourished.   HENT:   Head: Normocephalic and atraumatic.   Cardiovascular: Normal rate, regular rhythm and normal heart sounds.   Pulmonary/Chest: Effort normal and breath sounds normal.   Musculoskeletal: She exhibits no edema.   Neurological: She is alert and oriented to person, place, and time.   Skin: Skin is warm and dry.   Nursing note and vitals reviewed.      Lab Results   Component Value Date    WBC 8.20 02/06/2019    HGB 13.2 02/06/2019    HCT 41.1 02/06/2019     02/06/2019    CHOL 135 03/09/2020    TRIG 69 03/09/2020    HDL 63 03/09/2020    ALT 26 03/09/2020    AST 32 03/09/2020     03/09/2020    K 4.0 03/09/2020     03/09/2020    CREATININE 0.8 03/09/2020    BUN 18 " 03/09/2020    CO2 28 03/09/2020    TSH 1.75 02/06/2019    HGBA1C 6.7 (H) 03/09/2020      Assessment:       1. Type 2 diabetes mellitus with hyperlipidemia    2. Hyperlipidemia, unspecified hyperlipidemia type    3. Morbid obesity with BMI of 40.0-44.9, adult    4. Mild intermittent chronic asthma without complication    5. Screening for HIV (human immunodeficiency virus)        Plan:       Type 2 diabetes mellitus with hyperlipidemia  -     metFORMIN (GLUCOPHAGE-XR) 500 MG XR 24hr tablet; TAKE 2 TABLETS BY MOUTH EVERY DAY WITH DINNER  Dispense: 180 tablet; Refill: 1  -     Comprehensive metabolic panel; Future; Expected date: 09/13/2020  -     Hemoglobin A1c; Future; Expected date: 09/13/2020  -     Microalbumin/creatinine urine ratio; Future; Expected date: 09/13/2020  Well controlled  Hyperlipidemia, unspecified hyperlipidemia type  -     atorvastatin (LIPITOR) 10 MG tablet; TAKE 1 TABLET(10 MG) BY MOUTH EVERY DAY  Dispense: 90 tablet; Refill: 1  -     Comprehensive metabolic panel; Future; Expected date: 09/13/2020  -     Lipid panel; Future; Expected date: 09/13/2020  stable  Morbid obesity with BMI of 40.0-44.9, adult    Mild intermittent chronic asthma without complication  stable  Screening for HIV (human immunodeficiency virus)  -     HIV 1/2 Ag/Ab (4th Gen); Future; Expected date: 09/13/2020      Medication List with Changes/Refills   Current Medications    ALBUTEROL (PROAIR HFA) 90 MCG/ACTUATION INHALER    Inhale 2 puffs into the lungs every 4 (four) hours as needed for Wheezing or Shortness of Breath. Rescue    ALBUTEROL (PROVENTIL) 2.5 MG /3 ML (0.083 %) NEBULIZER SOLUTION    Take 3 mLs (2.5 mg total) by nebulization every 4 (four) hours as needed for Wheezing or Shortness of Breath. Rescue    ASPIRIN (ECOTRIN) 81 MG EC TABLET    Take 81 mg by mouth once daily.    CALCIUM-VITAMIN D (CALCIUM 600 + D,3,) 600 MG(1,500MG) -400 UNIT TAB    Take by mouth.    CETIRIZINE (ZYRTEC) 10 MG TABLET    Take 10 mg by  mouth once daily.    FLUTICASONE (FLONASE) 50 MCG/ACTUATION NASAL SPRAY    2 sprays by Each Nare route daily as needed. 2 Spray, Suspension Nasal Every day   Changed and/or Refilled Medications    Modified Medication Previous Medication    ATORVASTATIN (LIPITOR) 10 MG TABLET atorvastatin (LIPITOR) 10 MG tablet       TAKE 1 TABLET(10 MG) BY MOUTH EVERY DAY    TAKE 1 TABLET(10 MG) BY MOUTH EVERY DAY    METFORMIN (GLUCOPHAGE-XR) 500 MG XR 24HR TABLET metFORMIN (GLUCOPHAGE-XR) 500 MG XR 24hr tablet       TAKE 2 TABLETS BY MOUTH EVERY DAY WITH DINNER    TAKE 2 TABLETS BY MOUTH EVERY DAY WITH DINNER   Discontinued Medications    ALBUTEROL (VENTOLIN HFA) 90 MCG/ACTUATION INHALER    Inhale 2 puffs into the lungs every 6 (six) hours as needed for Wheezing. Rescue    AZITHROMYCIN (Z-BILLIE) 250 MG TABLET    Take 2 tablets by mouth on day 1; Take 1 tablet by mouth on days 2-5

## 2020-07-24 ENCOUNTER — HOSPITAL ENCOUNTER (OUTPATIENT)
Dept: RADIOLOGY | Facility: HOSPITAL | Age: 55
Discharge: HOME | End: 2020-07-24
Attending: FAMILY MEDICINE

## 2020-07-24 DIAGNOSIS — Z12.31 ENCOUNTER FOR SCREENING MAMMOGRAM FOR BREAST CANCER: ICD-10-CM

## 2020-07-24 PROCEDURE — 77063 MAMMO DIGITAL SCREENING BILAT WITH TOMOSYNTHESIS_CAD: ICD-10-PCS | Mod: 26,,, | Performed by: RADIOLOGY

## 2020-07-24 PROCEDURE — 77067 SCR MAMMO BI INCL CAD: CPT | Mod: 26,,, | Performed by: RADIOLOGY

## 2020-07-24 PROCEDURE — 77063 BREAST TOMOSYNTHESIS BI: CPT | Mod: 26,,, | Performed by: RADIOLOGY

## 2020-07-24 PROCEDURE — 77067 MAMMO DIGITAL SCREENING BILAT WITH TOMOSYNTHESIS_CAD: ICD-10-PCS | Mod: 26,,, | Performed by: RADIOLOGY

## 2020-07-24 PROCEDURE — 77067 SCR MAMMO BI INCL CAD: CPT | Mod: TC,PO

## 2020-09-08 ENCOUNTER — TELEPHONE (OUTPATIENT)
Dept: PRIMARY CARE CLINIC | Facility: CLINIC | Age: 55
End: 2020-09-08

## 2020-09-08 NOTE — TELEPHONE ENCOUNTER
Patient notified that her labs are ordered and she can walk in to [facility] to have them done. She states understanding

## 2020-09-08 NOTE — TELEPHONE ENCOUNTER
----- Message from Kayy Geller sent at 9/8/2020  9:24 AM CDT -----  Contact: patient [phone]  Patient called to confirm that her appt for 9/11 is not necessary since you didn't schedule her labs which she always has every 6 months. Please call patient to advise if you can schedule labs before her appt.

## 2020-09-11 ENCOUNTER — OFFICE VISIT (OUTPATIENT)
Dept: PRIMARY CARE CLINIC | Facility: CLINIC | Age: 55
End: 2020-09-11
Payer: OTHER GOVERNMENT

## 2020-09-11 VITALS
BODY MASS INDEX: 43.36 KG/M2 | RESPIRATION RATE: 18 BRPM | HEART RATE: 60 BPM | HEIGHT: 63 IN | DIASTOLIC BLOOD PRESSURE: 82 MMHG | WEIGHT: 244.69 LBS | TEMPERATURE: 99 F | OXYGEN SATURATION: 98 % | SYSTOLIC BLOOD PRESSURE: 126 MMHG

## 2020-09-11 DIAGNOSIS — Z23 NEED FOR IMMUNIZATION AGAINST INFLUENZA: ICD-10-CM

## 2020-09-11 DIAGNOSIS — E11.69 TYPE 2 DIABETES MELLITUS WITH HYPERLIPIDEMIA: Primary | ICD-10-CM

## 2020-09-11 DIAGNOSIS — Z11.59 NEED FOR HEPATITIS C SCREENING TEST: ICD-10-CM

## 2020-09-11 DIAGNOSIS — E78.5 TYPE 2 DIABETES MELLITUS WITH HYPERLIPIDEMIA: Primary | ICD-10-CM

## 2020-09-11 DIAGNOSIS — E78.5 HYPERLIPIDEMIA, UNSPECIFIED HYPERLIPIDEMIA TYPE: ICD-10-CM

## 2020-09-11 PROCEDURE — 90471 IMMUNIZATION ADMIN: CPT | Mod: S$GLB,,, | Performed by: FAMILY MEDICINE

## 2020-09-11 PROCEDURE — 90686 FLU VACCINE (QUAD) GREATER THAN OR EQUAL TO 3YO PRESERVATIVE FREE IM: ICD-10-PCS | Mod: S$GLB,,, | Performed by: FAMILY MEDICINE

## 2020-09-11 PROCEDURE — 99213 PR OFFICE/OUTPT VISIT, EST, LEVL III, 20-29 MIN: ICD-10-PCS | Mod: 25,S$GLB,, | Performed by: FAMILY MEDICINE

## 2020-09-11 PROCEDURE — 99999 PR PBB SHADOW E&M-EST. PATIENT-LVL IV: ICD-10-PCS | Mod: PBBFAC,,, | Performed by: FAMILY MEDICINE

## 2020-09-11 PROCEDURE — 99999 PR PBB SHADOW E&M-EST. PATIENT-LVL IV: CPT | Mod: PBBFAC,,, | Performed by: FAMILY MEDICINE

## 2020-09-11 PROCEDURE — 90686 IIV4 VACC NO PRSV 0.5 ML IM: CPT | Mod: S$GLB,,, | Performed by: FAMILY MEDICINE

## 2020-09-11 PROCEDURE — 99213 OFFICE O/P EST LOW 20 MIN: CPT | Mod: 25,S$GLB,, | Performed by: FAMILY MEDICINE

## 2020-09-11 PROCEDURE — 90471 FLU VACCINE (QUAD) GREATER THAN OR EQUAL TO 3YO PRESERVATIVE FREE IM: ICD-10-PCS | Mod: S$GLB,,, | Performed by: FAMILY MEDICINE

## 2020-09-11 RX ORDER — ATORVASTATIN CALCIUM 10 MG/1
TABLET, FILM COATED ORAL
Qty: 90 TABLET | Refills: 1 | Status: SHIPPED | OUTPATIENT
Start: 2020-09-11 | End: 2021-09-08 | Stop reason: SDUPTHER

## 2020-09-11 RX ORDER — METFORMIN HYDROCHLORIDE 500 MG/1
TABLET, EXTENDED RELEASE ORAL
Qty: 180 TABLET | Refills: 1 | Status: SHIPPED | OUTPATIENT
Start: 2020-09-11 | End: 2021-09-08 | Stop reason: SDUPTHER

## 2020-09-11 NOTE — PROGRESS NOTES
Patient identified by name and date of birth, allergies reviewed, immunization administered by aseptic technique, tolerated well by pt.

## 2020-09-11 NOTE — PROGRESS NOTES
"Subjective:       Patient ID: Rosina Koenig is a 55 y.o. female.    Chief Complaint: Diabetes    A1C and TG's up slightly, says she has been eating worse during COVID pandemic/quarantine. Generally feeling OK, no particular complaints or concerns    Review of Systems   Constitutional: Negative for fatigue.   HENT: Negative for trouble swallowing.    Respiratory: Negative for shortness of breath.    Cardiovascular: Negative for chest pain.   Gastrointestinal: Negative for nausea and vomiting.   Endocrine: Positive for polyuria. Negative for polydipsia and polyphagia.   Skin: Negative for pallor.   Neurological: Negative for dizziness, tremors, seizures, speech difficulty, weakness and headaches.   Hematological: Does not bruise/bleed easily.   Psychiatric/Behavioral: Negative for confusion. The patient is not nervous/anxious.        Objective:      Vitals:    09/11/20 0804   BP: 126/82   BP Location: Left arm   Patient Position: Sitting   BP Method: Large (Manual)   Pulse: 60   Resp: 18   Temp: 98.6 °F (37 °C)   TempSrc: Oral   SpO2: 98%   Weight: 111 kg (244 lb 11.4 oz)   Height: 5' 3" (1.6 m)     Physical Exam  Vitals signs and nursing note reviewed.   Constitutional:       Appearance: She is well-developed.   HENT:      Head: Normocephalic and atraumatic.   Cardiovascular:      Rate and Rhythm: Normal rate and regular rhythm.      Pulses:           Dorsalis pedis pulses are 1+ on the right side and 1+ on the left side.      Heart sounds: Normal heart sounds.   Pulmonary:      Effort: Pulmonary effort is normal.      Breath sounds: Normal breath sounds.   Feet:      Right foot:      Protective Sensation: 10 sites tested. 10 sites sensed.      Skin integrity: Skin integrity normal.      Left foot:      Protective Sensation: 10 sites tested. 10 sites sensed.      Skin integrity: Skin integrity normal.   Skin:     General: Skin is warm and dry.   Neurological:      Mental Status: She is alert and oriented to " person, place, and time.         Lab Results   Component Value Date    WBC 8.20 02/06/2019    HGB 13.2 02/06/2019    HCT 41.1 02/06/2019     02/06/2019    CHOL 131 09/09/2020    TRIG 154 (H) 09/09/2020    HDL 50 09/09/2020    ALT 16 09/09/2020    AST 22 09/09/2020     09/09/2020    K 3.9 09/09/2020     09/09/2020    CREATININE 0.9 09/09/2020    BUN 13 09/09/2020    CO2 24 09/09/2020    TSH 1.75 02/06/2019    HGBA1C 6.8 (H) 09/09/2020      Assessment:       1. Type 2 diabetes mellitus with hyperlipidemia    2. Need for immunization against influenza    3. Hyperlipidemia, unspecified hyperlipidemia type    4. Need for hepatitis C screening test        Plan:       Type 2 diabetes mellitus with hyperlipidemia  -     Foot Exam Performed  -     metFORMIN (GLUCOPHAGE-XR) 500 MG ER 24hr tablet; TAKE 2 TABLETS BY MOUTH EVERY DAY WITH DINNER  Dispense: 180 tablet; Refill: 1  -     Comprehensive metabolic panel; Future; Expected date: 03/11/2021  -     Hemoglobin A1C; Future; Expected date: 03/11/2021  Continue current meds, stressed dietary modification  Need for immunization against influenza  -     Influenza - Quadrivalent *Preferred* (6 months+) (PF)    Hyperlipidemia, unspecified hyperlipidemia type  -     atorvastatin (LIPITOR) 10 MG tablet; TAKE 1 TABLET BY MOUTH EVERY DAY  Dispense: 90 tablet; Refill: 1  -     Comprehensive metabolic panel; Future; Expected date: 03/11/2021  -     Lipid Panel; Future; Expected date: 03/11/2021  Continue low dose statin  Need for hepatitis C screening test  -     Hepatitis C Antibody; Future; Expected date: 03/11/2021      Medication List with Changes/Refills   Current Medications    ALBUTEROL (PROAIR HFA) 90 MCG/ACTUATION INHALER    Inhale 2 puffs into the lungs every 4 (four) hours as needed for Wheezing or Shortness of Breath. Rescue    ALBUTEROL (PROVENTIL) 2.5 MG /3 ML (0.083 %) NEBULIZER SOLUTION    Take 3 mLs (2.5 mg total) by nebulization every 4 (four) hours  as needed for Wheezing or Shortness of Breath. Rescue    ASPIRIN (ECOTRIN) 81 MG EC TABLET    Take 81 mg by mouth once daily.    CALCIUM-VITAMIN D (CALCIUM 600 + D,3,) 600 MG(1,500MG) -400 UNIT TAB    Take by mouth.    CETIRIZINE (ZYRTEC) 10 MG TABLET    Take 10 mg by mouth once daily.    FLUTICASONE (FLONASE) 50 MCG/ACTUATION NASAL SPRAY    2 sprays by Each Nare route daily as needed. 2 Spray, Suspension Nasal Every day   Changed and/or Refilled Medications    Modified Medication Previous Medication    ATORVASTATIN (LIPITOR) 10 MG TABLET atorvastatin (LIPITOR) 10 MG tablet       TAKE 1 TABLET BY MOUTH EVERY DAY    TAKE 1 TABLET BY MOUTH EVERY DAY    METFORMIN (GLUCOPHAGE-XR) 500 MG ER 24HR TABLET metFORMIN (GLUCOPHAGE-XR) 500 MG XR 24hr tablet       TAKE 2 TABLETS BY MOUTH EVERY DAY WITH DINNER    TAKE 2 TABLETS BY MOUTH EVERY DAY WITH DINNER

## 2020-10-05 ENCOUNTER — PATIENT MESSAGE (OUTPATIENT)
Dept: ADMINISTRATIVE | Facility: HOSPITAL | Age: 55
End: 2020-10-05

## 2020-11-19 ENCOUNTER — NURSE TRIAGE (OUTPATIENT)
Dept: ADMINISTRATIVE | Facility: CLINIC | Age: 55
End: 2020-11-19

## 2020-11-20 NOTE — TELEPHONE ENCOUNTER
Son came home Saturday with sore throat and once symptoms increased he went to get tested and son's covid results just came back positive. Rosina ORTIZ has no symptoms. Discussed care advice as documented.    Reason for Disposition   [1] COVID-19 EXPOSURE (Close Contact) AND [2] within last 14 days BUT [2] NO symptoms    Additional Information   Negative: COVID-19 has been diagnosed by a healthcare provider (HCP)   Negative: COVID-19 lab test positive   Negative: [1] Symptoms of COVID-19 (e.g., cough, fever, SOB, or others) AND [2] lives in an area with community spread   Negative: [1] Symptoms of COVID-19 (e.g., cough, fever, SOB, or others) AND [2] within 14 days of EXPOSURE (close contact) with diagnosed or suspected COVID-19 patient   Negative: [1] Symptoms of COVID-19 (e.g., cough, fever, SOB, or others) AND [2] within 14 days of travel from high-risk area for COVID-19 community spread (identified by CDC)   Negative: [1] Difficulty breathing (shortness of breath) occurs AND [2] onset > 14 days after COVID-19 EXPOSURE (Close Contact) AND [3] no community spread   Negative: [1] Cough occurs AND [2] onset > 14 days after COVID-19 EXPOSURE AND [3] no community spread   Negative: [1] Common cold symptoms AND [2] onset > 14 days after COVID-19 EXPOSURE AND [3] no community spread   Negative: [1] COVID-19 EXPOSURE (Close Contact) within last 14 days AND [2] needs COVID-19 lab test to return to work AND [3] NO symptoms   Negative: [1] COVID-19 EXPOSURE (Close Contact) within last 14 days AND [2] exposed person is a healthcare worker who was NOT using all recommended personal protective equipment (i.e., a respirator-N95 mask, eye protection, gloves, and gown) AND [3] NO symptoms    Protocols used: CORONAVIRUS (COVID-19) EXPOSURE-A-[place]

## 2020-11-20 NOTE — TELEPHONE ENCOUNTER
Pt states on-call nurse stated that as long as patient was fever free 48 hours with improvement of symptoms by Dec 11th pt can be out of quarantine, but herself and her  have to be quarantined for 14 days. Is this accurate?

## 2021-01-04 ENCOUNTER — PATIENT MESSAGE (OUTPATIENT)
Dept: ADMINISTRATIVE | Facility: HOSPITAL | Age: 56
End: 2021-01-04

## 2021-02-24 DIAGNOSIS — E11.9 TYPE 2 DIABETES MELLITUS WITHOUT COMPLICATION, UNSPECIFIED WHETHER LONG TERM INSULIN USE: ICD-10-CM

## 2021-03-31 LAB
LEFT EYE DM RETINOPATHY: NEGATIVE
RIGHT EYE DM RETINOPATHY: NEGATIVE

## 2021-04-06 ENCOUNTER — OFFICE VISIT (OUTPATIENT)
Dept: PRIMARY CARE CLINIC | Facility: CLINIC | Age: 56
End: 2021-04-06
Payer: OTHER GOVERNMENT

## 2021-04-06 VITALS
HEART RATE: 76 BPM | WEIGHT: 235.88 LBS | HEIGHT: 63 IN | RESPIRATION RATE: 18 BRPM | SYSTOLIC BLOOD PRESSURE: 124 MMHG | OXYGEN SATURATION: 96 % | DIASTOLIC BLOOD PRESSURE: 72 MMHG | BODY MASS INDEX: 41.79 KG/M2

## 2021-04-06 DIAGNOSIS — Z12.4 CERVICAL CANCER SCREENING: ICD-10-CM

## 2021-04-06 DIAGNOSIS — E11.69 TYPE 2 DIABETES MELLITUS WITH HYPERLIPIDEMIA: Primary | ICD-10-CM

## 2021-04-06 DIAGNOSIS — E66.01 MORBID OBESITY WITH BMI OF 40.0-44.9, ADULT: ICD-10-CM

## 2021-04-06 DIAGNOSIS — E78.5 HYPERLIPIDEMIA, UNSPECIFIED HYPERLIPIDEMIA TYPE: ICD-10-CM

## 2021-04-06 DIAGNOSIS — E78.5 TYPE 2 DIABETES MELLITUS WITH HYPERLIPIDEMIA: Primary | ICD-10-CM

## 2021-04-06 PROCEDURE — 99999 PR PBB SHADOW E&M-EST. PATIENT-LVL IV: ICD-10-PCS | Mod: PBBFAC,,, | Performed by: FAMILY MEDICINE

## 2021-04-06 PROCEDURE — 99999 PR PBB SHADOW E&M-EST. PATIENT-LVL IV: CPT | Mod: PBBFAC,,, | Performed by: FAMILY MEDICINE

## 2021-04-06 PROCEDURE — 99213 OFFICE O/P EST LOW 20 MIN: CPT | Mod: S$GLB,,, | Performed by: FAMILY MEDICINE

## 2021-04-06 PROCEDURE — 99213 PR OFFICE/OUTPT VISIT, EST, LEVL III, 20-29 MIN: ICD-10-PCS | Mod: S$GLB,,, | Performed by: FAMILY MEDICINE

## 2021-04-07 ENCOUNTER — PATIENT OUTREACH (OUTPATIENT)
Dept: ADMINISTRATIVE | Facility: HOSPITAL | Age: 56
End: 2021-04-07

## 2021-07-06 ENCOUNTER — PATIENT MESSAGE (OUTPATIENT)
Dept: ADMINISTRATIVE | Facility: HOSPITAL | Age: 56
End: 2021-07-06

## 2021-08-03 ENCOUNTER — PATIENT MESSAGE (OUTPATIENT)
Dept: ADMINISTRATIVE | Facility: HOSPITAL | Age: 56
End: 2021-08-03

## 2021-08-09 ENCOUNTER — PATIENT OUTREACH (OUTPATIENT)
Dept: ADMINISTRATIVE | Facility: OTHER | Age: 56
End: 2021-08-09

## 2021-08-09 DIAGNOSIS — Z12.31 BREAST CANCER SCREENING BY MAMMOGRAM: ICD-10-CM

## 2021-08-09 DIAGNOSIS — E11.9 TYPE 2 DIABETES MELLITUS WITHOUT COMPLICATION, UNSPECIFIED WHETHER LONG TERM INSULIN USE: Primary | ICD-10-CM

## 2021-08-12 ENCOUNTER — OFFICE VISIT (OUTPATIENT)
Dept: OBSTETRICS AND GYNECOLOGY | Facility: CLINIC | Age: 56
End: 2021-08-12
Payer: OTHER GOVERNMENT

## 2021-08-12 VITALS
DIASTOLIC BLOOD PRESSURE: 82 MMHG | BODY MASS INDEX: 39.64 KG/M2 | WEIGHT: 223.75 LBS | SYSTOLIC BLOOD PRESSURE: 126 MMHG

## 2021-08-12 DIAGNOSIS — Z12.4 CERVICAL CANCER SCREENING: ICD-10-CM

## 2021-08-12 DIAGNOSIS — Z01.419 WELL WOMAN EXAM WITH ROUTINE GYNECOLOGICAL EXAM: Primary | ICD-10-CM

## 2021-08-12 DIAGNOSIS — N95.1 VAGINAL DRYNESS, MENOPAUSAL: ICD-10-CM

## 2021-08-12 DIAGNOSIS — N94.11 INTROITAL DYSPAREUNIA: ICD-10-CM

## 2021-08-12 PROCEDURE — 99999 PR PBB SHADOW E&M-EST. PATIENT-LVL III: ICD-10-PCS | Mod: PBBFAC,,, | Performed by: STUDENT IN AN ORGANIZED HEALTH CARE EDUCATION/TRAINING PROGRAM

## 2021-08-12 PROCEDURE — 99396 PREV VISIT EST AGE 40-64: CPT | Mod: S$GLB,,, | Performed by: STUDENT IN AN ORGANIZED HEALTH CARE EDUCATION/TRAINING PROGRAM

## 2021-08-12 PROCEDURE — 99396 PR PREVENTIVE VISIT,EST,40-64: ICD-10-PCS | Mod: S$GLB,,, | Performed by: STUDENT IN AN ORGANIZED HEALTH CARE EDUCATION/TRAINING PROGRAM

## 2021-08-12 PROCEDURE — 99999 PR PBB SHADOW E&M-EST. PATIENT-LVL III: CPT | Mod: PBBFAC,,, | Performed by: STUDENT IN AN ORGANIZED HEALTH CARE EDUCATION/TRAINING PROGRAM

## 2021-08-12 PROCEDURE — 87624 HPV HI-RISK TYP POOLED RSLT: CPT | Performed by: STUDENT IN AN ORGANIZED HEALTH CARE EDUCATION/TRAINING PROGRAM

## 2021-08-12 PROCEDURE — 88175 CYTOPATH C/V AUTO FLUID REDO: CPT | Performed by: STUDENT IN AN ORGANIZED HEALTH CARE EDUCATION/TRAINING PROGRAM

## 2021-08-19 LAB
FINAL PATHOLOGIC DIAGNOSIS: NORMAL
Lab: NORMAL

## 2021-08-20 ENCOUNTER — INFUSION (OUTPATIENT)
Dept: INFECTIOUS DISEASES | Facility: HOSPITAL | Age: 56
End: 2021-08-20
Attending: INTERNAL MEDICINE
Payer: OTHER GOVERNMENT

## 2021-08-20 VITALS
RESPIRATION RATE: 18 BRPM | OXYGEN SATURATION: 97 % | TEMPERATURE: 99 F | WEIGHT: 250 LBS | HEART RATE: 71 BPM | HEIGHT: 63 IN | SYSTOLIC BLOOD PRESSURE: 138 MMHG | BODY MASS INDEX: 44.3 KG/M2 | DIASTOLIC BLOOD PRESSURE: 72 MMHG

## 2021-08-20 DIAGNOSIS — U07.1 COVID-19: Primary | ICD-10-CM

## 2021-08-20 PROCEDURE — M0243 CASIRIVI AND IMDEVI INFUSION: HCPCS | Performed by: INTERNAL MEDICINE

## 2021-08-20 PROCEDURE — 25000003 PHARM REV CODE 250: Performed by: INTERNAL MEDICINE

## 2021-08-20 PROCEDURE — 63600175 PHARM REV CODE 636 W HCPCS: Performed by: INTERNAL MEDICINE

## 2021-08-20 RX ORDER — DIPHENHYDRAMINE HYDROCHLORIDE 50 MG/ML
25 INJECTION INTRAMUSCULAR; INTRAVENOUS ONCE AS NEEDED
Status: DISCONTINUED | OUTPATIENT
Start: 2021-08-20 | End: 2021-10-11

## 2021-08-20 RX ORDER — ONDANSETRON 4 MG/1
4 TABLET, ORALLY DISINTEGRATING ORAL ONCE AS NEEDED
Status: DISCONTINUED | OUTPATIENT
Start: 2021-08-20 | End: 2021-10-11

## 2021-08-20 RX ORDER — EPINEPHRINE 0.3 MG/.3ML
0.3 INJECTION SUBCUTANEOUS
Status: DISCONTINUED | OUTPATIENT
Start: 2021-08-20 | End: 2021-10-11

## 2021-08-20 RX ORDER — ACETAMINOPHEN 325 MG/1
650 TABLET ORAL ONCE AS NEEDED
Status: DISCONTINUED | OUTPATIENT
Start: 2021-08-20 | End: 2021-10-11

## 2021-08-20 RX ORDER — ALBUTEROL SULFATE 90 UG/1
2 AEROSOL, METERED RESPIRATORY (INHALATION)
Status: DISCONTINUED | OUTPATIENT
Start: 2021-08-20 | End: 2021-10-11

## 2021-08-20 RX ORDER — SODIUM CHLORIDE 0.9 % (FLUSH) 0.9 %
10 SYRINGE (ML) INJECTION
Status: DISCONTINUED | OUTPATIENT
Start: 2021-08-20 | End: 2021-10-11

## 2021-08-20 RX ADMIN — CASIRIVIMAB AND IMDEVIMAB 600 MG: 600; 600 INJECTION, SOLUTION, CONCENTRATE INTRAVENOUS at 02:08

## 2021-08-23 LAB
HPV HR 12 DNA SPEC QL NAA+PROBE: NEGATIVE
HPV16 AG SPEC QL: NEGATIVE
HPV18 DNA SPEC QL NAA+PROBE: NEGATIVE

## 2021-08-26 ENCOUNTER — PATIENT MESSAGE (OUTPATIENT)
Dept: OBSTETRICS AND GYNECOLOGY | Facility: CLINIC | Age: 56
End: 2021-08-26

## 2021-08-27 RX ORDER — ESTRADIOL 0.1 MG/G
CREAM VAGINAL
Qty: 30 G | Refills: 12 | Status: SHIPPED | OUTPATIENT
Start: 2021-08-27 | End: 2022-10-11

## 2021-09-22 DIAGNOSIS — E78.5 TYPE 2 DIABETES MELLITUS WITH HYPERLIPIDEMIA: ICD-10-CM

## 2021-09-22 DIAGNOSIS — E78.5 HYPERLIPIDEMIA, UNSPECIFIED HYPERLIPIDEMIA TYPE: ICD-10-CM

## 2021-09-22 DIAGNOSIS — E11.69 TYPE 2 DIABETES MELLITUS WITH HYPERLIPIDEMIA: ICD-10-CM

## 2021-09-22 RX ORDER — ATORVASTATIN CALCIUM 10 MG/1
TABLET, FILM COATED ORAL
Qty: 90 TABLET | Refills: 1 | Status: SHIPPED | OUTPATIENT
Start: 2021-09-22 | End: 2022-03-23

## 2021-09-22 RX ORDER — METFORMIN HYDROCHLORIDE 500 MG/1
TABLET, EXTENDED RELEASE ORAL
Qty: 180 TABLET | Refills: 1 | Status: SHIPPED | OUTPATIENT
Start: 2021-09-22 | End: 2022-04-11 | Stop reason: DRUGHIGH

## 2021-10-04 ENCOUNTER — APPOINTMENT (OUTPATIENT)
Dept: RADIOLOGY | Facility: OTHER | Age: 56
End: 2021-10-04
Attending: FAMILY MEDICINE
Payer: OTHER GOVERNMENT

## 2021-10-04 VITALS — BODY MASS INDEX: 44.3 KG/M2 | HEIGHT: 63 IN | WEIGHT: 250 LBS

## 2021-10-04 DIAGNOSIS — Z12.31 BREAST CANCER SCREENING BY MAMMOGRAM: ICD-10-CM

## 2021-10-04 PROCEDURE — 77067 MAMMO DIGITAL SCREENING BILAT WITH TOMO: ICD-10-PCS | Mod: 26,,, | Performed by: RADIOLOGY

## 2021-10-04 PROCEDURE — 77063 MAMMO DIGITAL SCREENING BILAT WITH TOMO: ICD-10-PCS | Mod: 26,,, | Performed by: RADIOLOGY

## 2021-10-04 PROCEDURE — 77067 SCR MAMMO BI INCL CAD: CPT | Mod: 26,,, | Performed by: RADIOLOGY

## 2021-10-04 PROCEDURE — 77067 SCR MAMMO BI INCL CAD: CPT | Mod: TC,PN

## 2021-10-04 PROCEDURE — 77063 BREAST TOMOSYNTHESIS BI: CPT | Mod: 26,,, | Performed by: RADIOLOGY

## 2021-10-11 ENCOUNTER — TELEPHONE (OUTPATIENT)
Dept: PRIMARY CARE CLINIC | Facility: CLINIC | Age: 56
End: 2021-10-11

## 2021-10-11 ENCOUNTER — OFFICE VISIT (OUTPATIENT)
Dept: PRIMARY CARE CLINIC | Facility: CLINIC | Age: 56
End: 2021-10-11
Payer: OTHER GOVERNMENT

## 2021-10-11 VITALS
RESPIRATION RATE: 18 BRPM | OXYGEN SATURATION: 99 % | WEIGHT: 249.13 LBS | DIASTOLIC BLOOD PRESSURE: 84 MMHG | SYSTOLIC BLOOD PRESSURE: 132 MMHG | BODY MASS INDEX: 44.14 KG/M2 | HEIGHT: 63 IN | HEART RATE: 64 BPM

## 2021-10-11 DIAGNOSIS — F32.9 REACTIVE DEPRESSION: ICD-10-CM

## 2021-10-11 DIAGNOSIS — E11.69 TYPE 2 DIABETES MELLITUS WITH HYPERLIPIDEMIA: Primary | ICD-10-CM

## 2021-10-11 DIAGNOSIS — E78.5 TYPE 2 DIABETES MELLITUS WITH HYPERLIPIDEMIA: Primary | ICD-10-CM

## 2021-10-11 DIAGNOSIS — F41.8 SITUATIONAL ANXIETY: ICD-10-CM

## 2021-10-11 DIAGNOSIS — Z23 NEED FOR VACCINATION: ICD-10-CM

## 2021-10-11 DIAGNOSIS — E78.5 HYPERLIPIDEMIA, UNSPECIFIED HYPERLIPIDEMIA TYPE: ICD-10-CM

## 2021-10-11 PROCEDURE — 99214 PR OFFICE/OUTPT VISIT, EST, LEVL IV, 30-39 MIN: ICD-10-PCS | Mod: 25,S$GLB,, | Performed by: FAMILY MEDICINE

## 2021-10-11 PROCEDURE — 90686 FLU VACCINE (QUAD) GREATER THAN OR EQUAL TO 3YO PRESERVATIVE FREE IM: ICD-10-PCS | Mod: S$GLB,,, | Performed by: FAMILY MEDICINE

## 2021-10-11 PROCEDURE — 99999 PR PBB SHADOW E&M-EST. PATIENT-LVL IV: CPT | Mod: PBBFAC,,, | Performed by: FAMILY MEDICINE

## 2021-10-11 PROCEDURE — 90471 IMMUNIZATION ADMIN: CPT | Mod: S$GLB,,, | Performed by: FAMILY MEDICINE

## 2021-10-11 PROCEDURE — 90686 IIV4 VACC NO PRSV 0.5 ML IM: CPT | Mod: S$GLB,,, | Performed by: FAMILY MEDICINE

## 2021-10-11 PROCEDURE — 90471 FLU VACCINE (QUAD) GREATER THAN OR EQUAL TO 3YO PRESERVATIVE FREE IM: ICD-10-PCS | Mod: S$GLB,,, | Performed by: FAMILY MEDICINE

## 2021-10-11 PROCEDURE — 99214 OFFICE O/P EST MOD 30 MIN: CPT | Mod: 25,S$GLB,, | Performed by: FAMILY MEDICINE

## 2021-10-11 PROCEDURE — 99999 PR PBB SHADOW E&M-EST. PATIENT-LVL IV: ICD-10-PCS | Mod: PBBFAC,,, | Performed by: FAMILY MEDICINE

## 2021-10-11 RX ORDER — ZOSTER VACCINE RECOMBINANT, ADJUVANTED 50 MCG/0.5
0.5 KIT INTRAMUSCULAR ONCE
Qty: 1 EACH | Refills: 1 | Status: SHIPPED | OUTPATIENT
Start: 2021-10-11 | End: 2021-10-11

## 2021-10-12 ENCOUNTER — TELEPHONE (OUTPATIENT)
Dept: PSYCHOLOGY | Facility: CLINIC | Age: 56
End: 2021-10-12

## 2021-10-18 ENCOUNTER — PATIENT MESSAGE (OUTPATIENT)
Dept: ADMINISTRATIVE | Facility: HOSPITAL | Age: 56
End: 2021-10-18
Payer: OTHER GOVERNMENT

## 2021-11-01 ENCOUNTER — PATIENT OUTREACH (OUTPATIENT)
Dept: ADMINISTRATIVE | Facility: HOSPITAL | Age: 56
End: 2021-11-01
Payer: OTHER GOVERNMENT

## 2021-11-08 ENCOUNTER — PATIENT OUTREACH (OUTPATIENT)
Dept: ADMINISTRATIVE | Facility: HOSPITAL | Age: 56
End: 2021-11-08
Payer: OTHER GOVERNMENT

## 2021-11-22 ENCOUNTER — PATIENT MESSAGE (OUTPATIENT)
Dept: PLASTIC SURGERY | Facility: CLINIC | Age: 56
End: 2021-11-22
Payer: OTHER GOVERNMENT

## 2021-11-22 ENCOUNTER — OFFICE VISIT (OUTPATIENT)
Dept: PRIMARY CARE CLINIC | Facility: CLINIC | Age: 56
End: 2021-11-22
Payer: OTHER GOVERNMENT

## 2021-11-22 ENCOUNTER — PATIENT MESSAGE (OUTPATIENT)
Dept: PRIMARY CARE CLINIC | Facility: CLINIC | Age: 56
End: 2021-11-22

## 2021-11-22 VITALS
BODY MASS INDEX: 38.67 KG/M2 | HEIGHT: 63 IN | SYSTOLIC BLOOD PRESSURE: 124 MMHG | WEIGHT: 218.25 LBS | HEART RATE: 59 BPM | DIASTOLIC BLOOD PRESSURE: 82 MMHG | OXYGEN SATURATION: 98 % | RESPIRATION RATE: 16 BRPM

## 2021-11-22 DIAGNOSIS — Z90.3 H/O GASTRIC SLEEVE: ICD-10-CM

## 2021-11-22 DIAGNOSIS — B37.2 CANDIDAL INTERTRIGO: Primary | ICD-10-CM

## 2021-11-22 DIAGNOSIS — L98.7 REDUNDANT SKIN OF ABDOMEN: Primary | ICD-10-CM

## 2021-11-22 DIAGNOSIS — B37.2 CANDIDAL INTERTRIGO: ICD-10-CM

## 2021-11-22 DIAGNOSIS — L98.7 REDUNDANT SKIN OF ABDOMEN: ICD-10-CM

## 2021-11-22 DIAGNOSIS — E66.01 SEVERE OBESITY (BMI 35.0-39.9) WITH COMORBIDITY: ICD-10-CM

## 2021-11-22 PROCEDURE — 99214 PR OFFICE/OUTPT VISIT, EST, LEVL IV, 30-39 MIN: ICD-10-PCS | Mod: S$GLB,,, | Performed by: FAMILY MEDICINE

## 2021-11-22 PROCEDURE — 99214 OFFICE O/P EST MOD 30 MIN: CPT | Mod: S$GLB,,, | Performed by: FAMILY MEDICINE

## 2021-11-22 PROCEDURE — 99999 PR PBB SHADOW E&M-EST. PATIENT-LVL IV: CPT | Mod: PBBFAC,,, | Performed by: FAMILY MEDICINE

## 2021-11-22 PROCEDURE — 99999 PR PBB SHADOW E&M-EST. PATIENT-LVL IV: ICD-10-PCS | Mod: PBBFAC,,, | Performed by: FAMILY MEDICINE

## 2021-11-22 RX ORDER — NYSTATIN 100000 U/G
CREAM TOPICAL 2 TIMES DAILY
Qty: 30 G | Refills: 3 | Status: SHIPPED | OUTPATIENT
Start: 2021-11-22 | End: 2022-10-11

## 2021-11-30 ENCOUNTER — PATIENT MESSAGE (OUTPATIENT)
Dept: PRIMARY CARE CLINIC | Facility: CLINIC | Age: 56
End: 2021-11-30
Payer: OTHER GOVERNMENT

## 2021-11-30 RX ORDER — DICLOFENAC SODIUM 75 MG/1
75 TABLET, DELAYED RELEASE ORAL 2 TIMES DAILY PRN
Qty: 30 TABLET | Refills: 1 | Status: SHIPPED | OUTPATIENT
Start: 2021-11-30 | End: 2022-10-11

## 2021-11-30 RX ORDER — TIZANIDINE 4 MG/1
4 TABLET ORAL 3 TIMES DAILY PRN
Qty: 30 TABLET | Refills: 1 | Status: SHIPPED | OUTPATIENT
Start: 2021-11-30 | End: 2021-12-20

## 2021-12-07 ENCOUNTER — PATIENT MESSAGE (OUTPATIENT)
Dept: PRIMARY CARE CLINIC | Facility: CLINIC | Age: 56
End: 2021-12-07
Payer: OTHER GOVERNMENT

## 2022-01-12 ENCOUNTER — PATIENT MESSAGE (OUTPATIENT)
Dept: PRIMARY CARE CLINIC | Facility: CLINIC | Age: 57
End: 2022-01-12
Payer: OTHER GOVERNMENT

## 2022-01-12 RX ORDER — METHOCARBAMOL 500 MG/1
500 TABLET, FILM COATED ORAL 3 TIMES DAILY PRN
Qty: 30 TABLET | Refills: 1 | Status: SHIPPED | OUTPATIENT
Start: 2022-01-12 | End: 2022-10-11

## 2022-01-21 ENCOUNTER — PATIENT MESSAGE (OUTPATIENT)
Dept: ADMINISTRATIVE | Facility: HOSPITAL | Age: 57
End: 2022-01-21
Payer: OTHER GOVERNMENT

## 2022-02-11 ENCOUNTER — PATIENT MESSAGE (OUTPATIENT)
Dept: PRIMARY CARE CLINIC | Facility: CLINIC | Age: 57
End: 2022-02-11
Payer: OTHER GOVERNMENT

## 2022-04-11 ENCOUNTER — OFFICE VISIT (OUTPATIENT)
Dept: PRIMARY CARE CLINIC | Facility: CLINIC | Age: 57
End: 2022-04-11
Payer: OTHER GOVERNMENT

## 2022-04-11 VITALS
OXYGEN SATURATION: 98 % | SYSTOLIC BLOOD PRESSURE: 118 MMHG | HEART RATE: 80 BPM | DIASTOLIC BLOOD PRESSURE: 76 MMHG | HEIGHT: 63 IN | RESPIRATION RATE: 18 BRPM | WEIGHT: 226 LBS | BODY MASS INDEX: 40.04 KG/M2

## 2022-04-11 DIAGNOSIS — E11.69 TYPE 2 DIABETES MELLITUS WITH HYPERLIPIDEMIA: Primary | ICD-10-CM

## 2022-04-11 DIAGNOSIS — E78.5 HYPERLIPIDEMIA, UNSPECIFIED HYPERLIPIDEMIA TYPE: ICD-10-CM

## 2022-04-11 DIAGNOSIS — J45.20 MILD INTERMITTENT ASTHMA WITHOUT COMPLICATION: ICD-10-CM

## 2022-04-11 DIAGNOSIS — E66.01 MORBID OBESITY WITH BMI OF 40.0-44.9, ADULT: ICD-10-CM

## 2022-04-11 DIAGNOSIS — E78.5 TYPE 2 DIABETES MELLITUS WITH HYPERLIPIDEMIA: Primary | ICD-10-CM

## 2022-04-11 PROCEDURE — 99999 PR PBB SHADOW E&M-EST. PATIENT-LVL IV: ICD-10-PCS | Mod: PBBFAC,,, | Performed by: FAMILY MEDICINE

## 2022-04-11 PROCEDURE — 99214 PR OFFICE/OUTPT VISIT, EST, LEVL IV, 30-39 MIN: ICD-10-PCS | Mod: S$GLB,,, | Performed by: FAMILY MEDICINE

## 2022-04-11 PROCEDURE — 99999 PR PBB SHADOW E&M-EST. PATIENT-LVL IV: CPT | Mod: PBBFAC,,, | Performed by: FAMILY MEDICINE

## 2022-04-11 PROCEDURE — 99214 OFFICE O/P EST MOD 30 MIN: CPT | Mod: S$GLB,,, | Performed by: FAMILY MEDICINE

## 2022-04-11 RX ORDER — ALBUTEROL SULFATE 90 UG/1
2 AEROSOL, METERED RESPIRATORY (INHALATION) EVERY 4 HOURS PRN
Qty: 18 G | Refills: 3 | Status: SHIPPED | OUTPATIENT
Start: 2022-04-11 | End: 2022-10-11 | Stop reason: SDUPTHER

## 2022-04-11 RX ORDER — METFORMIN HYDROCHLORIDE 750 MG/1
1500 TABLET, EXTENDED RELEASE ORAL
Qty: 180 TABLET | Refills: 3 | Status: SHIPPED | OUTPATIENT
Start: 2022-04-11 | End: 2022-10-11 | Stop reason: SDUPTHER

## 2022-04-11 RX ORDER — ATORVASTATIN CALCIUM 10 MG/1
10 TABLET, FILM COATED ORAL DAILY
Qty: 90 TABLET | Refills: 3 | Status: SHIPPED | OUTPATIENT
Start: 2022-04-11 | End: 2022-09-19

## 2022-04-11 RX ORDER — METFORMIN HYDROCHLORIDE 500 MG/1
TABLET, EXTENDED RELEASE ORAL
Qty: 180 TABLET | Refills: 1 | Status: CANCELLED | OUTPATIENT
Start: 2022-04-11

## 2022-04-11 RX ORDER — ALBUTEROL SULFATE 0.83 MG/ML
2.5 SOLUTION RESPIRATORY (INHALATION) EVERY 4 HOURS PRN
Qty: 150 ML | Refills: 3 | Status: SHIPPED | OUTPATIENT
Start: 2022-04-11 | End: 2023-11-19 | Stop reason: SDUPTHER

## 2022-04-11 NOTE — PROGRESS NOTES
"Subjective:       Patient ID: Rosina Koenig is a 57 y.o. female.    Chief Complaint: Medication Refill and Results (labs)    Feeling good, A1C up slightly to 6.9, says she had some dietary indiscretions recently, but committed to getting back on track. No recent illness or injury    Review of Systems   Constitutional: Negative for chills, fatigue and fever.   HENT: Negative for congestion.    Eyes: Negative for visual disturbance.   Respiratory: Negative for cough and shortness of breath.    Cardiovascular: Negative for chest pain.   Gastrointestinal: Negative for abdominal pain, nausea and vomiting.   Genitourinary: Negative for difficulty urinating.   Musculoskeletal: Negative for arthralgias.   Skin: Negative for rash.   Allergic/Immunologic: Negative for immunocompromised state.   Neurological: Negative for dizziness.   Hematological: Does not bruise/bleed easily.   Psychiatric/Behavioral: Negative for sleep disturbance.       Objective:      Vitals:    04/11/22 0752   BP: 118/76   BP Location: Right arm   Patient Position: Sitting   BP Method: Large (Manual)   Pulse: 80   Resp: 18   SpO2: 98%   Weight: 102.5 kg (225 lb 15.5 oz)   Height: 5' 3" (1.6 m)     Physical Exam  Vitals and nursing note reviewed.   Constitutional:       Appearance: She is well-developed.   HENT:      Head: Normocephalic and atraumatic.   Cardiovascular:      Rate and Rhythm: Normal rate and regular rhythm.      Heart sounds: Normal heart sounds.   Pulmonary:      Effort: Pulmonary effort is normal.      Breath sounds: Normal breath sounds.   Abdominal:      General: There is no distension.      Tenderness: There is no abdominal tenderness.   Musculoskeletal:      Right lower leg: No edema.      Left lower leg: No edema.   Skin:     General: Skin is warm and dry.   Neurological:      Mental Status: She is alert and oriented to person, place, and time.   Psychiatric:         Mood and Affect: Mood normal.         Lab Results "   Component Value Date    WBC 6.59 04/04/2022    HGB 11.9 (L) 04/04/2022    HCT 40.4 04/04/2022     04/04/2022    CHOL 121 04/04/2022    TRIG 63 04/04/2022    HDL 48 04/04/2022    ALT 13 04/04/2022    AST 15 04/04/2022     04/04/2022    K 4.4 04/04/2022     04/04/2022    CREATININE 0.9 04/04/2022    BUN 14 04/04/2022    CO2 19 (L) 04/04/2022    TSH 1.75 02/06/2019    HGBA1C 6.9 (H) 04/04/2022      Assessment:       1. Type 2 diabetes mellitus with hyperlipidemia    2. Hyperlipidemia, unspecified hyperlipidemia type    3. Morbid obesity with BMI of 40.0-44.9, adult    4. Mild intermittent asthma without complication        Plan:       Type 2 diabetes mellitus with hyperlipidemia  -     metFORMIN (GLUCOPHAGE-XR) 750 MG ER 24hr tablet; Take 2 tablets (1,500 mg total) by mouth daily with breakfast.  Dispense: 180 tablet; Refill: 3  -     Hemoglobin A1C; Future; Expected date: 10/11/2022  -     Microalbumin/Creatinine Ratio, Urine; Future; Expected date: 10/11/2022  -     Basic Metabolic Panel; Future; Expected date: 10/11/2022  Needs tighter glycemic control, will bump up metformin  Hyperlipidemia, unspecified hyperlipidemia type  -     atorvastatin (LIPITOR) 10 MG tablet; Take 1 tablet (10 mg total) by mouth once daily.  Dispense: 90 tablet; Refill: 3  Well controlled  Morbid obesity with BMI of 40.0-44.9, adult  Low carb diet, exercise  Mild intermittent asthma without complication  -     albuterol (PROAIR HFA) 90 mcg/actuation inhaler; Inhale 2 puffs into the lungs every 4 (four) hours as needed for Wheezing or Shortness of Breath. Rescue  Dispense: 18 g; Refill: 3  -     albuterol (PROVENTIL) 2.5 mg /3 mL (0.083 %) nebulizer solution; Take 3 mLs (2.5 mg total) by nebulization every 4 (four) hours as needed for Wheezing or Shortness of Breath. Rescue  Dispense: 150 mL; Refill: 3  -     NEBULIZER FOR HOME USE  stable    Medication List with Changes/Refills   New Medications    METFORMIN  (GLUCOPHAGE-XR) 750 MG ER 24HR TABLET    Take 2 tablets (1,500 mg total) by mouth daily with breakfast.   Current Medications    ASPIRIN (ECOTRIN) 81 MG EC TABLET    Take 81 mg by mouth once daily.    CALCIUM-VITAMIN D 600 MG(1,500MG) -400 UNIT TAB    Take by mouth.    CETIRIZINE (ZYRTEC) 10 MG TABLET    Take 10 mg by mouth once daily.    DICLOFENAC (VOLTAREN) 75 MG EC TABLET    Take 1 tablet (75 mg total) by mouth 2 (two) times daily as needed (pain).    ESTRADIOL (ESTRACE) 0.01 % (0.1 MG/GRAM) VAGINAL CREAM    Use pea-sized amount vaginally nightly for 2 weeks, then 2x/week    FLUTICASONE (FLONASE) 50 MCG/ACTUATION NASAL SPRAY    2 sprays by Each Nare route daily as needed. 2 Spray, Suspension Nasal Every day    METHOCARBAMOL (ROBAXIN) 500 MG TAB    Take 1 tablet (500 mg total) by mouth 3 (three) times daily as needed (Muscle spasms).    NYSTATIN (MYCOSTATIN) CREAM    Apply topically 2 (two) times daily.   Changed and/or Refilled Medications    Modified Medication Previous Medication    ALBUTEROL (PROAIR HFA) 90 MCG/ACTUATION INHALER albuterol (PROAIR HFA) 90 mcg/actuation inhaler       Inhale 2 puffs into the lungs every 4 (four) hours as needed for Wheezing or Shortness of Breath. Rescue    Inhale 2 puffs into the lungs every 4 (four) hours as needed for Wheezing or Shortness of Breath. Rescue    ALBUTEROL (PROVENTIL) 2.5 MG /3 ML (0.083 %) NEBULIZER SOLUTION albuterol (PROVENTIL) 2.5 mg /3 mL (0.083 %) nebulizer solution       Take 3 mLs (2.5 mg total) by nebulization every 4 (four) hours as needed for Wheezing or Shortness of Breath. Rescue    Take 3 mLs (2.5 mg total) by nebulization every 4 (four) hours as needed for Wheezing or Shortness of Breath. Rescue    ATORVASTATIN (LIPITOR) 10 MG TABLET atorvastatin (LIPITOR) 10 MG tablet       Take 1 tablet (10 mg total) by mouth once daily.    TAKE 1 TABLET BY MOUTH EVERY DAY   Discontinued Medications    METFORMIN (GLUCOPHAGE-XR) 500 MG ER 24HR TABLET    TAKE 2  TABLETS BY MOUTH EVERY DAY WITH DINNER

## 2022-04-13 ENCOUNTER — PATIENT MESSAGE (OUTPATIENT)
Dept: PRIMARY CARE CLINIC | Facility: CLINIC | Age: 57
End: 2022-04-13
Payer: OTHER GOVERNMENT

## 2022-05-10 LAB
LEFT EYE DM RETINOPATHY: NEGATIVE
RIGHT EYE DM RETINOPATHY: NEGATIVE

## 2022-05-16 ENCOUNTER — PATIENT OUTREACH (OUTPATIENT)
Dept: ADMINISTRATIVE | Facility: HOSPITAL | Age: 57
End: 2022-05-16
Payer: OTHER GOVERNMENT

## 2022-07-15 ENCOUNTER — TELEPHONE (OUTPATIENT)
Dept: PRIMARY CARE CLINIC | Facility: CLINIC | Age: 57
End: 2022-07-15

## 2022-07-15 NOTE — TELEPHONE ENCOUNTER
Christina Guzman Staff  Caller: Pt [phone] (Today, 10:17 AM)  Patient is requesting orders for the Covid infusion, she tested positive yesterday with a home covid test.  She went to [place] to get the infusion an was told she needs the orders.   Patient would like to get it done today, she is cough, sob & lost of phlem.     Please call and advise.     Thank You

## 2022-07-15 NOTE — TELEPHONE ENCOUNTER
2     Patient did an at home covid test last night and it was positive. Wednesday it was negative. She had just started having symptoms Wednesday morning of SOB, nasal congestion and cough    Patient is doing flex seltzer cold and cough and using her inhaler and nebulizer solutions.    Patient has a risk score of 2. She is requesting the infusion therapy or Paxlovid

## 2022-07-15 NOTE — TELEPHONE ENCOUNTER
----- Message from Deepa Wayneze sent at 7/15/2022 10:12 AM CDT -----  Type:  Needs Medical Advice    Who Called: self  Reason:tested positive for covid and wants to get the infusion ASAP. She called to schedule there [facility] but when I told her she needs an order she hung up on me   Would the patient rather a call back or a response via [employer]? call  Best Call Back Number: [phone]  Additional Information: none

## 2022-07-18 ENCOUNTER — TELEPHONE (OUTPATIENT)
Dept: PRIMARY CARE CLINIC | Facility: CLINIC | Age: 57
End: 2022-07-18
Payer: OTHER GOVERNMENT

## 2022-07-18 NOTE — TELEPHONE ENCOUNTER
Spoke with patient. States she has sinus pressure and headaches, cough, phlegm (no color), no nausea. Took home test on Thursday night and it was positive. Requesting paxlovid.

## 2022-07-18 NOTE — TELEPHONE ENCOUNTER
Pres[facility].  Needs to hold atorvastatin for the next 10 days due to potential drug interactions.  Seek medical attention for any worsening symptoms

## 2022-07-18 NOTE — TELEPHONE ENCOUNTER
----- Message from Brittney Prado sent at 7/18/2022  8:04 AM CDT -----  Contact: pt [phone]  2nd Request    Patient tested positive for COVID-19 on 07/14/2022 and wants to know is there anything they should be doing and is a Rx needed.    Please call and advise.     Thank You

## 2022-09-27 ENCOUNTER — PATIENT MESSAGE (OUTPATIENT)
Dept: PRIMARY CARE CLINIC | Facility: CLINIC | Age: 57
End: 2022-09-27
Payer: OTHER GOVERNMENT

## 2022-10-11 ENCOUNTER — OFFICE VISIT (OUTPATIENT)
Dept: PRIMARY CARE CLINIC | Facility: CLINIC | Age: 57
End: 2022-10-11
Payer: OTHER GOVERNMENT

## 2022-10-11 VITALS
BODY MASS INDEX: 38.8 KG/M2 | DIASTOLIC BLOOD PRESSURE: 72 MMHG | SYSTOLIC BLOOD PRESSURE: 122 MMHG | HEIGHT: 63 IN | WEIGHT: 219 LBS | TEMPERATURE: 96 F | HEART RATE: 55 BPM | RESPIRATION RATE: 18 BRPM | OXYGEN SATURATION: 97 %

## 2022-10-11 DIAGNOSIS — Z23 NEED FOR VACCINATION: ICD-10-CM

## 2022-10-11 DIAGNOSIS — E78.5 TYPE 2 DIABETES MELLITUS WITH HYPERLIPIDEMIA: Primary | ICD-10-CM

## 2022-10-11 DIAGNOSIS — Z12.31 SCREENING MAMMOGRAM FOR BREAST CANCER: ICD-10-CM

## 2022-10-11 DIAGNOSIS — E78.5 HYPERLIPIDEMIA, UNSPECIFIED HYPERLIPIDEMIA TYPE: ICD-10-CM

## 2022-10-11 DIAGNOSIS — J45.20 MILD INTERMITTENT ASTHMA WITHOUT COMPLICATION: ICD-10-CM

## 2022-10-11 DIAGNOSIS — E11.69 TYPE 2 DIABETES MELLITUS WITH HYPERLIPIDEMIA: Primary | ICD-10-CM

## 2022-10-11 PROCEDURE — 99214 PR OFFICE/OUTPT VISIT, EST, LEVL IV, 30-39 MIN: ICD-10-PCS | Mod: 25,S$GLB,, | Performed by: FAMILY MEDICINE

## 2022-10-11 PROCEDURE — 90471 FLU VACCINE (QUAD) GREATER THAN OR EQUAL TO 3YO PRESERVATIVE FREE IM: ICD-10-PCS | Mod: S$GLB,,, | Performed by: FAMILY MEDICINE

## 2022-10-11 PROCEDURE — 90471 IMMUNIZATION ADMIN: CPT | Mod: S$GLB,,, | Performed by: FAMILY MEDICINE

## 2022-10-11 PROCEDURE — 99214 OFFICE O/P EST MOD 30 MIN: CPT | Mod: 25,S$GLB,, | Performed by: FAMILY MEDICINE

## 2022-10-11 PROCEDURE — 90472 IMMUNIZATION ADMIN EACH ADD: CPT | Mod: S$GLB,,, | Performed by: FAMILY MEDICINE

## 2022-10-11 PROCEDURE — 90686 FLU VACCINE (QUAD) GREATER THAN OR EQUAL TO 3YO PRESERVATIVE FREE IM: ICD-10-PCS | Mod: S$GLB,,, | Performed by: FAMILY MEDICINE

## 2022-10-11 PROCEDURE — 90677 PNEUMOCOCCAL CONJUGATE VACCINE 20-VALENT: ICD-10-PCS | Mod: S$GLB,,, | Performed by: FAMILY MEDICINE

## 2022-10-11 PROCEDURE — 90677 PCV20 VACCINE IM: CPT | Mod: S$GLB,,, | Performed by: FAMILY MEDICINE

## 2022-10-11 PROCEDURE — 90686 IIV4 VACC NO PRSV 0.5 ML IM: CPT | Mod: S$GLB,,, | Performed by: FAMILY MEDICINE

## 2022-10-11 PROCEDURE — 99999 PR PBB SHADOW E&M-EST. PATIENT-LVL IV: ICD-10-PCS | Mod: PBBFAC,,, | Performed by: FAMILY MEDICINE

## 2022-10-11 PROCEDURE — 90472 PNEUMOCOCCAL CONJUGATE VACCINE 20-VALENT: ICD-10-PCS | Mod: S$GLB,,, | Performed by: FAMILY MEDICINE

## 2022-10-11 PROCEDURE — 99999 PR PBB SHADOW E&M-EST. PATIENT-LVL IV: CPT | Mod: PBBFAC,,, | Performed by: FAMILY MEDICINE

## 2022-10-11 RX ORDER — ATORVASTATIN CALCIUM 10 MG/1
10 TABLET, FILM COATED ORAL DAILY
Qty: 90 TABLET | Refills: 3 | Status: SHIPPED | OUTPATIENT
Start: 2022-10-11 | End: 2023-05-08 | Stop reason: SDUPTHER

## 2022-10-11 RX ORDER — METFORMIN HYDROCHLORIDE 750 MG/1
1500 TABLET, EXTENDED RELEASE ORAL
Qty: 180 TABLET | Refills: 3 | Status: SHIPPED | OUTPATIENT
Start: 2022-10-11 | End: 2023-11-19 | Stop reason: SDUPTHER

## 2022-10-11 RX ORDER — ALBUTEROL SULFATE 90 UG/1
2 AEROSOL, METERED RESPIRATORY (INHALATION) EVERY 4 HOURS PRN
Qty: 18 G | Refills: 3 | Status: SHIPPED | OUTPATIENT
Start: 2022-10-11 | End: 2023-11-19

## 2022-10-11 NOTE — PROGRESS NOTES
"Subjective:       Patient ID: Rosina Koenig is a 57 y.o. female.    Chief Complaint: Follow-up (Pt states in for a follow-up)    Recent labs look great, excellent glycemic control. Down another 6 pounds. Feeling good physically, struggling with her 's ongoing alcohol dependency. He just got back from 80-day inpatient rehab stay, but relapsed last week. Says she feels safe    Follow-up  Pertinent negatives include no abdominal pain, arthralgias, chest pain, chills, congestion, coughing, fatigue, fever, nausea, rash or vomiting.   Review of Systems   Constitutional:  Negative for chills, fatigue and fever.   HENT:  Negative for congestion.    Eyes:  Negative for visual disturbance.   Respiratory:  Negative for cough and shortness of breath.    Cardiovascular:  Negative for chest pain.   Gastrointestinal:  Negative for abdominal pain, nausea and vomiting.   Genitourinary:  Negative for difficulty urinating.   Musculoskeletal:  Negative for arthralgias.   Skin:  Negative for rash.   Neurological:  Negative for dizziness.   Psychiatric/Behavioral:  Negative for sleep disturbance.      Objective:      Vitals:    10/11/22 0825   BP: 122/72   BP Location: Right arm   Patient Position: Sitting   BP Method: Medium (Manual)   Pulse: (!) 55   Resp: 18   Temp: 96.4 °F (35.8 °C)   TempSrc: Temporal   SpO2: 97%   Weight: 99.3 kg (219 lb 0.4 oz)   Height: 5' 3" (1.6 m)     Physical Exam  Vitals and nursing note reviewed.   Constitutional:       General: She is not in acute distress.     Appearance: Normal appearance. She is well-developed.   HENT:      Head: Normocephalic and atraumatic.   Cardiovascular:      Rate and Rhythm: Normal rate and regular rhythm.      Pulses:           Dorsalis pedis pulses are 2+ on the right side and 2+ on the left side.      Heart sounds: Normal heart sounds.   Pulmonary:      Effort: Pulmonary effort is normal.      Breath sounds: Normal breath sounds.   Musculoskeletal:      Right " lower leg: No edema.      Left lower leg: No edema.   Feet:      Right foot:      Protective Sensation: 10 sites tested.  10 sites sensed.      Skin integrity: Skin integrity normal.      Left foot:      Protective Sensation: 10 sites tested.  10 sites sensed.      Skin integrity: Skin integrity normal.   Skin:     General: Skin is warm and dry.   Neurological:      Mental Status: She is alert and oriented to person, place, and time.   Psychiatric:         Mood and Affect: Mood normal.         Behavior: Behavior normal.       Lab Results   Component Value Date    WBC 6.59 04/04/2022    HGB 11.9 (L) 04/04/2022    HCT 40.4 04/04/2022     04/04/2022    CHOL 121 04/04/2022    TRIG 63 04/04/2022    HDL 48 04/04/2022    ALT 13 04/04/2022    AST 15 04/04/2022     10/04/2022    K 3.8 10/04/2022     10/04/2022    CREATININE 0.8 10/04/2022    BUN 14 10/04/2022    CO2 25 10/04/2022    TSH 1.75 02/06/2019    HGBA1C 6.5 (H) 10/04/2022      Assessment:       1. Type 2 diabetes mellitus with hyperlipidemia    2. Screening mammogram for breast cancer    3. Need for vaccination    4. Hyperlipidemia, unspecified hyperlipidemia type    5. Mild intermittent asthma without complication          Plan:       Type 2 diabetes mellitus with hyperlipidemia  -     metFORMIN (GLUCOPHAGE-XR) 750 MG ER 24hr tablet; Take 2 tablets (1,500 mg total) by mouth daily with breakfast.  Dispense: 180 tablet; Refill: 3  -     CBC Auto Differential; Future; Expected date: 04/11/2023  -     Comprehensive Metabolic Panel; Future; Expected date: 04/11/2023  -     Hemoglobin A1C; Future; Expected date: 04/11/2023  -     HM DIABETES FOOT EXAM  Excellent glycemic control, continue current regimen  Screening mammogram for breast cancer  -     Mammo Digital Screening Bilat w/ Zev; Future; Expected date: 10/11/2022    Need for vaccination  -     Influenza - Quadrivalent *Preferred* (6 months+) (PF)  -     (In Office Administered) Pneumococcal  Conjugate Vaccine (20 Valent) (IM)    Hyperlipidemia, unspecified hyperlipidemia type  -     atorvastatin (LIPITOR) 10 MG tablet; Take 1 tablet (10 mg total) by mouth once daily.  Dispense: 90 tablet; Refill: 3  -     CBC Auto Differential; Future; Expected date: 04/11/2023  -     Comprehensive Metabolic Panel; Future; Expected date: 04/11/2023  -     Lipid Panel; Future; Expected date: 04/11/2023  stable  Mild intermittent asthma without complication  -     albuterol (PROAIR HFA) 90 mcg/actuation inhaler; Inhale 2 puffs into the lungs every 4 (four) hours as needed for Wheezing or Shortness of Breath. Rescue  Dispense: 18 g; Refill: 3  -     CBC Auto Differential; Future; Expected date: 04/11/2023  Stable, continue prn albuterol  Medication List with Changes/Refills   Current Medications    ALBUTEROL (PROVENTIL) 2.5 MG /3 ML (0.083 %) NEBULIZER SOLUTION    Take 3 mLs (2.5 mg total) by nebulization every 4 (four) hours as needed for Wheezing or Shortness of Breath. Rescue    ASPIRIN (ECOTRIN) 81 MG EC TABLET    Take 81 mg by mouth once daily.    CALCIUM-VITAMIN D 600 MG(1,500MG) -400 UNIT TAB    Take by mouth.    CETIRIZINE (ZYRTEC) 10 MG TABLET    Take 10 mg by mouth once daily.    FLUTICASONE (FLONASE) 50 MCG/ACTUATION NASAL SPRAY    2 sprays by Each Nare route daily as needed. 2 Spray, Suspension Nasal Every day   Changed and/or Refilled Medications    Modified Medication Previous Medication    ALBUTEROL (PROAIR HFA) 90 MCG/ACTUATION INHALER albuterol (PROAIR HFA) 90 mcg/actuation inhaler       Inhale 2 puffs into the lungs every 4 (four) hours as needed for Wheezing or Shortness of Breath. Rescue    Inhale 2 puffs into the lungs every 4 (four) hours as needed for Wheezing or Shortness of Breath. Rescue    ATORVASTATIN (LIPITOR) 10 MG TABLET atorvastatin (LIPITOR) 10 MG tablet       Take 1 tablet (10 mg total) by mouth once daily.    TAKE 1 TABLET(10 MG) BY MOUTH EVERY DAY    METFORMIN (GLUCOPHAGE-XR) 750 MG ER  24HR TABLET metFORMIN (GLUCOPHAGE-XR) 750 MG ER 24hr tablet       Take 2 tablets (1,500 mg total) by mouth daily with breakfast.    Take 2 tablets (1,500 mg total) by mouth daily with breakfast.   Discontinued Medications    DICLOFENAC (VOLTAREN) 75 MG EC TABLET    Take 1 tablet (75 mg total) by mouth 2 (two) times daily as needed (pain).    ESTRADIOL (ESTRACE) 0.01 % (0.1 MG/GRAM) VAGINAL CREAM    Use pea-sized amount vaginally nightly for 2 weeks, then 2x/week    METHOCARBAMOL (ROBAXIN) 500 MG TAB    Take 1 tablet (500 mg total) by mouth 3 (three) times daily as needed (Muscle spasms).    NYSTATIN (MYCOSTATIN) CREAM    Apply topically 2 (two) times daily.

## 2022-10-11 NOTE — PROGRESS NOTES
Verified pt by name and .Allergies verified by pt. Per physician orders pt was administered Prevnar 20 IM to left deltoid and Influenza Vaccine IM to left deltoid using aseptic technique. Pt tolerated well. No adverse effects or pain reported. MD notified.

## 2022-10-14 ENCOUNTER — IMMUNIZATION (OUTPATIENT)
Dept: PRIMARY CARE CLINIC | Facility: CLINIC | Age: 57
End: 2022-10-14
Payer: OTHER GOVERNMENT

## 2022-10-14 DIAGNOSIS — Z23 NEED FOR VACCINATION: Primary | ICD-10-CM

## 2022-10-14 PROCEDURE — 91312 COVID-19, MRNA, LNP-S, BIVALENT BOOSTER, PF, 30 MCG/0.3 ML DOSE: CPT | Mod: S$GLB,,, | Performed by: FAMILY MEDICINE

## 2022-10-14 PROCEDURE — 0124A COVID-19, MRNA, LNP-S, BIVALENT BOOSTER, PF, 30 MCG/0.3 ML DOSE: CPT | Mod: PBBFAC | Performed by: FAMILY MEDICINE

## 2022-10-14 PROCEDURE — 91312 COVID-19, MRNA, LNP-S, BIVALENT BOOSTER, PF, 30 MCG/0.3 ML DOSE: ICD-10-PCS | Mod: S$GLB,,, | Performed by: FAMILY MEDICINE

## 2022-11-01 ENCOUNTER — HOSPITAL ENCOUNTER (OUTPATIENT)
Dept: RADIOLOGY | Facility: HOSPITAL | Age: 57
Discharge: HOME | End: 2022-11-01
Attending: FAMILY MEDICINE
Payer: OTHER GOVERNMENT

## 2022-11-01 VITALS — HEIGHT: 63 IN | WEIGHT: 218.94 LBS | BODY MASS INDEX: 38.79 KG/M2

## 2022-11-01 DIAGNOSIS — Z12.31 SCREENING MAMMOGRAM FOR BREAST CANCER: ICD-10-CM

## 2022-11-01 PROCEDURE — 77067 SCR MAMMO BI INCL CAD: CPT | Mod: 26,,, | Performed by: RADIOLOGY

## 2022-11-01 PROCEDURE — 77063 BREAST TOMOSYNTHESIS BI: CPT | Mod: 26,,, | Performed by: RADIOLOGY

## 2022-11-01 PROCEDURE — 77067 MAMMO DIGITAL SCREENING BILAT WITH TOMO: ICD-10-PCS | Mod: 26,,, | Performed by: RADIOLOGY

## 2022-11-01 PROCEDURE — 77063 MAMMO DIGITAL SCREENING BILAT WITH TOMO: ICD-10-PCS | Mod: 26,,, | Performed by: RADIOLOGY

## 2022-11-01 PROCEDURE — 77063 BREAST TOMOSYNTHESIS BI: CPT | Mod: TC

## 2022-11-01 PROCEDURE — 77067 SCR MAMMO BI INCL CAD: CPT | Mod: TC

## 2022-11-03 ENCOUNTER — OFFICE VISIT (OUTPATIENT)
Dept: OBSTETRICS AND GYNECOLOGY | Facility: CLINIC | Age: 57
End: 2022-11-03
Payer: OTHER GOVERNMENT

## 2022-11-03 ENCOUNTER — PATIENT MESSAGE (OUTPATIENT)
Dept: OBSTETRICS AND GYNECOLOGY | Facility: CLINIC | Age: 57
End: 2022-11-03

## 2022-11-03 VITALS
WEIGHT: 214 LBS | DIASTOLIC BLOOD PRESSURE: 72 MMHG | SYSTOLIC BLOOD PRESSURE: 122 MMHG | BODY MASS INDEX: 37.92 KG/M2 | HEIGHT: 63 IN

## 2022-11-03 DIAGNOSIS — N89.8 VAGINAL DRYNESS: ICD-10-CM

## 2022-11-03 DIAGNOSIS — Z01.419 WELL WOMAN EXAM WITH ROUTINE GYNECOLOGICAL EXAM: Primary | ICD-10-CM

## 2022-11-03 DIAGNOSIS — N39.3 SUI (STRESS URINARY INCONTINENCE, FEMALE): ICD-10-CM

## 2022-11-03 DIAGNOSIS — R35.0 URINARY FREQUENCY: ICD-10-CM

## 2022-11-03 PROCEDURE — 99396 PREV VISIT EST AGE 40-64: CPT | Mod: S$GLB,,, | Performed by: STUDENT IN AN ORGANIZED HEALTH CARE EDUCATION/TRAINING PROGRAM

## 2022-11-03 PROCEDURE — 87086 URINE CULTURE/COLONY COUNT: CPT | Performed by: STUDENT IN AN ORGANIZED HEALTH CARE EDUCATION/TRAINING PROGRAM

## 2022-11-03 PROCEDURE — 99396 PR PREVENTIVE VISIT,EST,40-64: ICD-10-PCS | Mod: S$GLB,,, | Performed by: STUDENT IN AN ORGANIZED HEALTH CARE EDUCATION/TRAINING PROGRAM

## 2022-11-03 PROCEDURE — 99999 PR PBB SHADOW E&M-EST. PATIENT-LVL III: CPT | Mod: PBBFAC,,, | Performed by: STUDENT IN AN ORGANIZED HEALTH CARE EDUCATION/TRAINING PROGRAM

## 2022-11-03 PROCEDURE — 99999 PR PBB SHADOW E&M-EST. PATIENT-LVL III: ICD-10-PCS | Mod: PBBFAC,,, | Performed by: STUDENT IN AN ORGANIZED HEALTH CARE EDUCATION/TRAINING PROGRAM

## 2022-11-03 NOTE — PROGRESS NOTES
History & Physical  Gynecology      SUBJECTIVE:     Chief Complaint: Well Woman     History of Present Illness:  57 y.o.   here for WWE. She is postmenopausal.  Denies postmenopausal bleeding.   Previously using osphena for vaginal dryness/dyspareunia with good response but got too expensive. Not using anything despite ongoing sx.   C/o worsening MARY JANE.    Sexually active: Yes, painful due to dryness as above    Cervical cancer screening: up to date   Most recent:  NILM/HR HPV neg    History abnormal: NO  Breast cancer screening: UTD (done this month, read pending)  Colon cancer screening: UTD scope 2016; 10-yr interval    Family history breast cancer: no  Family history ovarian cancer: no  Family history colon cancer: MGF        Review of patient's allergies indicates:   Allergen Reactions    Lisinopril Other (See Comments)     cough       Past Medical History:   Diagnosis Date    Asthma     Diabetes     Hyperlipidemia     Neuropathy in diabetes     Obesity     ZAYNAB (obstructive sleep apnea)     Pedal edema      Past Surgical History:   Procedure Laterality Date     SECTION      X 1    GASTRIC RESTRICTION SURGERY  2012    LOST 125#    HYSTEROSCOPY WITH DILATION AND CURETTAGE OF UTERUS N/A 2019    Procedure: HYSTEROSCOPY, WITH DILATION AND CURETTAGE OF UTERUS;  Surgeon: Aashish Ojeda MD;  Location: [facility] OR;  Service: OB/GYN;  Laterality: N/A;  [facility] CONFIRMED /DME  [employer] [employer] CONFIRMED 5/10/    SPINE SURGERY      TONSILLECTOMY, ADENOIDECTOMY      UMBILICAL HERNIA REPAIR       OB History          1    Para   1    Term   1            AB        Living   1         SAB        IAB        Ectopic        Multiple        Live Births                   Family History   Problem Relation Age of Onset    Arthritis Mother     Asthma Mother     Diabetes Mother     Stroke Mother     Vision loss Mother     Arthritis Father     Diabetes Father     Hypertension Father     Arthritis  Maternal Uncle     Diabetes Maternal Uncle     Hearing loss Maternal Uncle     Heart disease Maternal Uncle     Hypertension Maternal Uncle     Arthritis Paternal Aunt     Diabetes Paternal Aunt     Hypertension Paternal Aunt     Colon cancer Maternal Grandfather     Breast cancer Neg Hx     Ovarian cancer Neg Hx      Social History     Tobacco Use    Smoking status: Former    Smokeless tobacco: Never   Substance Use Topics    Alcohol use: Yes     Comment: occasionally    Drug use: No       Current Outpatient Medications   Medication Sig    albuterol (PROAIR HFA) 90 mcg/actuation inhaler Inhale 2 puffs into the lungs every 4 (four) hours as needed for Wheezing or Shortness of Breath. Rescue    albuterol (PROVENTIL) 2.5 mg /3 mL (0.083 %) nebulizer solution Take 3 mLs (2.5 mg total) by nebulization every 4 (four) hours as needed for Wheezing or Shortness of Breath. Rescue    aspirin (ECOTRIN) 81 MG EC tablet Take 81 mg by mouth once daily.    atorvastatin (LIPITOR) 10 MG tablet Take 1 tablet (10 mg total) by mouth once daily.    calcium-vitamin D 600 mg(1,500mg) -400 unit Tab Take by mouth.    cetirizine (ZYRTEC) 10 MG tablet Take 10 mg by mouth once daily.    fluticasone (FLONASE) 50 mcg/actuation nasal spray 2 sprays by Each Nare route daily as needed. 2 Spray, Suspension Nasal Every day    metFORMIN (GLUCOPHAGE-XR) 750 MG ER 24hr tablet Take 2 tablets (1,500 mg total) by mouth daily with breakfast.    estradioL (ESTRACE) 0.01 % (0.1 mg/gram) vaginal cream Use pea-sized amount vaginally nightly for 2 weeks, then 2x/week     No current facility-administered medications for this visit.         Review of Systems:  Review of Systems   Constitutional:  Negative for chills, fever and unexpected weight change.   HENT:  Negative for tinnitus.    Respiratory:  Negative for cough and shortness of breath.    Cardiovascular:  Negative for chest pain, palpitations and leg swelling.   Gastrointestinal:  Negative for abdominal  pain, constipation, diarrhea, nausea and vomiting.   Endocrine: Positive for diabetes. Negative for hyperthyroidism and hypothyroidism.   Genitourinary:  Positive for bladder incontinence, dyspareunia and vaginal dryness. Negative for dysuria, pelvic pain, vaginal discharge and postmenopausal bleeding.   Musculoskeletal:  Negative for myalgias.   Integumentary:  Negative for rash, hair changes, breast mass, nipple discharge, breast skin changes and breast tenderness.   Neurological:  Negative for seizures, syncope and headaches.   Hematological:  Does not bruise/bleed easily.   Psychiatric/Behavioral:  Negative for depression. The patient is not nervous/anxious.    Breast: Negative for lump, mass, mastodynia, nipple discharge, skin changes and tenderness     OBJECTIVE:     Physical Exam:  Physical Exam  Exam conducted with a chaperone present.   Constitutional:       General: She is not in acute distress.     Appearance: Normal appearance. She is well-developed.   HENT:      Head: Normocephalic and atraumatic.      Nose: No epistaxis.   Eyes:      Conjunctiva/sclera: Conjunctivae normal.   Pulmonary:      Effort: Pulmonary effort is normal. No respiratory distress.   Chest:   Breasts:     Right: No inverted nipple, mass, nipple discharge, skin change or tenderness.      Left: No inverted nipple, mass, nipple discharge, skin change or tenderness.   Abdominal:      General: There is no distension.      Palpations: Abdomen is soft. There is no mass.      Tenderness: There is no abdominal tenderness. There is no guarding or rebound.      Hernia: No hernia is present.   Genitourinary:     General: Normal vulva.      Pubic Area: No rash.       Labia:         Right: No rash, tenderness or lesion.         Left: No rash, tenderness or lesion.       Urethra: No prolapse, urethral pain, urethral swelling or urethral lesion.      Vagina: Normal. No vaginal discharge, tenderness or bleeding.      Cervix: No cervical motion  tenderness, discharge, friability or lesion.      Uterus: Normal. Not enlarged and not tender.       Adnexa: Right adnexa normal and left adnexa normal.        Right: No mass, tenderness or fullness.          Left: No mass, tenderness or fullness.     Musculoskeletal:         General: No tenderness. Normal range of motion.      Right lower leg: No edema.      Left lower leg: No edema.   Skin:     General: Skin is warm and dry.      Findings: No erythema.   Neurological:      Mental Status: She is alert and oriented to person, place, and time.   Psychiatric:         Mood and Affect: Mood normal.         Behavior: Behavior normal.         ASSESSMENT:       ICD-10-CM ICD-9-CM    1. Well woman exam with routine gynecological exam  Z01.419 V72.31       2. Urinary frequency  R35.0 788.41 Urine culture      3. MARY JANE (stress urinary incontinence, female)  N39.3 625.6       4. Vaginal dryness  N89.8 625.8 estradioL (ESTRACE) 0.01 % (0.1 mg/gram) vaginal cream             Plan:      Rosina ORTIZ was seen today for well woman.    Diagnoses and all orders for this visit:    Well woman exam with routine gynecological exam   Pap UTD per ACS guidelines    Urinary frequency  -     Urine culture    MARY JANE (stress urinary incontinence, female)   UCx   Discussed behavioral, Leva, pelvic floor PT, urogyn referral   Interested in learning about Leva, Rx form faxed    Vaginal dryness  -     estradioL (ESTRACE) 0.01 % (0.1 mg/gram) vaginal cream; Use pea-sized amount vaginally nightly for 2 weeks, then 2x/week      Follow up for WWE, or sooner as needed.       Berta Duncan

## 2022-11-04 PROBLEM — N39.3 SUI (STRESS URINARY INCONTINENCE, FEMALE): Status: ACTIVE | Noted: 2022-11-04

## 2022-11-04 LAB — BACTERIA UR CULT: NORMAL

## 2022-11-04 RX ORDER — ESTRADIOL 0.1 MG/G
CREAM VAGINAL
Qty: 42.5 G | Refills: 1 | Status: SHIPPED | OUTPATIENT
Start: 2022-11-04 | End: 2023-04-11

## 2023-03-01 ENCOUNTER — PATIENT MESSAGE (OUTPATIENT)
Dept: PRIMARY CARE CLINIC | Facility: CLINIC | Age: 58
End: 2023-03-01
Payer: OTHER GOVERNMENT

## 2023-03-01 DIAGNOSIS — F41.8 SITUATIONAL ANXIETY: Primary | ICD-10-CM

## 2023-03-01 DIAGNOSIS — F32.9 REACTIVE DEPRESSION: ICD-10-CM

## 2023-03-02 ENCOUNTER — TELEPHONE (OUTPATIENT)
Dept: BEHAVIORAL HEALTH | Facility: CLINIC | Age: 58
End: 2023-03-02
Payer: OTHER GOVERNMENT

## 2023-03-02 ENCOUNTER — PATIENT MESSAGE (OUTPATIENT)
Dept: BEHAVIORAL HEALTH | Facility: CLINIC | Age: 58
End: 2023-03-02
Payer: OTHER GOVERNMENT

## 2023-03-02 NOTE — PROGRESS NOTES
Behavioral Health Community Health Worker  Initial Assessment  Completed by:  Lia Resendiz     Date:  3/2/2023    Patient Enrollment in Behavioral Health Program:  Patient verbalized understanding of Behavioral Health Integration services to include:  Patient understands that CHW, LCSW, PharmD and consulting Psychiatrist are members of the care team working collaboratively with his/her primary care provider: Yes  Patient understands that activation of their [facility] patient portal account is required for accessing the full scope of team services: Yes  Patient understands that some counseling sessions may occur via video: Yes  Clinic visits with the psychiatrist may be subject to a co-pay based on your insurance: Yes  Patient consents to enroll in BHI program: Yes    Assessments     Single Item Health Literacy Scale:  How often do you need to have someone help you read instructions, pamphlets or other written material from your doctor or pharmacy?: Never    Promis 10:  Promis 10 Responses  In general, would you say your health is: Good  In general, would you say your quality of life is: Good  In general, how would you rate your physical health?: Good  In general, how would you rate your mental health, including your mood and your ability to think?: Fair  In general, how would you rate your satisfaction with your social activities and relationships?: Good  In general, please rate how well you carry out your usual social activities and roles. (This includes activities at home, at work and in your community, and responsibilities as a parent, child, spouse, employee, friend, etc.): Good  To what extent are you able to carry out your everyday physical activities such as walking, climbing stairs, carrying groceries, or moving a chair? : Completely  How often have you been bothered by emotional problems such as feeling anxious, depressed or irritable?: Often  In the past 7 days, how would you rate your fatigue on  "average?: Moderate  In the past 7 days, on a scale of 0 to 10 (where 0 is no pain and 10 is the worst pain imaginable) how would you rate your pain on average?: 1  Global Physical Health: 12  Global Mental health Score: 12    Depression PHQ:  PHQ9 3/2/2023   Total Score 18         Generalized Anxiety Disorder 7-Item Scale:  GAD7 3/2/2023   1. Feeling nervous, anxious, or on edge? 3   2. Not being able to stop or control worrying? 3   3. Worrying too much about different things? 3   4. Trouble relaxing? 2   5. Being so restless that it is hard to sit still? 2   6. Becoming easily annoyed or irritable? 3   7. Feeling afraid as if something awful might happen? 3   8. If you checked off any problems, how difficult have these problems made it for you to do your work, take care of things at home, or get along with other people? 1   FRANCI-7 Score 19       History     Social History     Socioeconomic History    Marital status:    Tobacco Use    Smoking status: Former    Smokeless tobacco: Never   Substance and Sexual Activity    Alcohol use: Yes     Comment: occasionally    Drug use: No    Sexual activity: Yes     Partners: Male     Birth control/protection: None       Call Summary     Patient was referred to the BHI (Non-opioid) program by Primary Care Provider, Dr. Thomas Gill.  CHW contacted Rosina ANGEL Koenig who reports depression and anxiety that limits her activities of daily living (ADLs).   Patient scored "18" on the PHQ9 and "19" on the FRANCI 7. Based on these scores patient is eligible for the Behavioral Health Integration (Non-opioid) Program. CHW completed the intake and scheduled an office appointment for patient with Lucinda Romano, PhD on 3/23/23 at 10:30am.          "

## 2023-03-15 ENCOUNTER — TELEPHONE (OUTPATIENT)
Dept: BEHAVIORAL HEALTH | Facility: CLINIC | Age: 58
End: 2023-03-15
Payer: OTHER GOVERNMENT

## 2023-03-15 NOTE — PROGRESS NOTES
CHW reached out to pt to offer visit on 3/16/23. Pt is headed out of town now. Can't be seen on 3/16.

## 2023-03-20 ENCOUNTER — TELEPHONE (OUTPATIENT)
Dept: BEHAVIORAL HEALTH | Facility: CLINIC | Age: 58
End: 2023-03-20
Payer: OTHER GOVERNMENT

## 2023-03-20 NOTE — PROGRESS NOTES
CHW reached out to pt to see if she can be seen virtually by Dr. Romano on 3/23/23. Pt stated she needs to be seen in the office due to the surroundings in her home.

## 2023-03-23 ENCOUNTER — OFFICE VISIT (OUTPATIENT)
Dept: BEHAVIORAL HEALTH | Facility: CLINIC | Age: 58
End: 2023-03-23
Payer: OTHER GOVERNMENT

## 2023-03-23 DIAGNOSIS — F41.8 SITUATIONAL ANXIETY: ICD-10-CM

## 2023-03-23 DIAGNOSIS — F32.9 REACTIVE DEPRESSION: ICD-10-CM

## 2023-03-23 DIAGNOSIS — F43.29 ADJUSTMENT DISORDER WITH MIXED EMOTIONAL FEATURES: Primary | ICD-10-CM

## 2023-03-23 PROCEDURE — 99999 PR PBB SHADOW E&M-EST. PATIENT-LVL I: CPT | Mod: PBBFAC,,, | Performed by: PSYCHOLOGIST

## 2023-03-23 PROCEDURE — 90791 PSYCH DIAGNOSTIC EVALUATION: CPT | Mod: S$GLB,,, | Performed by: PSYCHOLOGIST

## 2023-03-23 PROCEDURE — 99999 PR PBB SHADOW E&M-EST. PATIENT-LVL I: ICD-10-PCS | Mod: PBBFAC,,, | Performed by: PSYCHOLOGIST

## 2023-03-23 PROCEDURE — 90791 PR PSYCHIATRIC DIAGNOSTIC EVALUATION: ICD-10-PCS | Mod: S$GLB,,, | Performed by: PSYCHOLOGIST
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
## 2023-04-11 ENCOUNTER — OFFICE VISIT (OUTPATIENT)
Dept: PRIMARY CARE CLINIC | Facility: CLINIC | Age: 58
End: 2023-04-11
Payer: OTHER GOVERNMENT

## 2023-04-11 VITALS
HEIGHT: 63 IN | TEMPERATURE: 98 F | RESPIRATION RATE: 18 BRPM | BODY MASS INDEX: 36.98 KG/M2 | WEIGHT: 208.69 LBS | HEART RATE: 62 BPM | DIASTOLIC BLOOD PRESSURE: 80 MMHG | OXYGEN SATURATION: 98 % | SYSTOLIC BLOOD PRESSURE: 116 MMHG

## 2023-04-11 DIAGNOSIS — J20.9 ACUTE BRONCHITIS, UNSPECIFIED ORGANISM: Primary | ICD-10-CM

## 2023-04-11 DIAGNOSIS — E78.5 HYPERLIPIDEMIA, UNSPECIFIED HYPERLIPIDEMIA TYPE: ICD-10-CM

## 2023-04-11 DIAGNOSIS — E11.69 TYPE 2 DIABETES MELLITUS WITH HYPERLIPIDEMIA: ICD-10-CM

## 2023-04-11 DIAGNOSIS — E78.5 TYPE 2 DIABETES MELLITUS WITH HYPERLIPIDEMIA: ICD-10-CM

## 2023-04-11 DIAGNOSIS — E66.01 SEVERE OBESITY (BMI 35.0-39.9) WITH COMORBIDITY: ICD-10-CM

## 2023-04-11 LAB
CTP QC/QA: YES
SARS-COV-2 RDRP RESP QL NAA+PROBE: NEGATIVE

## 2023-04-11 PROCEDURE — 87635 SARS-COV-2 COVID-19 AMP PRB: CPT | Mod: QW,S$GLB,, | Performed by: FAMILY MEDICINE

## 2023-04-11 PROCEDURE — 99999 PR PBB SHADOW E&M-EST. PATIENT-LVL IV: CPT | Mod: PBBFAC,,, | Performed by: FAMILY MEDICINE

## 2023-04-11 PROCEDURE — 87635: ICD-10-PCS | Mod: QW,S$GLB,, | Performed by: FAMILY MEDICINE

## 2023-04-11 PROCEDURE — 99214 OFFICE O/P EST MOD 30 MIN: CPT | Mod: S$GLB,,, | Performed by: FAMILY MEDICINE

## 2023-04-11 PROCEDURE — 99214 PR OFFICE/OUTPT VISIT, EST, LEVL IV, 30-39 MIN: ICD-10-PCS | Mod: S$GLB,,, | Performed by: FAMILY MEDICINE

## 2023-04-11 PROCEDURE — 99999 PR PBB SHADOW E&M-EST. PATIENT-LVL IV: ICD-10-PCS | Mod: PBBFAC,,, | Performed by: FAMILY MEDICINE

## 2023-04-11 NOTE — PROGRESS NOTES
"Subjective:       Patient ID: Rosina Koenig is a 58 y.o. female.    Chief Complaint: Follow-up (6 month follow up) and Cough (Started on 4/10)    DM - well controlled, compliant with meds  HLD - lipid panel looks good, no medication SE    Cough  This is a new problem. The current episode started yesterday. The problem has been unchanged. The problem occurs every few minutes. The cough is Productive of sputum. Associated symptoms include headaches, myalgias, postnasal drip, a sore throat and wheezing. Pertinent negatives include no chills, fever, hemoptysis or shortness of breath. She has tried nothing for the symptoms. Her past medical history is significant for bronchitis.   Review of Systems   Constitutional:  Negative for chills and fever.   HENT:  Positive for postnasal drip and sore throat.    Respiratory:  Positive for cough and wheezing. Negative for hemoptysis and shortness of breath.    Gastrointestinal:  Negative for nausea and vomiting.   Musculoskeletal:  Positive for myalgias.   Neurological:  Positive for headaches.     Objective:      Vitals:    04/11/23 0821   BP: 116/80   BP Location: Right arm   Patient Position: Sitting   BP Method: Medium (Manual)   Pulse: 62   Resp: 18   Temp: 98.2 °F (36.8 °C)   TempSrc: Temporal   SpO2: 98%   Weight: 94.7 kg (208 lb 10.7 oz)   Height: 5' 3" (1.6 m)     Physical Exam  Vitals and nursing note reviewed.   Constitutional:       General: She is not in acute distress.     Appearance: Normal appearance. She is well-developed.   HENT:      Head: Normocephalic and atraumatic.      Right Ear: Tympanic membrane normal.      Left Ear: Tympanic membrane normal.      Mouth/Throat:      Mouth: Mucous membranes are moist.      Pharynx: Oropharynx is clear.   Cardiovascular:      Rate and Rhythm: Normal rate and regular rhythm.      Heart sounds: Normal heart sounds.   Pulmonary:      Effort: Pulmonary effort is normal.      Breath sounds: Wheezing (end-expiratory) " present.   Musculoskeletal:      Cervical back: Neck supple.      Right lower leg: No edema.      Left lower leg: No edema.   Skin:     General: Skin is warm and dry.   Neurological:      Mental Status: She is alert and oriented to person, place, and time.   Psychiatric:         Mood and Affect: Mood normal.         Behavior: Behavior normal.       Lab Results   Component Value Date    WBC 7.32 04/05/2023    HGB 11.7 (L) 04/05/2023    HCT 39.2 04/05/2023     04/05/2023    CHOL 117 (L) 04/05/2023    TRIG 78 04/05/2023    HDL 47 04/05/2023    ALT 11 04/05/2023    AST 12 04/05/2023     04/05/2023    K 4.1 04/05/2023     04/05/2023    CREATININE 0.8 04/05/2023    BUN 15 04/05/2023    CO2 25 04/05/2023    TSH 1.75 02/06/2019    HGBA1C 6.2 (H) 04/05/2023      Assessment:       1. Acute bronchitis, unspecified organism    2. Type 2 diabetes mellitus with hyperlipidemia    3. Severe obesity (BMI 35.0-39.9) with comorbidity    4. Hyperlipidemia, unspecified hyperlipidemia type        Plan:       Acute bronchitis, unspecified organism  -     POCT COVID-19 Rapid Screening  Likely viral process, continue albuterol PRN. F/U for any new or worsening symptoms  Type 2 diabetes mellitus with hyperlipidemia  -     Comprehensive Metabolic Panel; Future; Expected date: 10/11/2023  -     Hemoglobin A1C; Future; Expected date: 10/11/2023  Well controlled, continue current regimen  Severe obesity (BMI 35.0-39.9) with comorbidity  Low carb diet, exercise  Hyperlipidemia, unspecified hyperlipidemia type  Continue statin    Medication List with Changes/Refills   Current Medications    ALBUTEROL (PROAIR HFA) 90 MCG/ACTUATION INHALER    Inhale 2 puffs into the lungs every 4 (four) hours as needed for Wheezing or Shortness of Breath. Rescue    ALBUTEROL (PROVENTIL) 2.5 MG /3 ML (0.083 %) NEBULIZER SOLUTION    Take 3 mLs (2.5 mg total) by nebulization every 4 (four) hours as needed for Wheezing or Shortness of Breath. Rescue     ASPIRIN (ECOTRIN) 81 MG EC TABLET    Take 81 mg by mouth once daily.    ATORVASTATIN (LIPITOR) 10 MG TABLET    Take 1 tablet (10 mg total) by mouth once daily.    CALCIUM-VITAMIN D 600 MG(1,500MG) -400 UNIT TAB    Take by mouth.    CETIRIZINE (ZYRTEC) 10 MG TABLET    Take 10 mg by mouth once daily.    METFORMIN (GLUCOPHAGE-XR) 750 MG ER 24HR TABLET    Take 2 tablets (1,500 mg total) by mouth daily with breakfast.   Discontinued Medications    ESTRADIOL (ESTRACE) 0.01 % (0.1 MG/GRAM) VAGINAL CREAM    Use pea-sized amount vaginally nightly for 2 weeks, then 2x/week    FLUTICASONE (FLONASE) 50 MCG/ACTUATION NASAL SPRAY    2 sprays by Each Nare route daily as needed. 2 Spray, Suspension Nasal Every day

## 2023-04-26 ENCOUNTER — PATIENT MESSAGE (OUTPATIENT)
Dept: BEHAVIORAL HEALTH | Facility: CLINIC | Age: 58
End: 2023-04-26
Payer: OTHER GOVERNMENT

## 2023-04-26 ENCOUNTER — TELEPHONE (OUTPATIENT)
Dept: BEHAVIORAL HEALTH | Facility: CLINIC | Age: 58
End: 2023-04-26
Payer: OTHER GOVERNMENT

## 2023-04-26 NOTE — PROGRESS NOTES
CHW reached out to pt to see if she would like to continue BH treatment with another therapist, No answer, LVM. Sent pt portal message to pls call.

## 2023-05-06 ENCOUNTER — PATIENT MESSAGE (OUTPATIENT)
Dept: PRIMARY CARE CLINIC | Facility: CLINIC | Age: 58
End: 2023-05-06
Payer: OTHER GOVERNMENT

## 2023-05-06 ENCOUNTER — PATIENT MESSAGE (OUTPATIENT)
Dept: BEHAVIORAL HEALTH | Facility: CLINIC | Age: 58
End: 2023-05-06
Payer: OTHER GOVERNMENT

## 2023-05-08 ENCOUNTER — TELEPHONE (OUTPATIENT)
Dept: BEHAVIORAL HEALTH | Facility: CLINIC | Age: 58
End: 2023-05-08
Payer: OTHER GOVERNMENT

## 2023-05-08 DIAGNOSIS — E78.5 HYPERLIPIDEMIA, UNSPECIFIED HYPERLIPIDEMIA TYPE: ICD-10-CM

## 2023-05-08 RX ORDER — ATORVASTATIN CALCIUM 10 MG/1
10 TABLET, FILM COATED ORAL DAILY
Qty: 90 TABLET | Refills: 3 | Status: SHIPPED | OUTPATIENT
Start: 2023-05-08

## 2023-05-08 NOTE — TELEPHONE ENCOUNTER
No care due was identified.  [facility] Embedded Care Due Messages. Reference number: [id number].   5/08/2023 10:01:15 AM CDT

## 2023-05-08 NOTE — PROGRESS NOTES
Behavioral Health Community Health Worker  Initial Assessment  Completed by:  Tony Calabrese    Date:  5/8/2023    Patient Enrollment in Behavioral Health Program:       Assessments     Single Item Health Literacy Scale:       Promis 10:       Depression PHQ:  PHQ9 5/8/2023   Total Score 14         Generalized Anxiety Disorder 7-Item Scale:  GAD7 5/8/2023   1. Feeling nervous, anxious, or on edge? 2   2. Not being able to stop or control worrying? 3   3. Worrying too much about different things? 3   4. Trouble relaxing? 2   5. Being so restless that it is hard to sit still? 1   6. Becoming easily annoyed or irritable? 0   7. Feeling afraid as if something awful might happen? 3   8. If you checked off any problems, how difficult have these problems made it for you to do your work, take care of things at home, or get along with other people? 1   FRANCI-7 Score 14       History     Social History     Socioeconomic History    Marital status:    Tobacco Use    Smoking status: Former    Smokeless tobacco: Never   Substance and Sexual Activity    Alcohol use: Yes     Comment: occasionally    Drug use: No    Sexual activity: Yes     Partners: Male     Birth control/protection: None       Call Summary     Updated assessment from being rescheduled to another therapist Dr Freda Quiroga  LPC from Dr Romano.

## 2023-05-09 NOTE — TELEPHONE ENCOUNTER
Refill Decision Note   Rosina Koeing  is requesting a refill authorization.  Brief Assessment and Rationale for Refill:  Approve     Medication Therapy Plan:         Comments:     Note composed:8:18 PM 05/08/2023             Appointments     Last Visit   4/11/2023 Thomas Gill MD   Next Visit   10/11/2023 Thomas Gill MD

## 2023-05-22 ENCOUNTER — OFFICE VISIT (OUTPATIENT)
Dept: PRIMARY CARE CLINIC | Facility: CLINIC | Age: 58
End: 2023-05-22
Payer: OTHER GOVERNMENT

## 2023-05-22 VITALS
WEIGHT: 201.38 LBS | HEIGHT: 63 IN | SYSTOLIC BLOOD PRESSURE: 116 MMHG | RESPIRATION RATE: 18 BRPM | BODY MASS INDEX: 35.68 KG/M2 | HEART RATE: 61 BPM | DIASTOLIC BLOOD PRESSURE: 76 MMHG | OXYGEN SATURATION: 98 % | TEMPERATURE: 98 F

## 2023-05-22 DIAGNOSIS — E78.5 TYPE 2 DIABETES MELLITUS WITH HYPERLIPIDEMIA: ICD-10-CM

## 2023-05-22 DIAGNOSIS — E11.69 TYPE 2 DIABETES MELLITUS WITH HYPERLIPIDEMIA: ICD-10-CM

## 2023-05-22 DIAGNOSIS — E66.01 SEVERE OBESITY (BMI 35.0-39.9) WITH COMORBIDITY: ICD-10-CM

## 2023-05-22 DIAGNOSIS — Z01.818 PREOPERATIVE EXAMINATION: Primary | ICD-10-CM

## 2023-05-22 DIAGNOSIS — L98.7 REDUNDANT SKIN OF ABDOMEN: ICD-10-CM

## 2023-05-22 PROCEDURE — 93005 EKG 12-LEAD: ICD-10-PCS | Mod: S$GLB,,, | Performed by: FAMILY MEDICINE

## 2023-05-22 PROCEDURE — 93010 EKG 12-LEAD: ICD-10-PCS | Mod: S$GLB,,, | Performed by: INTERNAL MEDICINE

## 2023-05-22 PROCEDURE — 99214 PR OFFICE/OUTPT VISIT, EST, LEVL IV, 30-39 MIN: ICD-10-PCS | Mod: S$GLB,,, | Performed by: FAMILY MEDICINE

## 2023-05-22 PROCEDURE — 93010 ELECTROCARDIOGRAM REPORT: CPT | Mod: S$GLB,,, | Performed by: INTERNAL MEDICINE

## 2023-05-22 PROCEDURE — 99999 PR PBB SHADOW E&M-EST. PATIENT-LVL IV: CPT | Mod: PBBFAC,,, | Performed by: FAMILY MEDICINE

## 2023-05-22 PROCEDURE — 93005 ELECTROCARDIOGRAM TRACING: CPT | Mod: S$GLB,,, | Performed by: FAMILY MEDICINE

## 2023-05-22 PROCEDURE — 99214 OFFICE O/P EST MOD 30 MIN: CPT | Mod: S$GLB,,, | Performed by: FAMILY MEDICINE

## 2023-05-22 PROCEDURE — 99999 PR PBB SHADOW E&M-EST. PATIENT-LVL IV: ICD-10-PCS | Mod: PBBFAC,,, | Performed by: FAMILY MEDICINE

## 2023-05-22 NOTE — PROGRESS NOTES
"Subjective:       Patient ID: Rosina Koenig is a 58 y.o. female.    Chief Complaint: Surgery Clearance    Scheduled for abdominoplasty with liposuction on 6/28. No previous surgical complications other than post-op urinary retention. No recent illness or injury.    Review of Systems   Constitutional:  Negative for chills, fatigue and fever.   HENT:  Negative for congestion.    Eyes:  Negative for visual disturbance.   Respiratory:  Negative for cough and shortness of breath.    Cardiovascular:  Negative for chest pain.   Gastrointestinal:  Negative for abdominal pain, nausea and vomiting.   Genitourinary:  Negative for difficulty urinating.   Musculoskeletal:  Negative for arthralgias.   Skin:  Negative for rash.   Allergic/Immunologic: Negative for immunocompromised state.   Neurological:  Negative for dizziness.   Hematological:  Does not bruise/bleed easily.   Psychiatric/Behavioral:  Negative for sleep disturbance.      Objective:      Vitals:    05/22/23 1106   BP: 116/76   BP Location: Right arm   Patient Position: Sitting   BP Method: Large (Manual)   Pulse: 61   Resp: 18   Temp: 98.1 °F (36.7 °C)   TempSrc: Temporal   SpO2: 98%   Weight: 91.3 kg (201 lb 6.2 oz)   Height: 5' 3" (1.6 m)     Physical Exam  Vitals and nursing note reviewed.   Constitutional:       General: She is not in acute distress.     Appearance: Normal appearance. She is well-developed.   HENT:      Head: Normocephalic and atraumatic.      Mouth/Throat:      Mouth: Mucous membranes are moist.      Pharynx: Oropharynx is clear.      Comments: Mallampati class 1  Cardiovascular:      Rate and Rhythm: Normal rate and regular rhythm.      Heart sounds: Normal heart sounds.   Pulmonary:      Effort: Pulmonary effort is normal.      Breath sounds: Normal breath sounds.   Abdominal:      General: Bowel sounds are normal. There is no distension.      Tenderness: There is no abdominal tenderness.   Musculoskeletal:      Right lower leg: No " edema.      Left lower leg: No edema.   Skin:     General: Skin is warm and dry.   Neurological:      Mental Status: She is alert and oriented to person, place, and time.   Psychiatric:         Mood and Affect: Mood normal.         Behavior: Behavior normal.       Lab Results   Component Value Date    WBC 8.37 05/22/2023    HGB 12.2 05/22/2023    HCT 40.7 05/22/2023     05/22/2023    CHOL 117 (L) 04/05/2023    TRIG 78 04/05/2023    HDL 47 04/05/2023    ALT 16 05/22/2023    AST 17 05/22/2023     05/22/2023    K 4.2 05/22/2023     05/22/2023    CREATININE 0.9 05/22/2023    BUN 13 05/22/2023    CO2 23 05/22/2023    TSH 1.75 02/06/2019    HGBA1C 6.1 (H) 05/22/2023    X-Ray Chest PA And Lateral  Order: [id number]  Status: Final result     Visible to patient: Yes (seen)     Next appt: 10/11/2023 at 07:45 AM in Primary Care (Thomas Gill MD)     Dx: Preoperative examination     0 Result Notes  Details    Reading Physician Reading Date Result Priority   Naresh Kendall MD  [phone] 5/22/2023 Routine     Narrative & Impression  EXAMINATION:  XR CHEST PA AND LATERAL     CLINICAL HISTORY:  Encounter for other preprocedural examination     TECHNIQUE:  PA and lateral views of the chest were performed.     COMPARISON:  4/26/19     FINDINGS:  The lungs are clear, with normal appearance of pulmonary vasculature.No pleural effusion.No pneumothorax.     The cardiac silhouette is normal in size. The hilar and mediastinal contours are unremarkable.     Bones are intact.     Impression:     No acute abnormality.        Electronically signed by: Naresh Kendall MD  Date:                                            05/22/2023  Time:                                           11:54   EKG 12-lead  Order: [id number]  Status: Final result     Visible to patient: Yes (seen)     Next appt: 10/11/2023 at 07:45 AM in Primary Care (Thomas Gill MD)     Dx: Preoperative examination     0 Result Notes      Narrative  Performed by: GEMUSE  Test Reason : Z01.818,     Vent. Rate : 043 BPM     Atrial Rate : 043 BPM      P-R Int : 130 ms          QRS Dur : 082 ms       QT Int : 444 ms       P-R-T Axes : 061 067 047 degrees      QTc Int : 375 ms     Marked sinus bradycardia   Nonspecific T wave abnormality   Abnormal ECG   When compared with ECG of 26-APR-2019 08:01,   Nonspecific T wave abnormality now evident in Anterior leads   Confirmed by Bhavik Cisneros MD ([id number]) on 5/22/2023 7:05:50 PM            Assessment:       1. Preoperative examination    2. Redundant skin of abdomen    3. Type 2 diabetes mellitus with hyperlipidemia    4. Severe obesity (BMI 35.0-39.9) with comorbidity        Plan:       Preoperative examination  -     CBC Auto Differential; Future; Expected date: 05/22/2023  -     Comprehensive Metabolic Panel; Future; Expected date: 05/22/2023  -     Hemoglobin A1C; Future; Expected date: 05/22/2023  -     EKG 12-lead  -     X-Ray Chest PA And Lateral; Future; Expected date: 05/22/2023  Medically cleared for upcoming surgery  Redundant skin of abdomen    Type 2 diabetes mellitus with hyperlipidemia  -     Hemoglobin A1C; Future; Expected date: 05/22/2023  Excellent control  Severe obesity (BMI 35.0-39.9) with comorbidity  Lifestyle modification    Medication List with Changes/Refills   Current Medications    ALBUTEROL (PROAIR HFA) 90 MCG/ACTUATION INHALER    Inhale 2 puffs into the lungs every 4 (four) hours as needed for Wheezing or Shortness of Breath. Rescue    ALBUTEROL (PROVENTIL) 2.5 MG /3 ML (0.083 %) NEBULIZER SOLUTION    Take 3 mLs (2.5 mg total) by nebulization every 4 (four) hours as needed for Wheezing or Shortness of Breath. Rescue    ATORVASTATIN (LIPITOR) 10 MG TABLET    Take 1 tablet (10 mg total) by mouth once daily.    CALCIUM-VITAMIN D 600 MG(1,500MG) -400 UNIT TAB    Take by mouth.    CETIRIZINE (ZYRTEC) 10 MG TABLET    Take 10 mg by mouth once daily.    METFORMIN (GLUCOPHAGE-XR) 750  MG ER 24HR TABLET    Take 2 tablets (1,500 mg total) by mouth daily with breakfast.   Discontinued Medications    ASPIRIN (ECOTRIN) 81 MG EC TABLET    Take 81 mg by mouth once daily.

## 2023-08-14 ENCOUNTER — PATIENT MESSAGE (OUTPATIENT)
Dept: ADMINISTRATIVE | Facility: HOSPITAL | Age: 58
End: 2023-08-14
Payer: OTHER GOVERNMENT

## 2023-08-15 ENCOUNTER — TELEPHONE (OUTPATIENT)
Dept: ADMINISTRATIVE | Facility: HOSPITAL | Age: 58
End: 2023-08-15
Payer: OTHER GOVERNMENT

## 2023-08-15 ENCOUNTER — PATIENT OUTREACH (OUTPATIENT)
Dept: ADMINISTRATIVE | Facility: HOSPITAL | Age: 58
End: 2023-08-15
Payer: OTHER GOVERNMENT

## 2023-08-15 ENCOUNTER — TELEPHONE (OUTPATIENT)
Dept: OPHTHALMOLOGY | Facility: CLINIC | Age: 58
End: 2023-08-15
Payer: OTHER GOVERNMENT

## 2023-08-15 DIAGNOSIS — E78.5 TYPE 2 DIABETES MELLITUS WITH HYPERLIPIDEMIA: Primary | ICD-10-CM

## 2023-08-15 DIAGNOSIS — E11.69 TYPE 2 DIABETES MELLITUS WITH HYPERLIPIDEMIA: Primary | ICD-10-CM

## 2023-08-15 NOTE — PROGRESS NOTES
Health Maintenance Due   Topic Date Due    Eye Exam  05/10/2023    Foot Exam  10/11/2023    Mammogram  11/01/2023     Foot exam- patient has an appointment on 10/11/2023 with PCP, added to appointment notes.     Immunizations - reviewed and updated   Care Everywhere - triggered   Care Teams - updated   Outreach - Spoke with patient in regards to overdue HM. Verbalized to patient that she is not due for her mammogram until November. Verbalized that I will call her around the end of October to schedule.   Eye Exam - Patient wanted to schedule with an [facility] ophthalmologist. Eye exam done last year with [facility]. Verbalized to patient that I will send a referral to PCP to review and sign and then someone will call patient to schedule. Patient verbalized understanding.

## 2023-08-15 NOTE — TELEPHONE ENCOUNTER
----- Message from Anni Valladares sent at 8/15/2023  3:24 PM CDT -----  This is a routine diabetic eye exam.  She can see optometry.  ----- Message -----  From: Mabel Marquis  Sent: 8/15/2023   1:50 PM CDT  To: [facility] Staff; #      ----- Message -----  From: Huyen Yee  Sent: 8/15/2023   1:49 PM CDT  To: [facility] Ophthalmology Clinical Support; #    Type: Appointment Request    Name of Caller: CHANTAL SYED [[id number]]  When is the first available appointment? Do not have access  Reason for Visit:  Type 2 diabetes mellitus with hyperlipidemia [E11.69, E78.5]  Best Call Back Number: [phone]  Additional Information: New patient, has internal referral. Requests Irma.

## 2023-08-15 NOTE — TELEPHONE ENCOUNTER
Spoke with patient in regards to overdue eye exam. Patient requesting to see an [facility] ophthalmologist. Referral pended.

## 2023-08-21 ENCOUNTER — OFFICE VISIT (OUTPATIENT)
Dept: OPTOMETRY | Facility: CLINIC | Age: 58
End: 2023-08-21
Payer: OTHER GOVERNMENT

## 2023-08-21 DIAGNOSIS — H25.13 NUCLEAR SCLEROSIS OF BOTH EYES: ICD-10-CM

## 2023-08-21 DIAGNOSIS — E11.9 DIABETES MELLITUS WITHOUT COMPLICATION: Primary | ICD-10-CM

## 2023-08-21 PROCEDURE — 99999 PR PBB SHADOW E&M-EST. PATIENT-LVL III: CPT | Mod: PBBFAC,,, | Performed by: OPTOMETRIST

## 2023-08-21 PROCEDURE — 99999 PR PBB SHADOW E&M-EST. PATIENT-LVL III: ICD-10-PCS | Mod: PBBFAC,,, | Performed by: OPTOMETRIST

## 2023-08-21 PROCEDURE — 92004 PR EYE EXAM, NEW PATIENT,COMPREHESV: ICD-10-PCS | Mod: S$GLB,,, | Performed by: OPTOMETRIST

## 2023-08-21 PROCEDURE — 92004 COMPRE OPH EXAM NEW PT 1/>: CPT | Mod: S$GLB,,, | Performed by: OPTOMETRIST

## 2023-08-21 RX ORDER — BENZONATATE 100 MG/1
CAPSULE ORAL
COMMUNITY
End: 2023-08-30

## 2023-08-21 RX ORDER — CEPHALEXIN 500 MG/1
500 CAPSULE ORAL 3 TIMES DAILY
COMMUNITY
Start: 2023-06-15 | End: 2023-08-30

## 2023-08-21 RX ORDER — HYDROCODONE BITARTRATE AND ACETAMINOPHEN 10; 325 MG/1; MG/1
1 TABLET ORAL EVERY 4 HOURS PRN
COMMUNITY
Start: 2023-06-15 | End: 2023-08-30

## 2023-08-21 RX ORDER — HYDROCODONE BITARTRATE AND ACETAMINOPHEN 5; 325 MG/1; MG/1
1 TABLET ORAL EVERY 4 HOURS PRN
COMMUNITY
Start: 2023-07-03 | End: 2023-08-30

## 2023-08-21 RX ORDER — SCOLOPAMINE TRANSDERMAL SYSTEM 1 MG/1
PATCH, EXTENDED RELEASE TRANSDERMAL
COMMUNITY
Start: 2023-06-15 | End: 2023-08-30

## 2023-08-21 RX ORDER — ONDANSETRON 8 MG/1
TABLET, ORALLY DISINTEGRATING ORAL
COMMUNITY
Start: 2023-06-15 | End: 2023-08-30

## 2023-08-21 RX ORDER — APREPITANT 40 MG/1
CAPSULE ORAL
COMMUNITY
Start: 2023-06-16 | End: 2023-08-30

## 2023-08-21 NOTE — PROGRESS NOTES
HPI    Pt is here today for diabetic eye exam. Pt states that she has no ocular   concerns at this time and is okay with updating glasses rx if needed.   DLS: 2 Years Ago  (-)Flashes (+)Floaters occasionally (-)Diplopia (-)Headaches (+)Itching   seasonal allergy related (+)Tearing seasonal allergy related (-)Burning   (-)Dryness (-)Photophobia (+)Glare while driving at night   Past Eye Sx: No past ocular sx  Eye Meds: No gtts    Last edited by Deanna Meza, OD on 8/21/2023  1:47 PM.            Assessment /Plan     For exam results, see Encounter Report.    Diabetes mellitus without complication  -     Ambulatory referral/consult to Ophthalmology    Nuclear sclerosis of both eyes      1. No retinopathy noted today.  Continued control with primary care physician and annual comprehensive eye exam.     2. Educated pt on findings. Not visually significant. No need for removal at this time. Monitor yearly.      RTC in 1 year for annual eye exam unless changes noted sooner.

## 2023-08-30 ENCOUNTER — OFFICE VISIT (OUTPATIENT)
Dept: PRIMARY CARE CLINIC | Facility: CLINIC | Age: 58
End: 2023-08-30
Payer: OTHER GOVERNMENT

## 2023-08-30 VITALS
WEIGHT: 174.63 LBS | RESPIRATION RATE: 16 BRPM | HEART RATE: 56 BPM | SYSTOLIC BLOOD PRESSURE: 130 MMHG | HEIGHT: 63 IN | OXYGEN SATURATION: 98 % | DIASTOLIC BLOOD PRESSURE: 64 MMHG | TEMPERATURE: 98 F | BODY MASS INDEX: 30.94 KG/M2

## 2023-08-30 DIAGNOSIS — R11.0 NAUSEA: Primary | ICD-10-CM

## 2023-08-30 PROCEDURE — 99999 PR PBB SHADOW E&M-EST. PATIENT-LVL IV: CPT | Mod: PBBFAC,,, | Performed by: STUDENT IN AN ORGANIZED HEALTH CARE EDUCATION/TRAINING PROGRAM

## 2023-08-30 PROCEDURE — 99999 PR PBB SHADOW E&M-EST. PATIENT-LVL IV: ICD-10-PCS | Mod: PBBFAC,,, | Performed by: STUDENT IN AN ORGANIZED HEALTH CARE EDUCATION/TRAINING PROGRAM

## 2023-08-30 PROCEDURE — 99214 PR OFFICE/OUTPT VISIT, EST, LEVL IV, 30-39 MIN: ICD-10-PCS | Mod: S$GLB,,, | Performed by: STUDENT IN AN ORGANIZED HEALTH CARE EDUCATION/TRAINING PROGRAM

## 2023-08-30 PROCEDURE — 99214 OFFICE O/P EST MOD 30 MIN: CPT | Mod: S$GLB,,, | Performed by: STUDENT IN AN ORGANIZED HEALTH CARE EDUCATION/TRAINING PROGRAM

## 2023-08-30 RX ORDER — PANTOPRAZOLE SODIUM 40 MG/1
40 TABLET, DELAYED RELEASE ORAL DAILY
Qty: 30 TABLET | Refills: 0 | Status: SHIPPED | OUTPATIENT
Start: 2023-08-30 | End: 2023-10-11

## 2023-08-30 RX ORDER — ONDANSETRON 4 MG/1
4 TABLET, FILM COATED ORAL 2 TIMES DAILY
Qty: 12 TABLET | Refills: 0 | Status: SHIPPED | OUTPATIENT
Start: 2023-08-30 | End: 2023-10-11

## 2023-08-30 NOTE — PROGRESS NOTES
"Subjective:       Patient ID: Rosina Koenig is a 58 y.o. female.    Chief Complaint: Emesis and Nausea      HPI:  58 y.o. female presents to [facility] with complaints of nausea and emesis    Patient reports that she has been having nausea and vomiting since starting antibiotics following lancing of abdominal incision point from plastic surgeon to investigate for possible infection. Reports has since stopped taking the antibiotic. Stopped pain medication and stopped antibiotic since onset.    Had tummy tuck late june and reports has recently been taking ibuprofen for associated pain. Has recently stopped the medication. No longer taking ibuprofen.    Not eating greek yogurt daily. Was on ciprofloxacin for incision. Patient is wearing surgical girdle. No redness from abdominal lesions. Bloody/clear discharge from lesion that was lanced.    Recent  Raw foods?: No  Eating out?: [employer], cold cuts  Leftovers?: None  Others sick?: No    Last episode of emesis 8/29/2023      Review of Systems   Constitutional:  Negative for chills, diaphoresis, fatigue and fever.   Respiratory:  Negative for cough and shortness of breath.    Cardiovascular:  Negative for chest pain and palpitations.   Gastrointestinal:  Positive for nausea and vomiting (clear, mucus to yellow bile). Negative for abdominal pain and diarrhea.       Objective:      Vitals:    08/30/23 0808   BP: 130/64   BP Location: Left arm   Patient Position: Sitting   BP Method: Medium (Manual)   Pulse: (!) 56   Resp: 16   Temp: 98.3 °F (36.8 °C)   TempSrc: Temporal   SpO2: 98%   Weight: 79.2 kg (174 lb 9.7 oz)   Height: 5' 3" (1.6 m)     Physical Exam  Vitals reviewed.   Constitutional:       General: She is not in acute distress.     Appearance: Normal appearance. She is not ill-appearing.   HENT:      Head: Normocephalic and atraumatic.   Eyes:      General:         Right eye: No discharge.         Left eye: No discharge.      Conjunctiva/sclera: Conjunctivae " "normal.   Cardiovascular:      Rate and Rhythm: Normal rate.   Pulmonary:      Effort: Pulmonary effort is normal.   Musculoskeletal:         General: No deformity.   Skin:     Coloration: Skin is not jaundiced or pale.   Neurological:      General: No focal deficit present.      Mental Status: She is alert and oriented to person, place, and time.   Psychiatric:         Mood and Affect: Mood normal.         Behavior: Behavior normal.             Lab Results   Component Value Date     05/22/2023    K 4.2 05/22/2023     05/22/2023    CO2 23 05/22/2023    BUN 13 05/22/2023    CREATININE 0.9 05/22/2023    ANIONGAP 11 05/22/2023     Lab Results   Component Value Date    HGBA1C 6.1 (H) 05/22/2023     No results found for: "BNP", "BNPTRIAGEBLO"    Lab Results   Component Value Date    WBC 8.37 05/22/2023    HGB 12.2 05/22/2023    HCT 40.7 05/22/2023     05/22/2023    GRAN 5.8 05/22/2023    GRAN 69.8 05/22/2023     Lab Results   Component Value Date    CHOL 117 (L) 04/05/2023    HDL 47 04/05/2023    LDLCALC 54.4 (L) 04/05/2023    TRIG 78 04/05/2023          Current Outpatient Medications:     albuterol (PROAIR HFA) 90 mcg/actuation inhaler, Inhale 2 puffs into the lungs every 4 (four) hours as needed for Wheezing or Shortness of Breath. Rescue, Disp: 18 g, Rfl: 3    albuterol (PROVENTIL) 2.5 mg /3 mL (0.083 %) nebulizer solution, Take 3 mLs (2.5 mg total) by nebulization every 4 (four) hours as needed for Wheezing or Shortness of Breath. Rescue, Disp: 150 mL, Rfl: 3    atorvastatin (LIPITOR) 10 MG tablet, Take 1 tablet (10 mg total) by mouth once daily., Disp: 90 tablet, Rfl: 3    calcium-vitamin D 600 mg(1,500mg) -400 unit Tab, Take by mouth., Disp: , Rfl:     cetirizine (ZYRTEC) 10 MG tablet, Take 10 mg by mouth once daily., Disp: , Rfl:     metFORMIN (GLUCOPHAGE-XR) 750 MG ER 24hr tablet, Take 2 tablets (1,500 mg total) by mouth daily with breakfast., Disp: 180 tablet, Rfl: 3    ondansetron (ZOFRAN) 4 " MG tablet, Take 1 tablet (4 mg total) by mouth 2 (two) times daily., Disp: 12 tablet, Rfl: 0    pantoprazole (PROTONIX) 40 MG tablet, Take 1 tablet (40 mg total) by mouth once daily., Disp: 30 tablet, Rfl: 0        Assessment:       1. Nausea           Plan:       Nausea  -     ondansetron (ZOFRAN) 4 MG tablet; Take 1 tablet (4 mg total) by mouth 2 (two) times daily.  Dispense: 12 tablet; Refill: 0  -     pantoprazole (PROTONIX) 40 MG tablet; Take 1 tablet (40 mg total) by mouth once daily.  Dispense: 30 tablet; Refill: 0  - Continue probiotic following antibiotic use. OK for patient to discontinue antibiotic at this time with no overt signs of infection.    Ming Wilburn MD

## 2023-09-01 ENCOUNTER — HOSPITAL ENCOUNTER (OUTPATIENT)
Dept: WOUND CARE | Facility: HOSPITAL | Age: 58
Discharge: HOME | End: 2023-09-01
Attending: PREVENTIVE MEDICINE
Payer: OTHER GOVERNMENT

## 2023-09-01 VITALS
TEMPERATURE: 98 F | DIASTOLIC BLOOD PRESSURE: 77 MMHG | WEIGHT: 174.63 LBS | SYSTOLIC BLOOD PRESSURE: 139 MMHG | BODY MASS INDEX: 30.94 KG/M2 | HEIGHT: 63 IN | HEART RATE: 49 BPM | RESPIRATION RATE: 18 BRPM

## 2023-09-01 DIAGNOSIS — Z98.890 S/P ABDOMINOPLASTY: ICD-10-CM

## 2023-09-01 DIAGNOSIS — T81.30XA ABDOMINAL WOUND DEHISCENCE, INITIAL ENCOUNTER: Primary | ICD-10-CM

## 2023-09-01 PROBLEM — T81.321A ABDOMINAL WOUND DEHISCENCE: Status: ACTIVE | Noted: 2023-09-01

## 2023-09-01 PROCEDURE — 99204 OFFICE O/P NEW MOD 45 MIN: CPT

## 2023-09-01 NOTE — PROGRESS NOTES
[facility]    Subjective:       Patient ID: Rosina Koenig is a 58 y.o. female.    Chief Complaint: Non-healing Wound Follow Up    New referral for abdominal wounds. Patient s/p abdominoplasty on 6/28/2023 by  in [place] and is here for second opinion. Patient noted with 3 openings along transverse abdominal incision that tract along incision line - deepest is opening on left side that tracts 9cm. Opening also noted to umbilicus that undermines entire circumference. Per patient, plastic surgeon has suggested revision of umbilicus. Patient states she would like to try conservative wound care at this time though would also like the wounds to heal quickly. Advised that surgical option may be best due to tracking and depth of wounds. She has follow up with plastics next week and will discuss revision of both areas. Will pack with silver alginate, return to clinic in 1 week.     Review of Systems   All other systems reviewed and are negative.        Objective:     Vitals:    09/01/23 0806   BP: 139/77   Pulse: (!) 49   Resp: 18   Temp: 98 °F (36.7 °C)         Physical Exam       Altered Skin Integrity 09/01/23 0814 Right Abdomen (Active)   09/01/23 0814   Altered Skin Integrity Present on Admission - Did Patient arrive to the hospital with altered skin?: yes   Side: Right   Orientation:    Location: Abdomen   Wound Number:    Is this injury device related?:    Primary Wound Type:    Description of Altered Skin Integrity:    Ankle-Brachial Index:    Pulses:    Removal Indication and Assessment:    Wound Outcome:    (Retired) Wound Length (cm):    (Retired) Wound Width (cm):    (Retired) Depth (cm):    Wound Description (Comments):    Removal Indications:    Wound Image   09/01/23 0837   Description of Altered Skin Integrity Full thickness tissue loss. Subcutaneous fat may be visible but bone, tendon or muscle are not exposed 09/01/23 0837   Dressing  Appearance Moist drainage 09/01/23 0837   Drainage Amount Moderate 09/01/23 0837   Drainage Characteristics/Odor Serosanguineous 09/01/23 0837   Appearance Red 09/01/23 0837   Red (%), Wound Tissue Color 100 % 09/01/23 0837   Periwound Area Intact 09/01/23 0837   Wound Edges Defined 09/01/23 0837   Wound Length (cm) 0.7 cm 09/01/23 0837   Wound Width (cm) 1.5 cm 09/01/23 0837   Wound Depth (cm) 1 cm 09/01/23 0837   Wound Volume (cm^3) 1.05 cm^3 09/01/23 0837   Wound Surface Area (cm^2) 1.05 cm^2 09/01/23 0837   Tunneling (depth (cm)/location) @3:00 = 4cm 09/01/23 0837   Care Cleansed with:;Sterile normal saline 09/01/23 0837   Dressing Applied;Silver;Calcium alginate;Absorptive Pad 09/01/23 0837   Packing packed with;hydrofiber/alginate 09/01/23 0837   Packing Inserted  1 09/01/23 0837   Dressing Change Due 09/02/23 09/01/23 0837            Altered Skin Integrity 09/01/23 0814 Left Abdomen (Active)   09/01/23 0814   Altered Skin Integrity Present on Admission - Did Patient arrive to the hospital with altered skin?: yes   Side: Left   Orientation:    Location: Abdomen   Wound Number:    Is this injury device related?:    Primary Wound Type:    Description of Altered Skin Integrity:    Ankle-Brachial Index:    Pulses:    Removal Indication and Assessment:    Wound Outcome:    (Retired) Wound Length (cm):    (Retired) Wound Width (cm):    (Retired) Depth (cm):    Wound Description (Comments):    Removal Indications:    Wound Image   09/01/23 0837   Description of Altered Skin Integrity Full thickness tissue loss. Subcutaneous fat may be visible but bone, tendon or muscle are not exposed 09/01/23 0837   Dressing Appearance Moist drainage 09/01/23 0837   Drainage Amount Moderate 09/01/23 0837   Drainage Characteristics/Odor Serosanguineous 09/01/23 0837   Appearance Red 09/01/23 0837   Red (%), Wound Tissue Color 100 % 09/01/23 0837   Periwound Area Intact 09/01/23 0837   Wound Edges Defined 09/01/23 0837   Wound Length  (cm) 1.2 cm 09/01/23 0837   Wound Width (cm) 3.8 cm 09/01/23 0837   Wound Depth (cm) 1.3 cm 09/01/23 0837   Wound Volume (cm^3) 5.928 cm^3 09/01/23 0837   Wound Surface Area (cm^2) 4.56 cm^2 09/01/23 0837   Tunneling (depth (cm)/location) @9:00 = 9cm 09/01/23 0837   Care Cleansed with:;Sterile normal saline 09/01/23 0837   Dressing Applied;Silver;Calcium alginate;Absorptive Pad 09/01/23 0837   Packing hydrofiber/alginate 09/01/23 0837   Packing Inserted  1 09/01/23 0837   Dressing Change Due 09/02/23 09/01/23 0837            Altered Skin Integrity 09/01/23 0814 midline Abdomen (Active)   09/01/23 0814   Altered Skin Integrity Present on Admission - Did Patient arrive to the hospital with altered skin?: yes   Side:    Orientation: midline   Location: Abdomen   Wound Number:    Is this injury device related?:    Primary Wound Type:    Description of Altered Skin Integrity:    Ankle-Brachial Index:    Pulses:    Removal Indication and Assessment:    Wound Outcome:    (Retired) Wound Length (cm):    (Retired) Wound Width (cm):    (Retired) Depth (cm):    Wound Description (Comments):    Removal Indications:    Wound Image   09/01/23 0837   Description of Altered Skin Integrity Full thickness tissue loss. Subcutaneous fat may be visible but bone, tendon or muscle are not exposed 09/01/23 0837   Drainage Amount Moderate 09/01/23 0837   Drainage Characteristics/Odor Serosanguineous 09/01/23 0837   Appearance Red 09/01/23 0837   Red (%), Wound Tissue Color 100 % 09/01/23 0837   Periwound Area Intact 09/01/23 0837   Wound Edges Defined 09/01/23 0837   Wound Length (cm) 0.7 cm 09/01/23 0837   Wound Width (cm) 1.5 cm 09/01/23 0837   Wound Depth (cm) 2 cm 09/01/23 0837   Wound Volume (cm^3) 2.1 cm^3 09/01/23 0837   Wound Surface Area (cm^2) 1.05 cm^2 09/01/23 0837   Tunneling (depth (cm)/location) @9:00 = 3.2cm 09/01/23 0837   Care Cleansed with:;Sterile normal saline 09/01/23 0837   Dressing Applied;Silver;Calcium  alginate;Absorptive Pad 09/01/23 0837   Packing hydrofiber/alginate 09/01/23 0837   Packing Inserted  1 09/01/23 0837   Dressing Change Due 09/02/23 09/01/23 0837            Altered Skin Integrity 09/01/23 0815 Umbilical area (Active)   09/01/23 0815   Altered Skin Integrity Present on Admission - Did Patient arrive to the hospital with altered skin?: yes   Side:    Orientation:    Location: Umbilical area   Wound Number:    Is this injury device related?:    Primary Wound Type:    Description of Altered Skin Integrity:    Ankle-Brachial Index:    Pulses:    Removal Indication and Assessment:    Wound Outcome:    (Retired) Wound Length (cm):    (Retired) Wound Width (cm):    (Retired) Depth (cm):    Wound Description (Comments):    Removal Indications:    Wound Image   09/01/23 0837   Description of Altered Skin Integrity Full thickness tissue loss. Subcutaneous fat may be visible but bone, tendon or muscle are not exposed 09/01/23 0837   Dressing Appearance Moist drainage 09/01/23 0837   Drainage Amount Moderate 09/01/23 0837   Drainage Characteristics/Odor Serosanguineous 09/01/23 0837   Appearance Red 09/01/23 0837   Red (%), Wound Tissue Color 100 % 09/01/23 0837   Periwound Area Intact 09/01/23 0837   Wound Edges Defined 09/01/23 0837   Wound Length (cm) 1.5 cm 09/01/23 0837   Wound Width (cm) 1.7 cm 09/01/23 0837   Wound Depth (cm) 0.8 cm 09/01/23 0837   Wound Volume (cm^3) 2.04 cm^3 09/01/23 0837   Wound Surface Area (cm^2) 2.55 cm^2 09/01/23 0837   Undermining (depth (cm)/location) 3.0cm, 12-12:00 09/01/23 0837   Care Cleansed with:;Sterile normal saline 09/01/23 0837   Dressing Applied;Silver;Calcium alginate;Absorptive Pad 09/01/23 0837   Packing packed with;hydrofiber/alginate 09/01/23 0837   Packing Inserted  1 09/01/23 0837   Dressing Change Due 09/02/23 09/01/23 0837         Assessment/Plan:         ICD-10-CM ICD-9-CM   1. Abdominal wound dehiscence, initial encounter  T81.30XA 998.30   2. S/P  abdominoplasty  Z98.890 V45.89           Tissue pathology and/or culture taken:  [] Yes [x] No   Sharp debridement performed:   [] Yes [x] No   Labs ordered this visit:   [] Yes [x] No   Imaging ordered this visit:   [] Yes [x] No           Orders Placed This Encounter   Procedures    Change dressing     Abdominal wounds:  Cleanse wound with:saline  Lidocaine:prn  Silver nitrate:prn  Periwound care:maintain dry  Primary dressing:pack with silver alginate strip  Secondary dressing:small ABD pad  Frequency:daily  Follow-up:1 week   Other: Follow up with plastic surgeon next week        Follow up in about 1 week (around 9/8/2023) for .            This includes face to face time and non-face to face time preparing to see the patient (eg, review of tests), obtaining and/or reviewing separately obtained history, documenting clinical information in the electronic or other health record, independently interpreting results and communicating results to the patient/family/caregiver, or care coordinator.

## 2023-09-18 ENCOUNTER — PATIENT MESSAGE (OUTPATIENT)
Dept: PRIMARY CARE CLINIC | Facility: CLINIC | Age: 58
End: 2023-09-18
Payer: OTHER GOVERNMENT

## 2023-10-11 ENCOUNTER — OFFICE VISIT (OUTPATIENT)
Dept: PRIMARY CARE CLINIC | Facility: CLINIC | Age: 58
End: 2023-10-11
Payer: OTHER GOVERNMENT

## 2023-10-11 VITALS
HEIGHT: 63 IN | TEMPERATURE: 98 F | OXYGEN SATURATION: 96 % | SYSTOLIC BLOOD PRESSURE: 120 MMHG | WEIGHT: 178.69 LBS | DIASTOLIC BLOOD PRESSURE: 70 MMHG | HEART RATE: 62 BPM | RESPIRATION RATE: 18 BRPM | BODY MASS INDEX: 31.66 KG/M2

## 2023-10-11 DIAGNOSIS — Z12.31 ENCOUNTER FOR SCREENING MAMMOGRAM FOR BREAST CANCER: ICD-10-CM

## 2023-10-11 DIAGNOSIS — E78.5 HYPERLIPIDEMIA, UNSPECIFIED HYPERLIPIDEMIA TYPE: ICD-10-CM

## 2023-10-11 DIAGNOSIS — E78.5 TYPE 2 DIABETES MELLITUS WITH HYPERLIPIDEMIA: Primary | ICD-10-CM

## 2023-10-11 DIAGNOSIS — E11.69 TYPE 2 DIABETES MELLITUS WITH HYPERLIPIDEMIA: Primary | ICD-10-CM

## 2023-10-11 PROCEDURE — 99214 PR OFFICE/OUTPT VISIT, EST, LEVL IV, 30-39 MIN: ICD-10-PCS | Mod: S$GLB,,, | Performed by: FAMILY MEDICINE

## 2023-10-11 PROCEDURE — 99999 PR PBB SHADOW E&M-EST. PATIENT-LVL IV: CPT | Mod: PBBFAC,,, | Performed by: FAMILY MEDICINE

## 2023-10-11 PROCEDURE — 99999 PR PBB SHADOW E&M-EST. PATIENT-LVL IV: ICD-10-PCS | Mod: PBBFAC,,, | Performed by: FAMILY MEDICINE

## 2023-10-11 PROCEDURE — 99214 OFFICE O/P EST MOD 30 MIN: CPT | Mod: S$GLB,,, | Performed by: FAMILY MEDICINE

## 2023-10-11 NOTE — PROGRESS NOTES
"Subjective:       Patient ID: Rosina Koenig is a 58 y.o. female.    Chief Complaint: Diabetes    Rosina Koenig is a 58 y.o. female seen today for a routine checkup. The patient has no specific complaints or concerns at this time.  No recent illness or injury.  Compliant with all prescribed medications without adverse side effects.     Diabetes  Pertinent negatives for hypoglycemia include no dizziness. Pertinent negatives for diabetes include no chest pain and no fatigue.     Review of Systems   Constitutional:  Negative for chills, fatigue and fever.   HENT:  Negative for congestion.    Eyes:  Negative for visual disturbance.   Respiratory:  Negative for cough and shortness of breath.    Cardiovascular:  Negative for chest pain.   Gastrointestinal:  Negative for abdominal pain, nausea and vomiting.   Genitourinary:  Negative for difficulty urinating.   Musculoskeletal:  Negative for arthralgias.   Skin:  Positive for wound. Negative for rash.   Neurological:  Negative for dizziness.   Hematological:  Does not bruise/bleed easily.   Psychiatric/Behavioral:  Negative for sleep disturbance.        Objective:      Vitals:    10/11/23 0738   BP: 120/70   BP Location: Right arm   Patient Position: Sitting   BP Method: Medium (Manual)   Pulse: 62   Resp: 18   Temp: 97.6 °F (36.4 °C)   TempSrc: Temporal   SpO2: 96%   Weight: 81.1 kg (178 lb 10.9 oz)   Height: 5' 3" (1.6 m)     BP Readings from Last 5 Encounters:   10/11/23 120/70   09/01/23 139/77   08/30/23 130/64   05/22/23 116/76   04/11/23 116/80     Wt Readings from Last 5 Encounters:   10/11/23 81.1 kg (178 lb 10.9 oz)   09/01/23 79.2 kg (174 lb 9.6 oz)   08/30/23 79.2 kg (174 lb 9.7 oz)   05/22/23 91.3 kg (201 lb 6.2 oz)   04/11/23 94.7 kg (208 lb 10.7 oz)     Physical Exam  Vitals and nursing note reviewed.   Constitutional:       General: She is not in acute distress.     Appearance: Normal appearance. She is well-developed.   HENT:      Head: " Normocephalic and atraumatic.   Cardiovascular:      Rate and Rhythm: Normal rate and regular rhythm.      Heart sounds: Normal heart sounds.   Pulmonary:      Effort: Pulmonary effort is normal.      Breath sounds: Normal breath sounds.   Musculoskeletal:      Right lower leg: No edema.      Left lower leg: No edema.   Feet:      Right foot:      Protective Sensation: 10 sites tested.  10 sites sensed.      Skin integrity: Skin integrity normal. No ulcer or skin breakdown.      Left foot:      Protective Sensation: 10 sites tested.  10 sites sensed.      Skin integrity: Skin integrity normal. No ulcer or skin breakdown.   Skin:     General: Skin is warm and dry.   Neurological:      Mental Status: She is alert and oriented to person, place, and time.   Psychiatric:         Mood and Affect: Mood normal.         Behavior: Behavior normal.         Lab Results   Component Value Date    WBC 8.37 05/22/2023    HGB 12.2 05/22/2023    HCT 40.7 05/22/2023     05/22/2023    CHOL 117 (L) 04/05/2023    TRIG 78 04/05/2023    HDL 47 04/05/2023    ALT 16 10/04/2023    AST 20 10/04/2023     10/04/2023    K 3.9 10/04/2023     (H) 10/04/2023    CREATININE 1.3 10/04/2023    BUN 21 (H) 10/04/2023    CO2 22 (L) 10/04/2023    TSH 1.75 02/06/2019    HGBA1C 6.2 (H) 10/04/2023      Assessment:       1. Type 2 diabetes mellitus with hyperlipidemia    2. Encounter for screening mammogram for breast cancer    3. Hyperlipidemia, unspecified hyperlipidemia type        Plan:       Type 2 diabetes mellitus with hyperlipidemia  -     Comprehensive Metabolic Panel; Future; Expected date: 04/11/2024  -     Hemoglobin A1C; Future; Expected date: 04/11/2024  -     Microalbumin/Creatinine Ratio, Urine; Future; Expected date: 04/11/2024  -     HM DIABETES FOOT EXAM  Well controlled  Encounter for screening mammogram for breast cancer  -     Mammo Digital Screening Bilat w/ Zev; Future; Expected date: 11/01/2023    Hyperlipidemia,  unspecified hyperlipidemia type  -     CBC Auto Differential; Future; Expected date: 04/11/2024  -     Comprehensive Metabolic Panel; Future; Expected date: 04/11/2024  -     Lipid Panel; Future; Expected date: 04/11/2024  Continue current meds    Medication List with Changes/Refills   Current Medications    ALBUTEROL (PROAIR HFA) 90 MCG/ACTUATION INHALER    Inhale 2 puffs into the lungs every 4 (four) hours as needed for Wheezing or Shortness of Breath. Rescue    ALBUTEROL (PROVENTIL) 2.5 MG /3 ML (0.083 %) NEBULIZER SOLUTION    Take 3 mLs (2.5 mg total) by nebulization every 4 (four) hours as needed for Wheezing or Shortness of Breath. Rescue    ATORVASTATIN (LIPITOR) 10 MG TABLET    Take 1 tablet (10 mg total) by mouth once daily.    CALCIUM-VITAMIN D 600 MG(1,500MG) -400 UNIT TAB    Take by mouth.    CETIRIZINE (ZYRTEC) 10 MG TABLET    Take 10 mg by mouth once daily.    METFORMIN (GLUCOPHAGE-XR) 750 MG ER 24HR TABLET    Take 2 tablets (1,500 mg total) by mouth daily with breakfast.   Discontinued Medications    ONDANSETRON (ZOFRAN) 4 MG TABLET    Take 1 tablet (4 mg total) by mouth 2 (two) times daily.    PANTOPRAZOLE (PROTONIX) 40 MG TABLET    Take 1 tablet (40 mg total) by mouth once daily.

## 2023-10-18 ENCOUNTER — PATIENT MESSAGE (OUTPATIENT)
Dept: CARDIOLOGY | Facility: CLINIC | Age: 58
End: 2023-10-18
Payer: OTHER GOVERNMENT

## 2023-11-06 ENCOUNTER — HOSPITAL ENCOUNTER (OUTPATIENT)
Dept: RADIOLOGY | Facility: HOSPITAL | Age: 58
Discharge: HOME | End: 2023-11-06
Attending: FAMILY MEDICINE
Payer: OTHER GOVERNMENT

## 2023-11-06 DIAGNOSIS — Z12.31 ENCOUNTER FOR SCREENING MAMMOGRAM FOR BREAST CANCER: ICD-10-CM

## 2023-11-06 PROCEDURE — 77067 SCR MAMMO BI INCL CAD: CPT | Mod: TC

## 2023-11-06 PROCEDURE — 77063 MAMMO DIGITAL SCREENING BILAT WITH TOMO: ICD-10-PCS | Mod: 26,,, | Performed by: RADIOLOGY

## 2023-11-06 PROCEDURE — 77067 MAMMO DIGITAL SCREENING BILAT WITH TOMO: ICD-10-PCS | Mod: 26,,, | Performed by: RADIOLOGY

## 2023-11-06 PROCEDURE — 77067 SCR MAMMO BI INCL CAD: CPT | Mod: 26,,, | Performed by: RADIOLOGY

## 2023-11-06 PROCEDURE — 77063 BREAST TOMOSYNTHESIS BI: CPT | Mod: 26,,, | Performed by: RADIOLOGY

## 2023-11-19 DIAGNOSIS — E11.69 TYPE 2 DIABETES MELLITUS WITH HYPERLIPIDEMIA: ICD-10-CM

## 2023-11-19 DIAGNOSIS — J45.20 MILD INTERMITTENT ASTHMA WITHOUT COMPLICATION: ICD-10-CM

## 2023-11-19 DIAGNOSIS — E78.5 TYPE 2 DIABETES MELLITUS WITH HYPERLIPIDEMIA: ICD-10-CM

## 2023-11-19 RX ORDER — METFORMIN HYDROCHLORIDE 750 MG/1
1500 TABLET, EXTENDED RELEASE ORAL
Qty: 180 TABLET | Refills: 3 | Status: SHIPPED | OUTPATIENT
Start: 2023-11-19

## 2023-11-19 RX ORDER — ALBUTEROL SULFATE 90 UG/1
AEROSOL, METERED RESPIRATORY (INHALATION)
Qty: 54 G | Refills: 3 | Status: SHIPPED | OUTPATIENT
Start: 2023-11-19 | End: 2023-11-19 | Stop reason: SDUPTHER

## 2023-11-19 RX ORDER — ALBUTEROL SULFATE 0.83 MG/ML
2.5 SOLUTION RESPIRATORY (INHALATION) EVERY 4 HOURS PRN
Qty: 150 ML | Refills: 3 | Status: SHIPPED | OUTPATIENT
Start: 2023-11-19

## 2023-11-19 RX ORDER — ALBUTEROL SULFATE 90 UG/1
2 AEROSOL, METERED RESPIRATORY (INHALATION) EVERY 4 HOURS PRN
Qty: 54 G | Refills: 3 | Status: SHIPPED | OUTPATIENT
Start: 2023-11-19

## 2023-11-19 NOTE — TELEPHONE ENCOUNTER
Refill Decision Note   Rosina Koenig  is requesting a refill authorization.  Brief Assessment and Rationale for Refill:  Approve     Medication Therapy Plan:       Medication Reconciliation Completed: No   Comments:     No Care Gaps recommended.     Note composed:1:30 PM 11/19/2023

## 2023-11-19 NOTE — TELEPHONE ENCOUNTER
No care due was identified.  [facility] Embedded Care Due Messages. Reference number: [id number].   11/19/2023 1:45:38 PM CST

## 2024-02-12 ENCOUNTER — PATIENT MESSAGE (OUTPATIENT)
Dept: PRIMARY CARE CLINIC | Facility: CLINIC | Age: 59
End: 2024-02-12
Payer: OTHER GOVERNMENT

## 2024-02-12 DIAGNOSIS — J20.9 ACUTE BRONCHITIS, UNSPECIFIED ORGANISM: Primary | ICD-10-CM

## 2024-02-22 NOTE — PROGRESS NOTES
Rosina Koenig is a 54 y.o. female here for a diabetic eye screening with non-dilated fundus photos.     Patient cooperative?: Yes  Small pupils?: No  Last eye exam: unknown    For exam results, see Encounter Report.     BPH (benign prostatic hyperplasia) Metabolic encephalopathy

## 2024-04-26 ENCOUNTER — OFFICE VISIT (OUTPATIENT)
Dept: PRIMARY CARE CLINIC | Facility: CLINIC | Age: 59
End: 2024-04-26
Payer: OTHER GOVERNMENT

## 2024-04-26 VITALS
WEIGHT: 197.31 LBS | DIASTOLIC BLOOD PRESSURE: 58 MMHG | OXYGEN SATURATION: 97 % | TEMPERATURE: 97 F | SYSTOLIC BLOOD PRESSURE: 122 MMHG | BODY MASS INDEX: 34.96 KG/M2 | HEIGHT: 63 IN | RESPIRATION RATE: 16 BRPM | HEART RATE: 60 BPM

## 2024-04-26 DIAGNOSIS — E11.69 TYPE 2 DIABETES MELLITUS WITH HYPERLIPIDEMIA: Primary | ICD-10-CM

## 2024-04-26 DIAGNOSIS — E78.5 HYPERLIPIDEMIA, UNSPECIFIED HYPERLIPIDEMIA TYPE: ICD-10-CM

## 2024-04-26 DIAGNOSIS — N18.31 CHRONIC KIDNEY DISEASE, STAGE 3A: ICD-10-CM

## 2024-04-26 DIAGNOSIS — J45.20 MILD INTERMITTENT ASTHMA WITHOUT COMPLICATION: ICD-10-CM

## 2024-04-26 DIAGNOSIS — E78.5 TYPE 2 DIABETES MELLITUS WITH HYPERLIPIDEMIA: Primary | ICD-10-CM

## 2024-04-26 PROBLEM — T81.321A ABDOMINAL WOUND DEHISCENCE: Status: RESOLVED | Noted: 2023-09-01 | Resolved: 2024-04-26

## 2024-04-26 PROBLEM — T81.30XA ABDOMINAL WOUND DEHISCENCE: Status: RESOLVED | Noted: 2023-09-01 | Resolved: 2024-04-26

## 2024-04-26 PROCEDURE — 99999 PR PBB SHADOW E&M-EST. PATIENT-LVL III: CPT | Mod: PBBFAC,,, | Performed by: FAMILY MEDICINE

## 2024-04-26 PROCEDURE — 99214 OFFICE O/P EST MOD 30 MIN: CPT | Mod: S$GLB,,, | Performed by: FAMILY MEDICINE

## 2024-04-26 RX ORDER — ALBUTEROL SULFATE 0.83 MG/ML
2.5 SOLUTION RESPIRATORY (INHALATION) EVERY 4 HOURS PRN
Qty: 150 ML | Refills: 3 | Status: SHIPPED | OUTPATIENT
Start: 2024-04-26

## 2024-04-26 RX ORDER — METFORMIN HYDROCHLORIDE 750 MG/1
1500 TABLET, EXTENDED RELEASE ORAL
Qty: 180 TABLET | Refills: 3 | Status: SHIPPED | OUTPATIENT
Start: 2024-04-26

## 2024-04-26 RX ORDER — ALBUTEROL SULFATE 90 UG/1
2 AEROSOL, METERED RESPIRATORY (INHALATION) EVERY 4 HOURS PRN
Qty: 54 G | Refills: 3 | Status: SHIPPED | OUTPATIENT
Start: 2024-04-26

## 2024-04-26 RX ORDER — ATORVASTATIN CALCIUM 10 MG/1
10 TABLET, FILM COATED ORAL DAILY
Qty: 90 TABLET | Refills: 3 | Status: SHIPPED | OUTPATIENT
Start: 2024-04-26

## 2024-04-26 NOTE — PROGRESS NOTES
Subjective:       Patient ID: Rosina Koenig is a 59 y.o. female.    Chief Complaint: Follow-up (6 months) and Hand Pain (Bilateral hand pain (pinky & thumb))    Rosina Koenig is a 59 y.o. female seen today for a routine checkup. The patient has no specific complaints or concerns at this time.  No recent illness or injury.  Compliant with all prescribed medications without adverse side effects.     Follow-up  Pertinent negatives include no chest pain, fatigue, headaches, visual change or weakness.   Hand Pain   Pertinent negatives include no chest pain.   Diabetes  She has type 2 diabetes mellitus. No MedicAlert identification noted. The initial diagnosis of diabetes was made 20 years ago. Pertinent negatives for hypoglycemia include no confusion, dizziness, headaches, hunger, mood changes, nervousness/anxiousness, pallor, seizures, sleepiness, speech difficulty, sweats or tremors. Pertinent negatives for diabetes include no blurred vision, no chest pain, no fatigue, no foot paresthesias, no foot ulcerations, no polydipsia, no polyphagia, no polyuria, no visual change, no weakness and no weight loss. Pertinent negatives for hypoglycemia complications include no blackouts, no hospitalization, no nocturnal hypoglycemia, no required assistance and no required glucagon injection. Symptoms are stable. Pertinent negatives for diabetic complications include no autonomic neuropathy, CVA, heart disease, nephropathy, peripheral neuropathy, PVD or retinopathy. Risk factors for coronary artery disease include obesity and diabetes mellitus. Current diabetic treatment includes oral agent (monotherapy). She is compliant with treatment all of the time. Her weight is stable. When asked about meal planning, she reported none. She has not had a previous visit with a dietitian. She never participates in exercise. There is no compliance with monitoring of blood glucose. There is no change in her home blood glucose trend.  "She does not see a podiatrist.Eye exam is not current.     Review of Systems   Constitutional:  Negative for fatigue and weight loss.   Eyes:  Negative for blurred vision.   Cardiovascular:  Negative for chest pain.   Endocrine: Negative for polydipsia, polyphagia and polyuria.   Skin:  Negative for pallor.   Neurological:  Negative for dizziness, tremors, seizures, speech difficulty, weakness and headaches.   Psychiatric/Behavioral:  Negative for confusion. The patient is not nervous/anxious.        Objective:      Vitals:    04/26/24 1001   BP: (!) 122/58   BP Location: Right arm   Patient Position: Sitting   BP Method: Medium (Manual)   Pulse: 60   Resp: 16   Temp: 97.3 °F (36.3 °C)   TempSrc: Temporal   SpO2: 97%   Weight: 89.5 kg (197 lb 5 oz)   Height: 5' 3" (1.6 m)     BP Readings from Last 5 Encounters:   04/26/24 (!) 122/58   10/11/23 120/70   09/01/23 139/77   08/30/23 130/64   05/22/23 116/76     Wt Readings from Last 5 Encounters:   04/26/24 89.5 kg (197 lb 5 oz)   10/11/23 81.1 kg (178 lb 10.9 oz)   09/01/23 79.2 kg (174 lb 9.6 oz)   08/30/23 79.2 kg (174 lb 9.7 oz)   05/22/23 91.3 kg (201 lb 6.2 oz)     Physical Exam  Vitals and nursing note reviewed.   Constitutional:       General: She is not in acute distress.     Appearance: Normal appearance. She is well-developed.   HENT:      Head: Normocephalic and atraumatic.   Cardiovascular:      Rate and Rhythm: Normal rate and regular rhythm.      Heart sounds: Normal heart sounds.   Pulmonary:      Effort: Pulmonary effort is normal.      Breath sounds: Normal breath sounds.   Musculoskeletal:      Right lower leg: No edema.      Left lower leg: No edema.   Skin:     General: Skin is warm and dry.   Neurological:      Mental Status: She is alert and oriented to person, place, and time.   Psychiatric:         Mood and Affect: Mood normal.         Behavior: Behavior normal.         Lab Results   Component Value Date    WBC 6.64 04/20/2024    HGB 11.9 (L) " 04/20/2024    HCT 39.7 04/20/2024     04/20/2024    CHOL 133 04/20/2024    TRIG 68 04/20/2024    HDL 61 04/20/2024    ALT 13 04/20/2024    AST 16 04/20/2024     04/20/2024    K 4.4 04/20/2024     04/20/2024    CREATININE 1.1 04/20/2024    BUN 21 (H) 04/20/2024    CO2 24 04/20/2024    TSH 1.75 02/06/2019    HGBA1C 6.4 (H) 04/20/2024      Assessment:       1. Type 2 diabetes mellitus with hyperlipidemia    2. Chronic kidney disease, stage 3a    3. Hyperlipidemia, unspecified hyperlipidemia type    4. Mild intermittent asthma without complication        Plan:       Type 2 diabetes mellitus with hyperlipidemia  -     Basic Metabolic Panel; Future; Expected date: 10/26/2024  -     Hemoglobin A1C; Future; Expected date: 10/26/2024  -     metFORMIN (GLUCOPHAGE-XR) 750 MG ER 24hr tablet; Take 2 tablets (1,500 mg total) by mouth daily with breakfast.  Dispense: 180 tablet; Refill: 3    Chronic kidney disease, stage 3a  -     Basic Metabolic Panel; Future; Expected date: 10/26/2024    Hyperlipidemia, unspecified hyperlipidemia type  -     atorvastatin (LIPITOR) 10 MG tablet; Take 1 tablet (10 mg total) by mouth once daily.  Dispense: 90 tablet; Refill: 3    Mild intermittent asthma without complication  -     albuterol (PROVENTIL/VENTOLIN HFA) 90 mcg/actuation inhaler; Inhale 2 puffs into the lungs every 4 (four) hours as needed for Wheezing or Shortness of Breath. Rescue  Dispense: 54 g; Refill: 3  -     albuterol (PROVENTIL) 2.5 mg /3 mL (0.083 %) nebulizer solution; Take 3 mLs (2.5 mg total) by nebulization every 4 (four) hours as needed for Wheezing or Shortness of Breath. Rescue  Dispense: 150 mL; Refill: 3      Medication List with Changes/Refills   Current Medications    CALCIUM-VITAMIN D 600 MG(1,500MG) -400 UNIT TAB    Take by mouth.    CETIRIZINE (ZYRTEC) 10 MG TABLET    Take 10 mg by mouth once daily.   Changed and/or Refilled Medications    Modified Medication Previous Medication    ALBUTEROL  (PROVENTIL) 2.5 MG /3 ML (0.083 %) NEBULIZER SOLUTION albuterol (PROVENTIL) 2.5 mg /3 mL (0.083 %) nebulizer solution       Take 3 mLs (2.5 mg total) by nebulization every 4 (four) hours as needed for Wheezing or Shortness of Breath. Rescue    Take 3 mLs (2.5 mg total) by nebulization every 4 (four) hours as needed for Wheezing or Shortness of Breath. Rescue    ALBUTEROL (PROVENTIL/VENTOLIN HFA) 90 MCG/ACTUATION INHALER albuterol (PROVENTIL/VENTOLIN HFA) 90 mcg/actuation inhaler       Inhale 2 puffs into the lungs every 4 (four) hours as needed for Wheezing or Shortness of Breath. Rescue    Inhale 2 puffs into the lungs every 4 (four) hours as needed for Wheezing or Shortness of Breath. Rescue    ATORVASTATIN (LIPITOR) 10 MG TABLET atorvastatin (LIPITOR) 10 MG tablet       Take 1 tablet (10 mg total) by mouth once daily.    Take 1 tablet (10 mg total) by mouth once daily.    METFORMIN (GLUCOPHAGE-XR) 750 MG ER 24HR TABLET metFORMIN (GLUCOPHAGE-XR) 750 MG ER 24hr tablet       Take 2 tablets (1,500 mg total) by mouth daily with breakfast.    Take 2 tablets (1,500 mg total) by mouth daily with breakfast.

## 2024-05-06 ENCOUNTER — TELEPHONE (OUTPATIENT)
Dept: ORTHOPEDICS | Facility: CLINIC | Age: 59
End: 2024-05-06
Payer: OTHER GOVERNMENT

## 2024-05-06 DIAGNOSIS — M79.642 PAIN IN BOTH HANDS: Primary | ICD-10-CM

## 2024-05-06 DIAGNOSIS — M79.641 PAIN IN BOTH HANDS: Primary | ICD-10-CM

## 2024-05-06 NOTE — TELEPHONE ENCOUNTER
----- Message from Patricia Wright LPN sent at 5/6/2024 11:25 AM CDT -----  Regarding: FW: Appt  Contact: pt [phone]    ----- Message -----  From: Chio Escalante  Sent: 5/6/2024  11:22 AM CDT  To: [facility] Staff  Subject: Appt                                             Pt is scheduled to see provider 5/17, would like to know if imaging is needed, please call pt @[phone]

## 2024-05-17 ENCOUNTER — OFFICE VISIT (OUTPATIENT)
Dept: ORTHOPEDICS | Facility: CLINIC | Age: 59
End: 2024-05-17
Payer: OTHER GOVERNMENT

## 2024-05-17 ENCOUNTER — PATIENT MESSAGE (OUTPATIENT)
Dept: ORTHOPEDICS | Facility: CLINIC | Age: 59
End: 2024-05-17

## 2024-05-17 VITALS
SYSTOLIC BLOOD PRESSURE: 117 MMHG | WEIGHT: 198.31 LBS | HEART RATE: 57 BPM | DIASTOLIC BLOOD PRESSURE: 69 MMHG | BODY MASS INDEX: 35.13 KG/M2

## 2024-05-17 DIAGNOSIS — M79.642 BILATERAL HAND PAIN: ICD-10-CM

## 2024-05-17 DIAGNOSIS — M19.041 PRIMARY OSTEOARTHRITIS OF BOTH HANDS: ICD-10-CM

## 2024-05-17 DIAGNOSIS — M10.00 ACUTE IDIOPATHIC GOUT, UNSPECIFIED SITE: Primary | ICD-10-CM

## 2024-05-17 DIAGNOSIS — M65.312 TRIGGER FINGER OF LEFT THUMB: Primary | ICD-10-CM

## 2024-05-17 DIAGNOSIS — M79.641 BILATERAL HAND PAIN: ICD-10-CM

## 2024-05-17 DIAGNOSIS — M19.042 PRIMARY OSTEOARTHRITIS OF BOTH HANDS: ICD-10-CM

## 2024-05-17 PROCEDURE — 99999 PR PBB SHADOW E&M-EST. PATIENT-LVL III: CPT | Mod: PBBFAC,,,

## 2024-05-17 PROCEDURE — 99203 OFFICE O/P NEW LOW 30 MIN: CPT | Mod: S$GLB,,,

## 2024-05-17 RX ORDER — DICLOFENAC SODIUM 10 MG/G
4 GEL TOPICAL 3 TIMES DAILY
Qty: 1 EACH | Refills: 3 | Status: SHIPPED | OUTPATIENT
Start: 2024-05-17

## 2024-05-17 RX ORDER — METHYLPREDNISOLONE 4 MG/1
TABLET ORAL
Qty: 21 EACH | Refills: 0 | Status: SHIPPED | OUTPATIENT
Start: 2024-05-17

## 2024-05-17 NOTE — PROGRESS NOTES
SUBJECTIVE:      Chief Complaint: bilateral 5th digit pain and left thumb pain    History of Present Illness:  Patient is a 59 y.o. right hand dominant female who presents today with complaints of bilateral 5th digit pain and left thumb pain. Patient denies known NIMCO. She has noticed symptoms to her right 5th digit for about 2 months. She noticed left thumb locking up on her about 2 months ago as well, denies hx of trigger finger. Denies numbness or tingling. She has tried advil with no improvement. Denies previous surgeries or injections. 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  She is concerned about gout, does occasionally eat seafood.    The patient is a/an /retired.    Onset of symptoms/DOI was 2 months.    Symptoms are aggravated by morning for left thumb, but as the day progresses her fifth digits hurt more.    Symptoms are alleviated by none.    Symptoms consist of pain.    The patient rates their pain as a 5/10.    Attempted treatment(s) and/or interventions include activity modifications, rest, anti-inflammatory medications.     The patient denies any fevers, chills, N/V, D/C and presents for evaluation.       Past Medical History:   Diagnosis Date    Asthma     Diabetes     Hyperlipidemia     Neuropathy in diabetes     Obesity     ZAYNAB (obstructive sleep apnea)     Pedal edema      Past Surgical History:   Procedure Laterality Date    ABDOMINOPLASTY  2023     SECTION      X 1    GASTRIC RESTRICTION SURGERY      LOST 125#    HYSTEROSCOPY WITH DILATION AND CURETTAGE OF UTERUS N/A 2019    Procedure: HYSTEROSCOPY, WITH DILATION AND CURETTAGE OF UTERUS;  Surgeon: Aashish Ojeda MD;  Location: [facility];  Service: OB/GYN;  Laterality: N/A;  [facility] CONFIRMED /  [facility] CONFIRMED 5/10/    SPINE SURGERY      TONSILLECTOMY, ADENOIDECTOMY      UMBILICAL HERNIA REPAIR       Review of patient's allergies indicates:   Allergen Reactions    Lisinopril Other  (See Comments)     cough     Social History     Social History Narrative    Not on file     Family History   Problem Relation Name Age of Onset    Arthritis Mother      Asthma Mother      Diabetes Mother      Stroke Mother      Vision loss Mother      Arthritis Father      Diabetes Father      Hypertension Father      Arthritis Maternal Uncle      Diabetes Maternal Uncle      Hearing loss Maternal Uncle      Heart disease Maternal Uncle      Hypertension Maternal Uncle      Arthritis Paternal Aunt      Diabetes Paternal Aunt      Hypertension Paternal Aunt      Colon cancer Maternal Grandfather      Breast cancer Neg Hx      Ovarian cancer Neg Hx           Current Outpatient Medications:     albuterol (PROVENTIL) 2.5 mg /3 mL (0.083 %) nebulizer solution, Take 3 mLs (2.5 mg total) by nebulization every 4 (four) hours as needed for Wheezing or Shortness of Breath. Rescue, Disp: 150 mL, Rfl: 3    albuterol (PROVENTIL/VENTOLIN HFA) 90 mcg/actuation inhaler, Inhale 2 puffs into the lungs every 4 (four) hours as needed for Wheezing or Shortness of Breath. Rescue, Disp: 54 g, Rfl: 3    atorvastatin (LIPITOR) 10 MG tablet, Take 1 tablet (10 mg total) by mouth once daily., Disp: 90 tablet, Rfl: 3    calcium-vitamin D 600 mg(1,500mg) -400 unit Tab, Take by mouth., Disp: , Rfl:     cetirizine (ZYRTEC) 10 MG tablet, Take 10 mg by mouth once daily., Disp: , Rfl:     metFORMIN (GLUCOPHAGE-XR) 750 MG ER 24hr tablet, Take 2 tablets (1,500 mg total) by mouth daily with breakfast., Disp: 180 tablet, Rfl: 3    diclofenac sodium (VOLTAREN) 1 % Gel, Apply 4 g topically 3 (three) times daily., Disp: 1 each, Rfl: 3      Review of Systems:  Constitutional: no fever or chills  Eyes: no visual changes  ENT: no nasal congestion or sore throat  Respiratory: no cough or shortness of breath  Cardiovascular: no chest pain  Gastrointestinal: no nausea or vomiting, tolerating diet  Musculoskeletal: pain and soreness    OBJECTIVE:      Vital Signs  (Most Recent):  Vitals:    05/17/24 0821   BP: 117/69   Pulse: (!) 57   Weight: 89.9 kg (198 lb 4.9 oz)     Body mass index is 35.13 kg/m².      Physical Exam:  Constitutional: The patient appears well-developed and well-nourished. No distress.   Skin: No lesions appreciated  Head: Normocephalic and atraumatic.   Nose: Nose normal.   Ears: No deformities seen  Eyes: Conjunctivae and EOM are normal.   Neck: No tracheal deviation present.   Cardiovascular: Normal rate and intact distal pulses.    Pulmonary/Chest: Effort normal. No respiratory distress.   Abdominal: There is no guarding.   Neurological: The patient is alert.   Psychiatric: The patient has a normal mood and affect.     Bilateral Hand/Wrist Examination:    Observation/Inspection:  Swelling  none    Deformity  Mild varus deformity at bilateral 5th digits PIP joints  Discoloration  none     Scars   none    Atrophy  none    HAND/WRIST EXAMINATION:  Finkelstein's Test   Neg  WHAT Test    Neg  Snuff box tenderness   Neg  Tierney's Test    Neg  Hook of Hamate Tenderness  Neg  CMC grind    Neg  Circumduction test   Neg  TTP     B/l 5th digit PIP joints, stable to valgus and varus stress test, TTP at left thumb A1 pulley    Neurovascular Exam:  Digits WWP, brisk CR < 3s throughout  NVI motor/LTS to M/R/U nerves, radial pulse 2+  Tinel's Test - Carpal Tunnel  Neg  Tinel's Test - Cubital Tunnel  Neg  Phalen's Test    Neg  Median Nerve Compression Test Neg  AIN Intact (O-Doris), PIN Intact (Thumbs Up), Ulnar Intact (scissors)    ROM hand full, painless    ROM wrist full, painless    ROM elbow full, painless    Abdomen not guarded  Respirations nonlabored  Perfusion intact    Diagnostic Results:     Imaging - I independently viewed the patient's imaging as well as the radiology report.  Xrays of the patient's bilateral hands demonstrate no evidence of acute fracture or dislocation.  No significant degenerative changes.      ASSESSMENT/PLAN:      1. Trigger finger of  left thumb        2. Bilateral hand pain  URIC ACID    diclofenac sodium (VOLTAREN) 1 % Gel    Ambulatory referral/consult to Physical/Occupational Therapy      3. Primary osteoarthritis of both hands  URIC ACID    diclofenac sodium (VOLTAREN) 1 % Gel    Ambulatory referral/consult to Physical/Occupational Therapy          I made the decision to obtain old records of the patient including previous notes and imaging. If new imaging was ordered today of the extremity or extremities evaluated. I independently reviewed and interpreted the x-rays as well as prior imaging. Reviewed imaging in detail with patient.     We discussed at length different treatment options including conservative vs surgical management. These include anti-inflammatories, acetaminophen, rest, ice, heat, formal physical therapy including strengthening and stretching exercises, home exercise programs, injections, and finally surgical intervention.      Patient here today for acute on chronic bilateral 5th digit pain and locking of left thumb.  Regarding left thumb, we discussed this is likely trigger finger.  We discussed the diagnosis in detail including injections and therapy.  We will start with oval 8 splint as this is fairly intermittent.  Oval 8 splint given, may wear at night as needed.  Regarding her bilateral 5th digit pain, there is a mild deformity.  We discussed being conscious of how she holds objects and likely early signs of arthritis.  We will trial a round of formal occupational therapy and Voltaren gel.  Patient responsible to establish occupational therapy.  Voltaren gel, pea-sized amount to affected area 2-3 times daily  She was also given a handout arthritis    Follow up:  If symptoms worsen or fail to improve  Xrays needed:  None     All of the patient's questions were answered and the patient will contact us if they have any questions or concerns in the interim.

## 2024-09-19 ENCOUNTER — PATIENT MESSAGE (OUTPATIENT)
Dept: PRIMARY CARE CLINIC | Facility: CLINIC | Age: 59
End: 2024-09-19
Payer: OTHER GOVERNMENT

## 2024-10-22 ENCOUNTER — OFFICE VISIT (OUTPATIENT)
Dept: OPTOMETRY | Facility: CLINIC | Age: 59
End: 2024-10-22
Payer: OTHER GOVERNMENT

## 2024-10-22 DIAGNOSIS — E11.9 DIABETES MELLITUS WITHOUT COMPLICATION: Primary | ICD-10-CM

## 2024-10-22 DIAGNOSIS — H25.13 NUCLEAR SCLEROSIS OF BOTH EYES: ICD-10-CM

## 2024-10-22 PROCEDURE — 92015 DETERMINE REFRACTIVE STATE: CPT | Mod: S$GLB,,, | Performed by: OPTOMETRIST

## 2024-10-22 PROCEDURE — 92014 COMPRE OPH EXAM EST PT 1/>: CPT | Mod: S$GLB,,, | Performed by: OPTOMETRIST

## 2024-10-22 PROCEDURE — 99999 PR PBB SHADOW E&M-EST. PATIENT-LVL II: CPT | Mod: PBBFAC,,, | Performed by: OPTOMETRIST

## 2024-10-22 NOTE — PROGRESS NOTES
HPI    Pt is here today for routine eye exam. Denies pain/discomfort.  DLS: 8/21/2023 Dr. Meza  (-)Flashes   (+)Floaters   (-)Diplopia   (-)Headaches   (+)Itching: Allergy  (+)Tearing: Allergy  (-)Burning  (+)Dryness   (-)Photophobia  (+)Glare   (-)Blurred [facility]  Past Eye Sx: Lasik   Eye Meds: (-)   Hemoglobin A1C       Date                     Value               Ref Range             Status                10/21/2024               6.3 (H)             4.0 - 5.6 %           Final            Last edited by Deanna Meza, OD on 10/22/2024 10:52 AM.            Assessment /Plan     For exam results, see Encounter Report.    Diabetes mellitus without complication    Nuclear sclerosis of both eyes      1. No retinopathy noted today.  Continued control with primary care physician and annual comprehensive eye exam.     2. Educated pt on findings. Not visually significant. No need for removal at this time. Monitor yearly.      Eyeglass Final Rx       Eyeglass Final Rx         Sphere Cylinder Axis Add    Right [place] +0.75 180 +2.25    Left +1.00 Sphere  +2.25      Type: Bifocal    Expiration Date: 10/22/2025                   RTC in 1 year for annual eye exam unless changes noted sooner.

## 2024-10-28 ENCOUNTER — OFFICE VISIT (OUTPATIENT)
Dept: PRIMARY CARE CLINIC | Facility: CLINIC | Age: 59
End: 2024-10-28
Payer: OTHER GOVERNMENT

## 2024-10-28 VITALS
WEIGHT: 208.56 LBS | OXYGEN SATURATION: 96 % | HEART RATE: 56 BPM | TEMPERATURE: 97 F | DIASTOLIC BLOOD PRESSURE: 74 MMHG | BODY MASS INDEX: 36.95 KG/M2 | HEIGHT: 63 IN | SYSTOLIC BLOOD PRESSURE: 116 MMHG | RESPIRATION RATE: 18 BRPM

## 2024-10-28 DIAGNOSIS — E11.69 TYPE 2 DIABETES MELLITUS WITH HYPERLIPIDEMIA: Primary | ICD-10-CM

## 2024-10-28 DIAGNOSIS — E78.5 HYPERLIPIDEMIA, UNSPECIFIED HYPERLIPIDEMIA TYPE: ICD-10-CM

## 2024-10-28 DIAGNOSIS — E66.01 SEVERE OBESITY (BMI 35.0-39.9) WITH COMORBIDITY: ICD-10-CM

## 2024-10-28 DIAGNOSIS — Z23 NEED FOR VACCINATION: ICD-10-CM

## 2024-10-28 DIAGNOSIS — E78.5 TYPE 2 DIABETES MELLITUS WITH HYPERLIPIDEMIA: Primary | ICD-10-CM

## 2024-10-28 DIAGNOSIS — N18.31 CHRONIC KIDNEY DISEASE, STAGE 3A: ICD-10-CM

## 2024-10-28 PROCEDURE — 99214 OFFICE O/P EST MOD 30 MIN: CPT | Mod: 25,S$GLB,, | Performed by: FAMILY MEDICINE

## 2024-10-28 PROCEDURE — 90471 IMMUNIZATION ADMIN: CPT | Mod: S$GLB,,, | Performed by: FAMILY MEDICINE

## 2024-10-28 PROCEDURE — 90656 IIV3 VACC NO PRSV 0.5 ML IM: CPT | Mod: S$GLB,,, | Performed by: FAMILY MEDICINE

## 2024-10-28 PROCEDURE — 99999 PR PBB SHADOW E&M-EST. PATIENT-LVL IV: CPT | Mod: PBBFAC,,, | Performed by: FAMILY MEDICINE

## 2024-12-02 ENCOUNTER — HOSPITAL ENCOUNTER (OUTPATIENT)
Dept: RADIOLOGY | Facility: HOSPITAL | Age: 59
Discharge: HOME | End: 2024-12-02
Attending: FAMILY MEDICINE
Payer: OTHER GOVERNMENT

## 2024-12-02 DIAGNOSIS — Z12.31 ENCOUNTER FOR SCREENING MAMMOGRAM FOR BREAST CANCER: ICD-10-CM

## 2024-12-02 PROCEDURE — 77063 BREAST TOMOSYNTHESIS BI: CPT | Mod: 26,,, | Performed by: RADIOLOGY

## 2024-12-02 PROCEDURE — 77063 BREAST TOMOSYNTHESIS BI: CPT | Mod: TC

## 2024-12-02 PROCEDURE — 77067 SCR MAMMO BI INCL CAD: CPT | Mod: 26,,, | Performed by: RADIOLOGY

## 2025-04-28 ENCOUNTER — OFFICE VISIT (OUTPATIENT)
Dept: PRIMARY CARE CLINIC | Facility: CLINIC | Age: 60
End: 2025-04-28
Payer: OTHER GOVERNMENT

## 2025-04-28 VITALS
WEIGHT: 207.88 LBS | RESPIRATION RATE: 18 BRPM | OXYGEN SATURATION: 98 % | TEMPERATURE: 97 F | BODY MASS INDEX: 36.83 KG/M2 | SYSTOLIC BLOOD PRESSURE: 102 MMHG | HEIGHT: 63 IN | HEART RATE: 60 BPM | DIASTOLIC BLOOD PRESSURE: 72 MMHG

## 2025-04-28 DIAGNOSIS — R32 URINARY INCONTINENCE, UNSPECIFIED TYPE: Primary | ICD-10-CM

## 2025-04-28 DIAGNOSIS — E78.5 TYPE 2 DIABETES MELLITUS WITH HYPERLIPIDEMIA: ICD-10-CM

## 2025-04-28 DIAGNOSIS — J45.20 MILD INTERMITTENT ASTHMA WITHOUT COMPLICATION: ICD-10-CM

## 2025-04-28 DIAGNOSIS — E78.5 HYPERLIPIDEMIA, UNSPECIFIED HYPERLIPIDEMIA TYPE: ICD-10-CM

## 2025-04-28 DIAGNOSIS — N18.31 CHRONIC KIDNEY DISEASE, STAGE 3A: ICD-10-CM

## 2025-04-28 DIAGNOSIS — E66.01 SEVERE OBESITY (BMI 35.0-39.9) WITH COMORBIDITY: ICD-10-CM

## 2025-04-28 DIAGNOSIS — E11.69 TYPE 2 DIABETES MELLITUS WITH HYPERLIPIDEMIA: ICD-10-CM

## 2025-04-28 PROCEDURE — 99214 OFFICE O/P EST MOD 30 MIN: CPT | Mod: PBBFAC,PN | Performed by: FAMILY MEDICINE

## 2025-04-28 PROCEDURE — 99214 OFFICE O/P EST MOD 30 MIN: CPT | Mod: S$PBB,,, | Performed by: FAMILY MEDICINE

## 2025-04-28 PROCEDURE — 99999 PR PBB SHADOW E&M-EST. PATIENT-LVL IV: CPT | Mod: PBBFAC,,, | Performed by: FAMILY MEDICINE

## 2025-04-28 RX ORDER — ATORVASTATIN CALCIUM 10 MG/1
10 TABLET, FILM COATED ORAL DAILY
Qty: 90 TABLET | Refills: 3 | Status: SHIPPED | OUTPATIENT
Start: 2025-04-28

## 2025-04-28 RX ORDER — ALBUTEROL SULFATE 90 UG/1
2 INHALANT RESPIRATORY (INHALATION) EVERY 4 HOURS PRN
Qty: 54 G | Refills: 3 | Status: SHIPPED | OUTPATIENT
Start: 2025-04-28

## 2025-04-28 RX ORDER — OXYBUTYNIN CHLORIDE 5 MG/1
5 TABLET, EXTENDED RELEASE ORAL DAILY
Qty: 90 TABLET | Refills: 3 | Status: SHIPPED | OUTPATIENT
Start: 2025-04-28

## 2025-04-28 RX ORDER — METFORMIN HYDROCHLORIDE 750 MG/1
1500 TABLET, EXTENDED RELEASE ORAL
Qty: 180 TABLET | Refills: 3 | Status: SHIPPED | OUTPATIENT
Start: 2025-04-28

## 2025-04-28 NOTE — PROGRESS NOTES
Assessment:       1. Urinary incontinence, unspecified type    2. Type 2 diabetes mellitus with hyperlipidemia    3. Severe obesity (BMI 35.0-39.9) with comorbidity    4. Chronic kidney disease, stage 3a    5. Mild intermittent asthma without complication    6. Hyperlipidemia, unspecified hyperlipidemia type         Plan:       Urinary incontinence, unspecified type  -     oxybutynin (DITROPAN-XL) 5 MG TR24; Take 1 tablet (5 mg total) by mouth once daily.  Dispense: 90 tablet; Refill: 3    Type 2 diabetes mellitus with hyperlipidemia  -     Basic Metabolic Panel; Future; Expected date: 10/28/2025  -     Hemoglobin A1C; Future; Expected date: 10/28/2025  -     metFORMIN (GLUCOPHAGE-XR) 750 MG ER 24hr tablet; Take 2 tablets (1,500 mg total) by mouth daily with breakfast.  Dispense: 180 tablet; Refill: 3    Severe obesity (BMI 35.0-39.9) with comorbidity    Chronic kidney disease, stage 3a  -     Basic Metabolic Panel; Future; Expected date: 10/28/2025    Mild intermittent asthma without complication  -     albuterol (PROVENTIL/VENTOLIN HFA) 90 mcg/actuation inhaler; Inhale 2 puffs into the lungs every 4 (four) hours as needed for Wheezing or Shortness of Breath. Rescue  Dispense: 54 g; Refill: 3    Hyperlipidemia, unspecified hyperlipidemia type  -     atorvastatin (LIPITOR) 10 MG tablet; Take 1 tablet (10 mg total) by mouth once daily.  Dispense: 90 tablet; Refill: 3      Assessment & Plan    - Stage 3A CKD appears stable and is not likely the cause of urinary symptoms.  - Considered overactive bladder as potential cause of urinary urgency and leakage.    TYPE 2 DIABETES MELLITUS:   Monitored A1C level, showing a slight decrease from 6.3 to 6.2.   Evaluated A1C level at 6.2, which is stable compared to previous reading of 6.3.   Continued metformin for diabetes management.   Refilled prescription for metformin.   Continued long-term metformin therapy for diabetes management.    URGE INCONTINENCE (OVERACTIVE  BLADDER):   Evaluated the patient's symptoms of sudden urges to urinate, inability to control urination, and leakage when hearing or touching water.   Assessed overactive bladder as a possible cause.   Prescribed oxybutynin 5 mg extended-release daily for overactive bladder symptoms.   Instructed the patient to contact the office if oxybutynin is not effective after a few weeks of use.   Will consider referral to urogynecologist for further evaluation if symptoms do not improve with medication.    STRESS INCONTINENCE:   Noted patient's history of stress incontinence during coughing or sneezing.   Evaluated current symptoms, which differ from previous stress incontinence.   Confirmed no current leakage with coughing or sneezing.    HYPERLIPIDEMIA:   Continued atorvastatin for hyperlipidemia management.    CHRONIC KIDNEY DISEASE:   Assessed kidney function as stable, with stage 3A chronic kidney disease noted.    HISTORY OF URINARY TRACT INFECTION:   Noted patient's history of urinary tract infection.    CAREGIVER STATUS:   Noted that the patient is caring for her father who is in a nursing home and requires assistance.    FOLLOW-UP:   Follow up in 6 months.       Medication List with Changes/Refills   New Medications    OXYBUTYNIN (DITROPAN-XL) 5 MG TR24    Take 1 tablet (5 mg total) by mouth once daily.   Current Medications    ALBUTEROL (PROVENTIL) 2.5 MG /3 ML (0.083 %) NEBULIZER SOLUTION    Take 3 mLs (2.5 mg total) by nebulization every 4 (four) hours as needed for Wheezing or Shortness of Breath. Rescue    CETIRIZINE (ZYRTEC) 10 MG TABLET    Take 10 mg by mouth once daily.    DICLOFENAC SODIUM (VOLTAREN) 1 % GEL    Apply 4 g topically 3 (three) times daily.   Changed and/or Refilled Medications    Modified Medication Previous Medication    ALBUTEROL (PROVENTIL/VENTOLIN HFA) 90 MCG/ACTUATION INHALER albuterol (PROVENTIL/VENTOLIN HFA) 90 mcg/actuation inhaler       Inhale 2 puffs into the lungs every 4 (four)  hours as needed for Wheezing or Shortness of Breath. Rescue    Inhale 2 puffs into the lungs every 4 (four) hours as needed for Wheezing or Shortness of Breath. Rescue    ATORVASTATIN (LIPITOR) 10 MG TABLET atorvastatin (LIPITOR) 10 MG tablet       Take 1 tablet (10 mg total) by mouth once daily.    Take 1 tablet (10 mg total) by mouth once daily.    METFORMIN (GLUCOPHAGE-XR) 750 MG ER 24HR TABLET metFORMIN (GLUCOPHAGE-XR) 750 MG ER 24hr tablet       Take 2 tablets (1,500 mg total) by mouth daily with breakfast.    Take 2 tablets (1,500 mg total) by mouth daily with breakfast.         Subjective:    Patient ID: Rosina Koenig is a 60 y.o. female.  Chief Complaint: Follow-up (6 month) and Urinary Frequency (Started 3-4 months ago)    HPI  History of Present Illness    CHIEF COMPLAINT:  Patient presents today for follow-up of diabetes and other health concerns.    DIABETES:  Her A1C has slightly improved from 6.3% to 6.2%. She continues metformin daily for management.    URINARY SYMPTOMS:  She reports sudden urge to urinate with inability to control urination when hearing or touching water, particularly when using the sink. She experiences continued leakage even after emptying bladder and straining to void completely. She has been wearing pads continuously for the past 3-4 months. She has a history of stress incontinence with urinary leakage during coughing episodes. She denies nocturnal urinary leakage. She reports a urinary tract infection approximately 2-3 weeks ago but denies current UTI symptoms.    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 [place], spending 2-3 weeks caring for her father followed by approximately one week at home. She visits him daily from 8:00 AM to 1:00 PM, managing his household responsibilities including pet care and finances.    CURRENT MEDICATIONS:  She takes atorvastatin, metformin, and uses an inhaler.  "She has nebulizer medication at home.      ROS:  Genitourinary: +urgency, +urinary incontinence       Review of Systems    Objective:      Vitals:    04/28/25 0747   BP: 102/72   BP Location: Left arm   Patient Position: Sitting   Pulse: 60   Resp: 18   Temp: 97 °F (36.1 °C)   TempSrc: Temporal   SpO2: 98%   Weight: 94.3 kg (207 lb 14.3 oz)   Height: 5' 3" (1.6 m)     BP Readings from Last 5 Encounters:   04/28/25 102/72   10/28/24 116/74   05/17/24 117/69   04/26/24 (!) 122/58   10/11/23 120/70     Wt Readings from Last 5 Encounters:   04/28/25 94.3 kg (207 lb 14.3 oz)   10/28/24 94.6 kg (208 lb 8.9 oz)   05/17/24 89.9 kg (198 lb 4.9 oz)   04/26/24 89.5 kg (197 lb 5 oz)   10/11/23 81.1 kg (178 lb 10.9 oz)     Physical Exam  Physical Exam    General: Well-developed. Well-nourished. No acute distress.  Eyes: EOMI. Sclerae anicteric.  HENT: Normocephalic. Atraumatic. Nares patent. Moist oral mucosa.  Cardiovascular: Regular rate. Regular rhythm. No murmurs. No rubs. No gallops. Normal S1, S2.  Respiratory: Normal respiratory effort. Clear to auscultation bilaterally. No rales. No rhonchi. No wheezing.  Musculoskeletal: No  obvious deformity.  Extremities: No lower extremity edema.  Neurological: Alert & oriented x3. No slurred speech. Normal gait.  Psychiatric: Normal mood. Normal affect. Good insight. Good judgment.  Skin: Warm. Dry. No rash.         Lab Results   Component Value Date    WBC 7.58 04/25/2025    HGB 13.9 04/25/2025    HCT 44.1 04/25/2025     04/25/2025    CHOL 149 04/25/2025    TRIG 95 04/25/2025    HDL 54 04/25/2025    ALT 19 04/25/2025    AST 24 04/25/2025     04/25/2025    K 4.2 04/25/2025     04/25/2025    CREATININE 1.1 04/25/2025    BUN 18 04/25/2025    CO2 25 04/25/2025    TSH 1.75 02/06/2019    HGBA1C 6.2 (H) 04/25/2025      This note was generated with the assistance of ambient listening technology. Verbal consent was obtained by the patient and accompanying visitor(s) for " the recording of patient appointment to facilitate this note. I attest to having reviewed and edited the generated note for accuracy, though some syntax or spelling errors may persist. Please contact the author of this note for any clarification.

## 2025-05-10 DIAGNOSIS — J45.20 MILD INTERMITTENT ASTHMA WITHOUT COMPLICATION: ICD-10-CM

## 2025-05-10 NOTE — TELEPHONE ENCOUNTER
No care due was identified.  [facility] Embedded Care Due Messages. Reference number: [id number].   5/10/2025 3:45:38 PM CDT

## 2025-05-11 RX ORDER — ALBUTEROL SULFATE 90 UG/1
2 INHALANT RESPIRATORY (INHALATION) EVERY 4 HOURS PRN
Qty: 54 G | Refills: 3 | Status: SHIPPED | OUTPATIENT
Start: 2025-05-11

## 2025-05-11 RX ORDER — ALBUTEROL SULFATE 90 UG/1
INHALANT RESPIRATORY (INHALATION)
Qty: 54 G | Refills: 3 | OUTPATIENT
Start: 2025-05-11

## 2025-05-11 NOTE — TELEPHONE ENCOUNTER
Refill Decision Note   Rosina Koenig  is requesting a refill authorization.  Brief Assessment and Rationale for Refill:  Approve  Quick Discontinue     Medication Therapy Plan: E-Prescribing Status: Receipt confirmed by pharmacy (4/28/2025  8:21 AM CDT)      Comments:     Note composed:3:54 PM 05/11/2025

## 2025-08-25 ENCOUNTER — PATIENT MESSAGE (OUTPATIENT)
Dept: OPTOMETRY | Facility: CLINIC | Age: 60
End: 2025-08-25
Payer: OTHER GOVERNMENT